# Patient Record
Sex: FEMALE | Race: WHITE | NOT HISPANIC OR LATINO | Employment: UNEMPLOYED | URBAN - METROPOLITAN AREA
[De-identification: names, ages, dates, MRNs, and addresses within clinical notes are randomized per-mention and may not be internally consistent; named-entity substitution may affect disease eponyms.]

---

## 2017-01-01 PROBLEM — L03.115 CELLULITIS OF LEG, RIGHT: Status: ACTIVE | Noted: 2017-01-01

## 2017-01-02 PROBLEM — I50.31 ACUTE DIASTOLIC CONGESTIVE HEART FAILURE: Status: ACTIVE | Noted: 2017-01-02

## 2017-01-02 PROBLEM — E87.6 HYPOKALEMIA: Status: ACTIVE | Noted: 2017-01-02

## 2017-01-06 ENCOUNTER — PATIENT OUTREACH (OUTPATIENT)
Dept: ADMINISTRATIVE | Facility: CLINIC | Age: 82
End: 2017-01-06
Payer: MEDICARE

## 2017-01-06 RX ORDER — ACETAMINOPHEN 325 MG/1
325 TABLET ORAL DAILY
COMMUNITY

## 2017-01-06 NOTE — PROGRESS NOTES
C3 nurse contacted [facility] regarding HH aid and pt is not approved by [facility] for an aid; Authorization may take a week or more to be approved. I requested she call pt to give the son information; HH agreed to call pt.

## 2017-01-06 NOTE — PATIENT INSTRUCTIONS
"Discharge Instructions for Heart Failure  You have been diagnosed with heart failure. The term "heart failure" sounds scary as it suggests the heart has stopped working. But it actually means the heart is not doing its job as well as it should. Heart failure happens when your heart muscle cannot keep up with your body's need for blood flow. Symptoms of heart failure can be controlled by changes in your lifestyle and by following your doctor's advice.   Home Care  Activity   Ask your doctor about an exercise program. You can benefit from simple activities such as walking or gardening. Exercising most days of the week can make you feel better. Don't be discouraged if your progress is slow at first. Rest as needed and stop activity if you develop symptoms such as chest pain, lightheadedness, or significant shortness of breath. Your doctor may prescribe a cardiac rehabilitation program. This is a program to help recover from heart disease through professional lifestyle counseling and education and medically supervised physical activity.   Diet   Follow a heart healthy diet and work hard to remove salt from your diet. Try to limit total salt intake to 2000 mg a day or less. Salt causes your body to retain water, which can make it harder for your heart to pump. You can limit salt by doing the following:  Limit canned, dried, packaged, and fast foods.  Don't add salt to your food at the table.  Season foods with herbs instead of salt when you cook.  Monitor your fluid intake. Drinking too much fluid can make heart failure worse. It is commonly advised to limit total fluid intake to less than 66 ounces (2 liters) a day.  Limit alcohol. Too much alcohol can be harmful to the heart. Alcohol should be limited to no more than one serving a day for women and two servings a day for men.  Tobacco   Break the smoking habit. Smoking increases your chance of having a heart attack, which will worsen heart failure. Quitting smoking is " the number one thing you can do to improve your health. Enroll in a stop smoking program and ask your doctor about medications or nicotine replacement therapy. These methods improve your chances of success.  Medications   Take your medications exactly as prescribed. Learn the names and purpose of each of your medications. Keep an accurate medication list with you at all times including current dosages. Don't skip doses. If you miss a dose of your medication, take it as soon as you remember, unless it's almost time for your next dose. In that case, just wait and take your next dose at the normal time. Don't take a double dose. If you are unsure, contact your doctor or pharmacist.  Weight monitoring   Weigh yourself every day. Do this at the same time of day and in the same kind of clothes. Ideally, weigh yourself first thing every morning after you empty your bladder, but before you eat breakfast. Record your weight and take a record of it to each of your doctor's visit. If your weight increases by 3 pounds in one day or 5 pounds in one week, you should contact your doctor. This is a sign that you are retaining more fluid than you should be, which can worsen heart failure.  Symptoms   Heart failure can cause a variety of symptoms including the following:  Shortness of breath  Difficulty breathing at night  Swelling in the legs and feet or in the abdomen  Becoming easily fatigued  Irregular or rapid heartbeat  Weakness or lightheadedness  It is important to know what to do if you develop signs of worsening heart failure.  Follow-Up  Make a follow-up appointment as directed by our staff. They will provide specific instruction for timing of appointments. Depending on the type and severity of heart failure you have, you may require follow up as early as 7 days from hospital discharge. Keep appointments for checkups and lab tests that are needed to check your medications and condition.  .    When to Call Your Doctor  Call  your doctor right away if you have any of the following signs of worsening heart failure:  Sudden weight gain (3 or more pounds in one day or 5 or more pounds in one week)  Trouble breathing not related to being active  New or increased swelling of your legs or ankles  Swelling or pain in your abdomen  Breathing trouble at night (waking up short of breath, needing more pillows to breathe)  Frequent coughing that doesnt go away  Feeling much more tired than usual  When to Seek Emergency Medical Attention   Call 911 right away if you develop:  Severe shortness of breath, such that you cannot catch your breath, even resting  Severe chest pain that does not resolve with rest or nitroglycerin  Pink, foamy mucus with cough and shortness of breath  A continuous rapid or irregular heartbeat  Passing out or fainting  Acute stroke symptoms such as sudden numbness or weakness on one side of your face, arm, or leg, or sudden confusion, trouble speaking or vision changes.   © 7482-8278 Leda Callaway, [address] [place] [address], [place] [address]. All rights reserved. This information is not intended as a substitute for professional medical care. Always follow your healthcare professional's instructions.

## 2017-07-13 ENCOUNTER — HOSPITAL ENCOUNTER (OUTPATIENT)
Facility: HOSPITAL | Age: 82
LOS: 1 days | Discharge: HOME HEALTH | End: 2017-07-14
Attending: EMERGENCY MEDICINE | Admitting: EMERGENCY MEDICINE
Payer: MEDICARE

## 2017-07-13 DIAGNOSIS — E87.1 HYPONATREMIA: ICD-10-CM

## 2017-07-13 DIAGNOSIS — R53.83 FATIGUE: ICD-10-CM

## 2017-07-13 DIAGNOSIS — I21.4 NSTEMI (NON-ST ELEVATED MYOCARDIAL INFARCTION): Primary | ICD-10-CM

## 2017-07-13 DIAGNOSIS — R53.1 WEAKNESS: ICD-10-CM

## 2017-07-13 DIAGNOSIS — R79.89 ELEVATED TROPONIN: ICD-10-CM

## 2017-07-13 DIAGNOSIS — I50.30 (HFPEF) HEART FAILURE WITH PRESERVED EJECTION FRACTION: ICD-10-CM

## 2017-07-13 PROBLEM — R33.9 URINARY RETENTION: Status: ACTIVE | Noted: 2017-07-13

## 2017-07-13 PROBLEM — I50.32 CHRONIC DIASTOLIC CHF (CONGESTIVE HEART FAILURE): Status: ACTIVE | Noted: 2017-07-13

## 2017-07-13 LAB
ALBUMIN SERPL BCP-MCNC: 3.2 G/DL
ALP SERPL-CCNC: 53 U/L
ALT SERPL W/O P-5'-P-CCNC: 34 U/L
AMORPH CRY URNS QL MICRO: NORMAL
ANION GAP SERPL CALC-SCNC: 10 MMOL/L
AST SERPL-CCNC: 51 U/L
BACTERIA #/AREA URNS HPF: NORMAL /HPF
BASOPHILS # BLD AUTO: 0.01 K/UL
BASOPHILS NFR BLD: 0.1 %
BILIRUB SERPL-MCNC: 2.4 MG/DL
BILIRUB UR QL STRIP: NEGATIVE
BUN SERPL-MCNC: 21 MG/DL
CALCIUM SERPL-MCNC: 9 MG/DL
CHLORIDE SERPL-SCNC: 96 MMOL/L
CLARITY UR: CLEAR
CO2 SERPL-SCNC: 22 MMOL/L
COLOR UR: ABNORMAL
CREAT SERPL-MCNC: 0.9 MG/DL
DIFFERENTIAL METHOD: ABNORMAL
EOSINOPHIL # BLD AUTO: 0 K/UL
EOSINOPHIL NFR BLD: 0 %
ERYTHROCYTE [DISTWIDTH] IN BLOOD BY AUTOMATED COUNT: 13.1 %
EST. GFR  (AFRICAN AMERICAN): >60 ML/MIN/1.73 M^2
EST. GFR  (NON AFRICAN AMERICAN): 56 ML/MIN/1.73 M^2
GLUCOSE SERPL-MCNC: 105 MG/DL
GLUCOSE UR QL STRIP: NEGATIVE
HCT VFR BLD AUTO: 43.5 %
HGB BLD-MCNC: 15.3 G/DL
HGB UR QL STRIP: NEGATIVE
HYALINE CASTS #/AREA URNS LPF: 1 /LPF
KETONES UR QL STRIP: NEGATIVE
LEUKOCYTE ESTERASE UR QL STRIP: NEGATIVE
LIPASE SERPL-CCNC: 44 U/L
LYMPHOCYTES # BLD AUTO: 1.6 K/UL
LYMPHOCYTES NFR BLD: 16.1 %
MCH RBC QN AUTO: 33.9 PG
MCHC RBC AUTO-ENTMCNC: 35.2 %
MCV RBC AUTO: 97 FL
MICROSCOPIC COMMENT: NORMAL
MONOCYTES # BLD AUTO: 1.6 K/UL
MONOCYTES NFR BLD: 15.6 %
NEUTROPHILS # BLD AUTO: 6.9 K/UL
NEUTROPHILS NFR BLD: 68.2 %
NITRITE UR QL STRIP: NEGATIVE
PH UR STRIP: 5 [PH] (ref 5–8)
PLATELET # BLD AUTO: 156 K/UL
PMV BLD AUTO: 9.6 FL
POTASSIUM SERPL-SCNC: 5 MMOL/L
PROT SERPL-MCNC: 7.7 G/DL
PROT UR QL STRIP: ABNORMAL
RBC # BLD AUTO: 4.51 M/UL
RBC #/AREA URNS HPF: 2 /HPF (ref 0–4)
SODIUM SERPL-SCNC: 128 MMOL/L
SP GR UR STRIP: 1.01 (ref 1–1.03)
TROPONIN I SERPL DL<=0.01 NG/ML-MCNC: 0.23 NG/ML
TROPONIN I SERPL DL<=0.01 NG/ML-MCNC: 0.23 NG/ML
URN SPEC COLLECT METH UR: ABNORMAL
UROBILINOGEN UR STRIP-ACNC: NEGATIVE EU/DL
WBC # BLD AUTO: 10.06 K/UL
WBC #/AREA URNS HPF: 3 /HPF (ref 0–5)

## 2017-07-13 PROCEDURE — 51701 INSERT BLADDER CATHETER: CPT

## 2017-07-13 PROCEDURE — 25000003 PHARM REV CODE 250: Performed by: EMERGENCY MEDICINE

## 2017-07-13 PROCEDURE — 93306 TTE W/DOPPLER COMPLETE: CPT

## 2017-07-13 PROCEDURE — G0378 HOSPITAL OBSERVATION PER HR: HCPCS

## 2017-07-13 PROCEDURE — 93005 ELECTROCARDIOGRAM TRACING: CPT | Mod: 59

## 2017-07-13 PROCEDURE — 96374 THER/PROPH/DIAG INJ IV PUSH: CPT

## 2017-07-13 PROCEDURE — 80053 COMPREHEN METABOLIC PANEL: CPT

## 2017-07-13 PROCEDURE — 83690 ASSAY OF LIPASE: CPT

## 2017-07-13 PROCEDURE — 84484 ASSAY OF TROPONIN QUANT: CPT

## 2017-07-13 PROCEDURE — 63600175 PHARM REV CODE 636 W HCPCS: Performed by: EMERGENCY MEDICINE

## 2017-07-13 PROCEDURE — 87086 URINE CULTURE/COLONY COUNT: CPT

## 2017-07-13 PROCEDURE — 85025 COMPLETE CBC W/AUTO DIFF WBC: CPT

## 2017-07-13 PROCEDURE — 81000 URINALYSIS NONAUTO W/SCOPE: CPT

## 2017-07-13 PROCEDURE — 96361 HYDRATE IV INFUSION ADD-ON: CPT

## 2017-07-13 PROCEDURE — 99291 CRITICAL CARE FIRST HOUR: CPT | Mod: 25

## 2017-07-13 PROCEDURE — 36415 COLL VENOUS BLD VENIPUNCTURE: CPT

## 2017-07-13 PROCEDURE — 51798 US URINE CAPACITY MEASURE: CPT

## 2017-07-13 PROCEDURE — 96372 THER/PROPH/DIAG INJ SC/IM: CPT

## 2017-07-13 PROCEDURE — 21400001 HC TELEMETRY ROOM

## 2017-07-13 RX ORDER — ROSUVASTATIN CALCIUM 10 MG/1
40 TABLET, COATED ORAL DAILY
Status: DISCONTINUED | OUTPATIENT
Start: 2017-07-13 | End: 2017-07-14 | Stop reason: HOSPADM

## 2017-07-13 RX ORDER — ONDANSETRON 2 MG/ML
4 INJECTION INTRAMUSCULAR; INTRAVENOUS EVERY 8 HOURS PRN
Status: DISCONTINUED | OUTPATIENT
Start: 2017-07-13 | End: 2017-07-14 | Stop reason: HOSPADM

## 2017-07-13 RX ORDER — ENOXAPARIN SODIUM 100 MG/ML
1 INJECTION SUBCUTANEOUS
Status: COMPLETED | OUTPATIENT
Start: 2017-07-13 | End: 2017-07-13

## 2017-07-13 RX ORDER — SODIUM CHLORIDE 0.9 % (FLUSH) 0.9 %
3 SYRINGE (ML) INJECTION EVERY 8 HOURS PRN
Status: DISCONTINUED | OUTPATIENT
Start: 2017-07-13 | End: 2017-07-14 | Stop reason: HOSPADM

## 2017-07-13 RX ORDER — METOPROLOL TARTRATE 1 MG/ML
5 INJECTION, SOLUTION INTRAVENOUS
Status: COMPLETED | OUTPATIENT
Start: 2017-07-13 | End: 2017-07-13

## 2017-07-13 RX ORDER — DABIGATRAN ETEXILATE 75 MG/1
75 CAPSULE ORAL 2 TIMES DAILY
Status: DISCONTINUED | OUTPATIENT
Start: 2017-07-13 | End: 2017-07-14 | Stop reason: HOSPADM

## 2017-07-13 RX ORDER — LISINOPRIL 5 MG/1
10 TABLET ORAL DAILY
Status: DISCONTINUED | OUTPATIENT
Start: 2017-07-13 | End: 2017-07-14 | Stop reason: HOSPADM

## 2017-07-13 RX ORDER — METOPROLOL TARTRATE 50 MG/1
50 TABLET ORAL 2 TIMES DAILY
Status: DISCONTINUED | OUTPATIENT
Start: 2017-07-13 | End: 2017-07-14 | Stop reason: HOSPADM

## 2017-07-13 RX ORDER — ENOXAPARIN SODIUM 100 MG/ML
1 INJECTION SUBCUTANEOUS
Status: DISCONTINUED | OUTPATIENT
Start: 2017-07-14 | End: 2017-07-13

## 2017-07-13 RX ADMIN — SODIUM CHLORIDE 500 ML: 0.9 INJECTION, SOLUTION INTRAVENOUS at 10:07

## 2017-07-13 RX ADMIN — LISINOPRIL 10 MG: 5 TABLET ORAL at 05:07

## 2017-07-13 RX ADMIN — DABIGATRAN ETEXILATE MESYLATE 75 MG: 75 CAPSULE ORAL at 09:07

## 2017-07-13 RX ADMIN — ROSUVASTATIN CALCIUM 40 MG: 10 TABLET ORAL at 05:07

## 2017-07-13 RX ADMIN — METOPROLOL TARTRATE 5 MG: 5 INJECTION INTRAVENOUS at 10:07

## 2017-07-13 RX ADMIN — METOPROLOL TARTRATE 50 MG: 50 TABLET ORAL at 09:07

## 2017-07-13 RX ADMIN — ENOXAPARIN SODIUM 60 MG: 100 INJECTION SUBCUTANEOUS at 10:07

## 2017-07-13 NOTE — ASSESSMENT & PLAN NOTE
She denies chest pain,EKG show Afib with non specific T wave changes will monitor with serial cardiac marks,check Echo.

## 2017-07-13 NOTE — PROGRESS NOTES
Awake and alert no sob no pains. Skin intact no edema no . Oriented by 3 call light in reach assessment completed.

## 2017-07-13 NOTE — SUBJECTIVE & OBJECTIVE
"Past Medical History:   Diagnosis Date    Coronary artery disease     Depression     ON WELLBUTRIN    Hypertension        History reviewed. No pertinent surgical history.    Review of patient's allergies indicates:   Allergen Reactions    Aspirin     Xanax [alprazolam]        No current facility-administered medications on file prior to encounter.      Current Outpatient Prescriptions on File Prior to Encounter   Medication Sig    alendronate (FOSAMAX) 10 MG Tab Take 5 mg by mouth once daily.    busPIRone (BUSPAR) 10 MG tablet Take 10 mg by mouth 2 (two) times daily.     dabigatran etexilate (PRADAXA) 75 mg Cap Take 75 mg by mouth 2 (two) times daily. "Do NOT break, chew, or open capsules."    furosemide (LASIX) 20 MG tablet Take 1 tablet (20 mg total) by mouth 2 (two) times daily.    lisinopril 10 MG tablet Take 1 tablet (10 mg total) by mouth once daily.    metoprolol tartrate (LOPRESSOR) 50 MG tablet Take 50 mg by mouth 2 (two) times daily.    mirtazapine (REMERON) 15 MG tablet Take 15 mg by mouth every evening.    MV, MIN #36/IRON,CARBONYL/FA (GERITOL COMPLETE ORAL) Take by mouth once daily.     spironolactone (ALDACTONE) 25 MG tablet Take 0.5 tablets (12.5 mg total) by mouth once daily.    acetaminophen (TYLENOL) 325 MG tablet Take 325 mg by mouth Daily.    [DISCONTINUED] pantoprazole (PROTONIX) 40 MG tablet Take 40 mg by mouth once daily.    [DISCONTINUED] ranitidine (ZANTAC) 300 MG tablet Take 300 mg by mouth every evening.     Family History     None        Social History Main Topics    Smoking status: Former Smoker    Smokeless tobacco: Not on file    Alcohol use No    Drug use: No    Sexual activity: Not Currently     Review of Systems   Constitutional: Negative for activity change and appetite change.   HENT: Negative for congestion and dental problem.    Eyes: Negative for discharge and itching.   Respiratory: Negative for apnea and chest tightness.    Cardiovascular: Negative for " chest pain and leg swelling.   Gastrointestinal: Negative for abdominal distention and abdominal pain.   Endocrine: Negative for cold intolerance and heat intolerance.   Genitourinary: Positive for decreased urine volume and difficulty urinating. Negative for dyspareunia.   Musculoskeletal: Negative for arthralgias.   Skin: Negative for color change and pallor.   Allergic/Immunologic: Negative for environmental allergies.   Neurological: Negative for dizziness and facial asymmetry.   Hematological: Negative for adenopathy.   Psychiatric/Behavioral: Negative for agitation and behavioral problems.     Objective:     Vital Signs (Most Recent):  Temp: 97.6 °F (36.4 °C) (07/13/17 1309)  Pulse: 106 (07/13/17 1309)  Resp: 18 (07/13/17 1309)  BP: 123/61 (07/13/17 1309)  SpO2: 96 % (07/13/17 1309) Vital Signs (24h Range):  Temp:  [97.6 °F (36.4 °C)-98.2 °F (36.8 °C)] 97.6 °F (36.4 °C)  Pulse:  [100-110] 106  Resp:  [16-20] 18  SpO2:  [89 %-96 %] 96 %  BP: (107-136)/(60-67) 123/61     Weight: 59.9 kg (132 lb)  Body mass index is 25.78 kg/m².    Physical Exam   Constitutional: She is oriented to person, place, and time. No distress.   HENT:   Head: Atraumatic.   Eyes: EOM are normal.   Neck: Normal range of motion. Neck supple.   Cardiovascular: Normal rate.    irreguar   Pulmonary/Chest: Effort normal and breath sounds normal.   Abdominal: Soft. Bowel sounds are normal.   Musculoskeletal: Normal range of motion.   Neurological: She is oriented to person, place, and time. No cranial nerve deficit. Coordination normal.   Skin: Skin is warm and dry. She is not diaphoretic.   Psychiatric: She has a normal mood and affect. Her behavior is normal.        Significant Labs:   BMP:   Recent Labs  Lab 07/13/17 0905      *   K 5.0   CL 96   CO2 22*   BUN 21   CREATININE 0.9   CALCIUM 9.0     CBC:   Recent Labs  Lab 07/13/17 0905   WBC 10.06   HGB 15.3   HCT 43.5        Troponin:   Recent Labs  Lab 07/13/17 0905    TROPONINI 0.225*       Significant Imaging: reviewed

## 2017-07-13 NOTE — H&P
"[facility] Hospital Medicine  History & Physical    Patient Name: Rachael Almaguer  MRN: [id number]  Admission Date: 7/13/2017  Attending Physician: Lisa Ramos MD   Primary Care Provider: Omi Thomas MD         Patient information was obtained from patient and ER records.     Subjective:     Principal Problem:Elevated troponin    Chief Complaint:   Chief Complaint   Patient presents with    Fatigue     since last pm been weak with complaints of unable to urinate since last pm.. denies pains.        HPI: This 89 y.o. Female with HTN, CAD and depression presents to the ED secondary to fatigue. Symptoms began this morning. There's associated generalized weakness. She also reports lower bilateral abdominal pain with onset last night along with urinary retention. She reports drinking 3 bottles of water without producing a urine stream. Patient has a hx of urinary retention. There's no attempted treatment for abdominal pain. No UTI hx. She denies dysuria at this time.patient has slight elevated Troponin with no chest pain.she denies abdominal pain at this time.    Past Medical History:   Diagnosis Date    Coronary artery disease     Depression     ON WELLBUTRIN    Hypertension        History reviewed. No pertinent surgical history.    Review of patient's allergies indicates:   Allergen Reactions    Aspirin     Xanax [alprazolam]        No current facility-administered medications on file prior to encounter.      Current Outpatient Prescriptions on File Prior to Encounter   Medication Sig    alendronate (FOSAMAX) 10 MG Tab Take 5 mg by mouth once daily.    busPIRone (BUSPAR) 10 MG tablet Take 10 mg by mouth 2 (two) times daily.     dabigatran etexilate (PRADAXA) 75 mg Cap Take 75 mg by mouth 2 (two) times daily. "Do NOT break, chew, or open capsules."    furosemide (LASIX) 20 MG tablet Take 1 tablet (20 mg total) by mouth 2 (two) times daily.    lisinopril 10 MG tablet Take 1 " tablet (10 mg total) by mouth once daily.    metoprolol tartrate (LOPRESSOR) 50 MG tablet Take 50 mg by mouth 2 (two) times daily.    mirtazapine (REMERON) 15 MG tablet Take 15 mg by mouth every evening.    MV, MIN #36/IRON,CARBONYL/FA (GERITOL COMPLETE ORAL) Take by mouth once daily.     spironolactone (ALDACTONE) 25 MG tablet Take 0.5 tablets (12.5 mg total) by mouth once daily.    acetaminophen (TYLENOL) 325 MG tablet Take 325 mg by mouth Daily.    [DISCONTINUED] pantoprazole (PROTONIX) 40 MG tablet Take 40 mg by mouth once daily.    [DISCONTINUED] ranitidine (ZANTAC) 300 MG tablet Take 300 mg by mouth every evening.     Family History     None        Social History Main Topics    Smoking status: Former Smoker    Smokeless tobacco: Not on file    Alcohol use No    Drug use: No    Sexual activity: Not Currently     Review of Systems   Constitutional: Negative for activity change and appetite change.   HENT: Negative for congestion and dental problem.    Eyes: Negative for discharge and itching.   Respiratory: Negative for apnea and chest tightness.    Cardiovascular: Negative for chest pain and leg swelling.   Gastrointestinal: Negative for abdominal distention and abdominal pain.   Endocrine: Negative for cold intolerance and heat intolerance.   Genitourinary: Positive for decreased urine volume and difficulty urinating. Negative for dyspareunia.   Musculoskeletal: Negative for arthralgias.   Skin: Negative for color change and pallor.   Allergic/Immunologic: Negative for environmental allergies.   Neurological: Negative for dizziness and facial asymmetry.   Hematological: Negative for adenopathy.   Psychiatric/Behavioral: Negative for agitation and behavioral problems.     Objective:     Vital Signs (Most Recent):  Temp: 97.6 °F (36.4 °C) (07/13/17 1309)  Pulse: 106 (07/13/17 1309)  Resp: 18 (07/13/17 1309)  BP: 123/61 (07/13/17 1309)  SpO2: 96 % (07/13/17 1309) Vital Signs (24h Range):  Temp:  [97.6  °F (36.4 °C)-98.2 °F (36.8 °C)] 97.6 °F (36.4 °C)  Pulse:  [100-110] 106  Resp:  [16-20] 18  SpO2:  [89 %-96 %] 96 %  BP: (107-136)/(60-67) 123/61     Weight: 59.9 kg (132 lb)  Body mass index is 25.78 kg/m².    Physical Exam   Constitutional: She is oriented to person, place, and time. No distress.   HENT:   Head: Atraumatic.   Eyes: EOM are normal.   Neck: Normal range of motion. Neck supple.   Cardiovascular: Normal rate.    irreguar   Pulmonary/Chest: Effort normal and breath sounds normal.   Abdominal: Soft. Bowel sounds are normal.   Musculoskeletal: Normal range of motion.   Neurological: She is oriented to person, place, and time. No cranial nerve deficit. Coordination normal.   Skin: Skin is warm and dry. She is not diaphoretic.   Psychiatric: She has a normal mood and affect. Her behavior is normal.        Significant Labs:   BMP:   Recent Labs  Lab 07/13/17  0905      *   K 5.0   CL 96   CO2 22*   BUN 21   CREATININE 0.9   CALCIUM 9.0     CBC:   Recent Labs  Lab 07/13/17  0905   WBC 10.06   HGB 15.3   HCT 43.5        Troponin:   Recent Labs  Lab 07/13/17 0905   TROPONINI 0.225*       Significant Imaging: reviewed    Assessment/Plan:     * Elevated troponin    She denies chest pain,EKG show Afib with non specific T wave changes will monitor with serial cardiac marks,check Echo.          Urinary retention    Monitor with bladder scan.          Chronic diastolic CHF (congestive heart failure)    No sign of fluid overload,on ACE and BB.          Chronic atrial fibrillation    Rate controlled,on BB an d OAC          Hyponatremia    likely duo to diuresis,will monitor.            VTE Risk Mitigation         Ordered     dabigatran etexilate capsule 75 mg  2 times daily     Route:  Oral        07/13/17 1305     Medium Risk of VTE  Once      07/13/17 1305     Place sequential compression device  Until discontinued      07/13/17 1305     Place MARYANNE hose  Until discontinued      07/13/17 1305         Lisa Ramos MD  Department of Hospital Medicine   [facility]

## 2017-07-13 NOTE — HPI
This 89 y.o. Female with HTN, CAD and depression presents to the ED secondary to fatigue. Symptoms began this morning. There's associated generalized weakness. She also reports lower bilateral abdominal pain with onset last night along with urinary retention. She reports drinking 3 bottles of water without producing a urine stream. Patient has a hx of urinary retention. There's no attempted treatment for abdominal pain. No UTI hx. She denies dysuria at this time.patient has slight elevated Troponin with no chest pain.she denies abdominal pain at this time.

## 2017-07-13 NOTE — ED PROVIDER NOTES
Encounter Date: 7/13/2017    SCRIBE #1 NOTE: I, Darci Charles, am scribing for, and in the presence of,  Earl Barbosa MD. I have scribed the following portions of the note - Other sections scribed: HPI and ROS.       History     Chief Complaint   Patient presents with    Fatigue     since last pm been weak with complaints of unable to urinate since last pm.. denies pains.     Chief Complaint: Fatigue    HPI: This 89 y.o. Female with HTN, CAD and depression presents to the ED secondary to fatigue. Symptoms began this morning. There's associated generalized weakness. She also reports lower bilateral abdominal pain with onset last night along with urinary retention. She reports drinking 3 bottles of water without producing a urine stream. Patient has a hx of urinary retention. There's no attempted treatment for abdominal pain. No UTI hx. She denies dysuria at this time.       The history is provided by the patient and a relative. No  was used.     Review of patient's allergies indicates:   Allergen Reactions    Aspirin      Past Medical History:   Diagnosis Date    Coronary artery disease     Depression     ON WELLBUTRIN    Hypertension      History reviewed. No pertinent surgical history.  History reviewed. No pertinent family history.  Social History   Substance Use Topics    Smoking status: Former Smoker    Smokeless tobacco: Not on file    Alcohol use No     Review of Systems   Constitutional: Positive for fatigue. Negative for chills and fever.   HENT: Negative for congestion, ear pain, rhinorrhea and sore throat.    Eyes: Negative for pain and visual disturbance.   Respiratory: Negative for cough and shortness of breath.    Cardiovascular: Negative for chest pain.   Gastrointestinal: Positive for abdominal pain. Negative for diarrhea, nausea and vomiting.   Genitourinary: Positive for decreased urine volume and difficulty urinating. Negative for dysuria.   Musculoskeletal:  Negative for back pain.        (-) arm or leg problems   Skin: Negative for rash.   Neurological: Positive for weakness. Negative for headaches.   All other systems reviewed and are negative.      Physical Exam     Initial Vitals [07/13/17 0727]   BP Pulse Resp Temp SpO2   136/64 110 18 97.9 °F (36.6 °C) 95 %      MAP       88         Physical Exam  Nursing note and vitals reviewed.  Constitutional:  Uncomfortable nontoxic appearing elderly female in no obvious distress.  HENT:    Head: NC/AT    Eyes: Conjunctivae normal.  (-) scleral icterus.              Mouth/Throat: MMM   Neck: Neck supple, normal rom.  Cardiovascular: Tachycardic, irregular rhythm  Pulmonary/Chest: CTAB   Abdomen:  Soft, ND/suprapubic ttp.  (-) CVA tenderness.  Musculoskeletal:  FROM of all major joints. No apparent edema or tenderness.  Neurological: A&O x3.  No acute focal motor deficits.    Skin: Skin is intact, warm and dry.   Psychiatric: normal mood and affect.      ED Course   Critical Care  Date/Time: 7/13/2017 11:47 AM  Performed by: CHADWICK RICE.  Authorized by: CHADWICK RICE.   Direct patient critical care time: 10 minutes  Additional history critical care time: 10 minutes  Ordering / reviewing critical care time: 10 minutes  Documentation critical care time: 5 minutes  Consulting other physicians critical care time: 5 minutes  Total critical care time (exclusive of procedural time) : 40 minutes  Critical care time was exclusive of separately billable procedures and treating other patients and teaching time.  Critical care was necessary to treat or prevent imminent or life-threatening deterioration of the following conditions: cardiac failure.  Critical care was time spent personally by me on the following activities: development of treatment plan with patient or surrogate, evaluation of patient's response to treatment, examination of patient, obtaining history from patient or surrogate, ordering and performing treatments  and interventions, ordering and review of laboratory studies, ordering and review of radiographic studies, pulse oximetry, re-evaluation of patient's condition and review of old charts.        Labs Reviewed   CBC W/ AUTO DIFFERENTIAL - Abnormal; Notable for the following:        Result Value    MCH 33.9 (*)     Mono # 1.6 (*)     Lymph% 16.1 (*)     Mono% 15.6 (*)     All other components within normal limits   COMPREHENSIVE METABOLIC PANEL - Abnormal; Notable for the following:     Sodium 128 (*)     CO2 22 (*)     Albumin 3.2 (*)     Total Bilirubin 2.4 (*)     Alkaline Phosphatase 53 (*)     AST 51 (*)     eGFR if non  56 (*)     All other components within normal limits   TROPONIN I - Abnormal; Notable for the following:     Troponin I 0.225 (*)     All other components within normal limits   URINALYSIS - Abnormal; Notable for the following:     Protein, UA 1+ (*)     All other components within normal limits   CULTURE, URINE   LIPASE   URINALYSIS MICROSCOPIC     EKG Readings: (Independently Interpreted)   Initial Reading: No STEMI.   A. fib with RVR, rate 113, nonspecific ST/T-wave abnormality.          Medical Decision Making:   History:   I obtained history from: someone other than patient.  Old Medical Records: I decided to obtain old medical records.  Old Records Summarized: records from clinic visits and other records.  Independently Interpreted Test(s):   I have ordered and independently interpreted X-rays - see prior notes.  I have ordered and independently interpreted EKG Reading(s) - see prior notes  Clinical Tests:   Lab Tests: Ordered and Reviewed  Radiological Study: Ordered and Reviewed  Medical Tests: Ordered and Reviewed    Differential Diagnosis:   Initial differential diagnosis includes but is not limited to...appendicitis, nephrolithiasis, pyelonephritis, cystitis, food borne vs viral gastroenteritis, colitis, ileus, sbo, mesenteric ischemia.     Additional Medical Decision  Making:   Emergent evaluation of a 89-year-old female with history of hypertension, CAD, and CHF who presents the emergency department complaining of lower pain with urinary hesitancy since late last night.  On exam, she has suprapubic tenderness to deep palpation without guarding or rebound.  She has no CVAT to suggest pyonephritis.  VS reassuring.  Afebrile.  Basic labs along with UA and postvoid residual pending. - Basic labs within normal limits.  Repeat EKG again without evidence of acute ischemia.  Her troponin however was found to be elevated.   Lovenox SC given for suspected NSTEMI and she has been admitted to medicine for further evaluation and management.  Cardiology to be consulted inpatient.          Scribe Attestation:   Scribe #1: I performed the above scribed service and the documentation accurately describes the services I performed. I attest to the accuracy of the note.    Attending Attestation:           Physician Attestation for Scribe:  Physician Attestation Statement for Scribe #1: I, Earl Barbosa MD, reviewed documentation, as scribed by Darci Charles in my presence, and it is both accurate and complete.                 ED Course     Clinical Impression:   The primary encounter diagnosis was NSTEMI (non-ST elevated myocardial infarction). Diagnoses of Fatigue, Weakness, Hyponatremia, and (HFpEF) heart failure with preserved ejection fraction were also pertinent to this visit.                           Earl Barbosa MD  07/13/17 [id number]

## 2017-07-14 VITALS
WEIGHT: 136 LBS | OXYGEN SATURATION: 94 % | SYSTOLIC BLOOD PRESSURE: 127 MMHG | HEART RATE: 101 BPM | DIASTOLIC BLOOD PRESSURE: 59 MMHG | BODY MASS INDEX: 26.7 KG/M2 | TEMPERATURE: 97 F | HEIGHT: 60 IN | RESPIRATION RATE: 18 BRPM

## 2017-07-14 LAB
ALBUMIN SERPL BCP-MCNC: 2.9 G/DL
ALP SERPL-CCNC: 52 U/L
ALT SERPL W/O P-5'-P-CCNC: 33 U/L
ANION GAP SERPL CALC-SCNC: 9 MMOL/L
AORTIC VALVE REGURGITATION: ABNORMAL
AST SERPL-CCNC: 52 U/L
BASOPHILS # BLD AUTO: 0.02 K/UL
BASOPHILS NFR BLD: 0.2 %
BILIRUB SERPL-MCNC: 2.1 MG/DL
BUN SERPL-MCNC: 24 MG/DL
CALCIUM SERPL-MCNC: 8.9 MG/DL
CHLORIDE SERPL-SCNC: 98 MMOL/L
CO2 SERPL-SCNC: 23 MMOL/L
CREAT SERPL-MCNC: 1 MG/DL
DIASTOLIC DYSFUNCTION: YES
DIFFERENTIAL METHOD: ABNORMAL
EOSINOPHIL # BLD AUTO: 0 K/UL
EOSINOPHIL NFR BLD: 0.2 %
ERYTHROCYTE [DISTWIDTH] IN BLOOD BY AUTOMATED COUNT: 13.5 %
EST. GFR  (AFRICAN AMERICAN): 57 ML/MIN/1.73 M^2
EST. GFR  (NON AFRICAN AMERICAN): 49 ML/MIN/1.73 M^2
ESTIMATED PA SYSTOLIC PRESSURE: 55.06
GLUCOSE SERPL-MCNC: 74 MG/DL
HCT VFR BLD AUTO: 41 %
HGB BLD-MCNC: 14.1 G/DL
INR PPP: 1.3
LYMPHOCYTES # BLD AUTO: 1.7 K/UL
LYMPHOCYTES NFR BLD: 19.5 %
MAGNESIUM SERPL-MCNC: 1.5 MG/DL
MCH RBC QN AUTO: 33.9 PG
MCHC RBC AUTO-ENTMCNC: 34.4 %
MCV RBC AUTO: 99 FL
MITRAL VALVE MOBILITY: ABNORMAL
MITRAL VALVE REGURGITATION: ABNORMAL
MONOCYTES # BLD AUTO: 1.2 K/UL
MONOCYTES NFR BLD: 14.2 %
NEUTROPHILS # BLD AUTO: 5.6 K/UL
NEUTROPHILS NFR BLD: 65.9 %
PHOSPHATE SERPL-MCNC: 2.9 MG/DL
PLATELET # BLD AUTO: 183 K/UL
PMV BLD AUTO: 9.7 FL
POTASSIUM SERPL-SCNC: 5 MMOL/L
PROT SERPL-MCNC: 6.9 G/DL
PROTHROMBIN TIME: 13.4 SEC
RBC # BLD AUTO: 4.16 M/UL
RETIRED EF AND QEF - SEE NOTES: 65 (ref 55–65)
SODIUM SERPL-SCNC: 130 MMOL/L
TRICUSPID VALVE REGURGITATION: ABNORMAL
TROPONIN I SERPL DL<=0.01 NG/ML-MCNC: 0.2 NG/ML
WBC # BLD AUTO: 8.53 K/UL

## 2017-07-14 PROCEDURE — G8978 MOBILITY CURRENT STATUS: HCPCS | Mod: CI

## 2017-07-14 PROCEDURE — 85610 PROTHROMBIN TIME: CPT

## 2017-07-14 PROCEDURE — 94761 N-INVAS EAR/PLS OXIMETRY MLT: CPT

## 2017-07-14 PROCEDURE — 80053 COMPREHEN METABOLIC PANEL: CPT

## 2017-07-14 PROCEDURE — 97161 PT EVAL LOW COMPLEX 20 MIN: CPT

## 2017-07-14 PROCEDURE — G0378 HOSPITAL OBSERVATION PER HR: HCPCS

## 2017-07-14 PROCEDURE — G8979 MOBILITY GOAL STATUS: HCPCS | Mod: CI

## 2017-07-14 PROCEDURE — 51798 US URINE CAPACITY MEASURE: CPT

## 2017-07-14 PROCEDURE — 83735 ASSAY OF MAGNESIUM: CPT

## 2017-07-14 PROCEDURE — G8980 MOBILITY D/C STATUS: HCPCS | Mod: CI

## 2017-07-14 PROCEDURE — 25000003 PHARM REV CODE 250: Performed by: EMERGENCY MEDICINE

## 2017-07-14 PROCEDURE — 84100 ASSAY OF PHOSPHORUS: CPT

## 2017-07-14 PROCEDURE — 36415 COLL VENOUS BLD VENIPUNCTURE: CPT

## 2017-07-14 PROCEDURE — 85025 COMPLETE CBC W/AUTO DIFF WBC: CPT

## 2017-07-14 RX ADMIN — METOPROLOL TARTRATE 50 MG: 50 TABLET ORAL at 09:07

## 2017-07-14 RX ADMIN — DABIGATRAN ETEXILATE MESYLATE 75 MG: 75 CAPSULE ORAL at 09:07

## 2017-07-14 RX ADMIN — ROSUVASTATIN CALCIUM 40 MG: 10 TABLET ORAL at 09:07

## 2017-07-14 RX ADMIN — LISINOPRIL 10 MG: 5 TABLET ORAL at 09:07

## 2017-07-14 NOTE — PLAN OF CARE
Problem: Physical Therapy Goal  Goal: Physical Therapy Goal  Outcome: Outcome(s) achieved Date Met: 07/14/17    Patient ambulated ~200ft with RW and CGA. She would benefit from HH PT at discharge.

## 2017-07-14 NOTE — PT/OT/SLP PROGRESS
Occupational Therapy      Rachael Almaguer  MRN: [id number]    Orders received, chart reviewed.  Per PT notes patient ambulating at her PLOF, spoke with family in room who stated that patient with no OT needs at this time.  Patient to be discharged home.     SINAN Snow, MS  7/14/2017

## 2017-07-14 NOTE — PROGRESS NOTES
Patient discharged to home today with family. Patient is [place] but she stated to give instructions to daughter and daughter in-law. Both educated on the importance of keeping all appointments and  medication compliance .Both verbalized understanding of all instructions.

## 2017-07-14 NOTE — PT/OT/SLP EVAL
Physical Therapy  Evaluation / Discharge    Rachael Almaguer   MRN: [id number]   Admitting Diagnosis: Elevated troponin    PT Received On: 07/14/17  PT Start Time: 1100     PT Stop Time: 1110    PT Total Time (min): 10 min       Billable Minutes:  Evaluation  10     Diagnosis: Elevated troponin    Past Medical History:   Diagnosis Date    Coronary artery disease     Depression     ON [place]    Hypertension       History reviewed. No pertinent surgical history.    Referring physician: Richard  Date referred to PT: 7/13/17    General Precautions: Standard, fall  Orthopedic Precautions: N/A   Braces: N/A       Patient History:  Lives With: alone  Living Arrangements: house  Home Accessibility: stairs to enter home  Number of Stairs to Enter Home: 1  Living Environment Comment: Patient's family wants her to go move in with them but she is declining.   Equipment Currently Used at Home: rollator    Previous Level of Function:  Ambulation Skills: needs device  Transfer Skills: needs device    Subjective:  Communicated with nurse Pisano prior to session.  Patient agreeable to participate in PT evaluation.   Chief Complaint: Her family wants her to move in with them but she wants to stay in her own home.   Patient goals: To go home.     Pain/Comfort  Pain Rating 1: 0/10    Objective:   Patient found with: telemetry, peripheral IV     Cognitive Exam:  Oriented to: Person, Place and Situation    Follows Commands/attention: Follows one-step commands  Communication: clear/fluent  Safety awareness/insight to disability: impaired    Physical Exam:  Postural examination/scapula alignment: Rounded shoulder, Head forward and Kyphosis    Skin integrity: Visible skin intact  Edema: None noted     Sensation:   Intact    Upper Extremity Range of Motion:  Right Upper Extremity: WFL  Left Upper Extremity: WFL    Upper Extremity Strength:  Right Upper Extremity: WFL  Left Upper Extremity: WFL    Lower Extremity Range of  Motion:  Right Lower Extremity: WFL  Left Lower Extremity: WFL    Lower Extremity Strength:  Right Lower Extremity: WFL  Left Lower Extremity: WFL     Fine motor coordination:Intact    Gross motor coordination: WFL    Functional Mobility:  Bed Mobility:  Supine to Sit: Supervision  Sit to Supine: Supervision    Transfers:  Sit <> Stand Assistance: Supervision  Sit <> Stand Assistive Device: Rolling Walker    Gait:   Gait Distance: ~200ft   Assistance 1: Contact Guard Assistance  Gait Assistive Device: Rolling walker  Gait Pattern: reciprocal    Balance:   Static Sit: GOOD: Takes MODERATE challenges from all directions  Dynamic Sit: GOOD-: Maintains balance through MODERATE excursions of active trunk movement,     Static Stand: FAIR+: Takes MINIMAL challenges from all directions  Dynamic stand: FAIR: Needs CONTACT GUARD during gait    AM-PAC 6 CLICK MOBILITY  How much help from another person does this patient currently need?   1 = Unable, Total/Dependent Assistance  2 = A lot, Maximum/Moderate Assistance  3 = A little, Minimum/Contact Guard/Supervision  4 = None, Modified Camp/Independent    Turning over in bed (including adjusting bedclothes, sheets and blankets)?: 4  Sitting down on and standing up from a chair with arms (e.g., wheelchair, bedside commode, etc.): 4  Moving from lying on back to sitting on the side of the bed?: 4  Moving to and from a bed to a chair (including a wheelchair)?: 4  Need to walk in hospital room?: 4  Climbing 3-5 steps with a railing?: 3  Total Score: 23     AM-PAC Raw Score CMS G-Code Modifier Level of Impairment Assistance   6 % Total / Unable   7 - 9 CM 80 - 100% Maximal Assist   10 - 14 CL 60 - 80% Moderate Assist   15 - 19 CK 40 - 60% Moderate Assist   20 - 22 CJ 20 - 40% Minimal Assist   23 CI 1-20% SBA / CGA   24 CH 0% Independent/ Mod I     Patient left supine with all lines intact, call button in reach, nurse notified and multiple family members present  present. Dr Marcelino notified of PT recommendations.     Assessment:   Rachael Almaguer is a 89 y.o. female with a medical diagnosis of Elevated troponin. She appears to be at PLOF with ambulation. She has no needs for PT while in the hospital. She is okay to ambulate with nursing staff supervision and rolling walker. PT to sign off.     Rehab identified problem list/impairments: Rehab identified problem list/impairments: decreased safety awareness    Rehab potential is good.    Activity tolerance: Good    Discharge recommendations: Discharge Facility/Level Of Care Needs: home health PT     Barriers to discharge: Barriers to Discharge: Decreased caregiver support (patient lives alone and is declining to move in with family members)    Equipment recommendations: Equipment Needed After Discharge: none     GOALS:    Physical Therapy Goals     Not on file          Multidisciplinary Problems (Resolved)        Problem: Physical Therapy Goal    Goal Priority Disciplines Outcome Goal Variances Interventions   Physical Therapy Goal   (Resolved)     PT/OT, PT Outcome(s) achieved                   PLAN:    Plan of Care reviewed with: patient (multiple family members)    Functional Assessment Tool Used: AM PAC   Score: 23  Functional Limitation: Mobility: Walking and moving around  Mobility: Walking and Moving Around Current Status (): CI  Mobility: Walking and Moving Around Goal Status (): CI  Mobility: Walking and Moving Around Discharge Status (): CI     Lily Walton, PT, MOT  07/14/2017

## 2017-07-14 NOTE — PLAN OF CARE
"   07/14/17 1636   Final Note   Assessment Type Final Discharge Note   Discharge Disposition Home-Health   Discharge planning education complete? Yes   What phone number can be called within the next 1-3 days to see how you are doing after discharge? ([phone])   Hospital Follow Up  Appt(s) scheduled? Yes   Discharge plans and expectations educations in teach back method with documentation complete? Yes   [facility]fered [facility] -- Bedside Delivery? n/a   Discharge/Hospital Encounter Summary to (non-[facility]) PCP n/a   Referral to Outpatient Case Management complete? n/a   Referral to / orders for Home Health Complete? n/a   30 day supply of medicines given at discharge, if documented non-compliance / non-adherence? n/a   Any social issues identified prior to discharge? n/a   Did you assess the readiness or willingness of the family or caregiver to support self management of care? Yes   Right Care Referral Info   Referral Type Home [facility]   Facility Name [facility]   Patient / Family provided with educational information on reason for admit and findings.  Information reviewed and placed in :My Healthcare Packet" to be brought home for patient / family  to use as resource after discharge.  Information included:  signs and symptoms to look for and call the doctor if experiencing, and symptoms that may indicate a medical emergency: CALL 911:  SOB unrelieved by sitting and chest pain.    Reminded patient things they will be responsible for to manage healthcare at home: getting Rx filled, attending follow up appointments, and taking medication as prescribed were discussed.   Teach back method used.  All questions answered.  Daughters verbalized understanding of all information.        "

## 2017-07-14 NOTE — PROGRESS NOTES
[facility]    WRITTEN HEALTHCARE AND DISCHARGE INFORMATION     Follow-up Information     Boris Carmichael MD On 7/19/2017.    Specialty:  Internal Medicine  Why:  Outpatient Services, PCP follow-up appointment. Patient should arrive by 9:00AM. Pt will see NP Niranjan Liu.   Contact information:  [address]  [address]  [place] [address]  [phone]             [facility].    Specialty:  Home Health Services  Why:  Home Health  Contact information:  [address]  [address]  [place] [address]  [phone]                                      [facility]           [phone]  After discharge for assistance [facility] On Call Nurse Care Line 24/7 Assistance    Things You are responsible For To Manage Your Care At Home:  1.    Getting your prescriptions filled   2.    Taking your medications as directed, DO NOT MISS ANY DOSES!  3.    Going to your follow-up doctor appointment. This is important because it  allow the doctor to monitor your progress and determine if  any changes need to made to your treatment plan.     Thank you for choosing [facility] for your care.  Please answer any calls you may receive from [facility] we want to continue to support you as you manage your healthcare needs. [facility] is happy to have the opportunity to serve you.     Sincerely,  Your [facility] Team,  Monae Rice, RN, ACM-RN; Transition Navigator [phone]      Above information printed and presented to pt's daughters who verbalized understanding

## 2017-07-14 NOTE — DISCHARGE SUMMARY
[facility] Hospital Medicine  Discharge Summary      Patient Name: Rachael Almaguer  MRN: [id number]  Admission Date: 7/13/2017  Hospital Length of Stay: 1 days  Discharge Date and Time:  07/14/2017 11:19 AM  Attending Physician: Lisa Ramos MD   Discharging Provider: Lisa Ramos MD  Primary Care Provider: Omi Thomas MD      HPI:   This 89 y.o. Female with HTN, CAD and depression presents to the ED secondary to fatigue. Symptoms began this morning. There's associated generalized weakness. She also reports lower bilateral abdominal pain with onset last night along with urinary retention. She reports drinking 3 bottles of water without producing a urine stream. Patient has a hx of urinary retention. There's no attempted treatment for abdominal pain. No UTI hx. She denies dysuria at this time.patient has slight elevated Troponin with no chest pain.she denies abdominal pain at this time.    * No surgery found *      Indwelling Lines/Drains at time of discharge:   Lines/Drains/Airways          No matching active lines, drains, or airways        Hospital Course:   This 89 y.o. Female with HTN, CAD and depression presents to the ED secondary to fatigue.  There's associated generalized weakness. She also reports lower bilateral abdominal pain with onset last night along with urinary retention. She reports drinking 3 bottles of water without producing a urine stream. Patient has a hx of urinary retention. There's no attempted treatment for abdominal pain. No UTI hx. She denies dysuria at this time.patient had slight elevated Troponin with no chest pain.she has been monitored  Telemetry with serial cardiac marks,remains flat with no chest pain,NO MI per cardiology,echo showed preserved EF,no sign of urinary retention on bladder scan,patient is leaving alone,refused going to [facility] house,PT,OT saw patient and  HH with SW,AID,nursing at D/C time arranged.  Patient will follow  with PCP in next few days.     Consults:   Consults         Status Ordering Provider     Inpatient consult to Cardiology  Once     Provider:  Dede Cervantes MD    Completed CHADWICK RICE          Significant Diagnostic Studies: Labs:   BMP:   Recent Labs  Lab 07/13/17  0905 07/14/17  0528    74   * 130*   K 5.0 5.0   CL 96 98   CO2 22* 23   BUN 21 24   CREATININE 0.9 1.0   CALCIUM 9.0 8.9   MG  --  1.5*   , CBC   Recent Labs  Lab 07/13/17  0905 07/14/17  0528   WBC 10.06 8.53   HGB 15.3 14.1   HCT 43.5 41.0    183    and Troponin   Recent Labs  Lab 07/13/17  2338   TROPONINI 0.199*     Radiology: X-Ray: CXR: X-Ray Chest 1 View (CXR): No results found for this visit on 07/13/17.  Cardiac Graphics: Echocardiogram:   2D echo with color flow doppler:   Results for orders placed or performed during the hospital encounter of 07/13/17   2D echo with color flow doppler   Result Value Ref Range    EF 65 55 - 65    Mitral Valve Regurgitation MODERATE (A)     Diastolic Dysfunction Yes (A)     Aortic Valve Regurgitation MODERATE (A)     Est. PA Systolic Pressure 55.06 (A)     Mitral Valve Mobility PROLAPSE     Tricuspid Valve Regurgitation MODERATE (A)        Pending Diagnostic Studies:     None        Final Active Diagnoses:    Diagnosis Date Noted POA    PRINCIPAL PROBLEM:  Elevated troponin [R74.8] 07/13/2017 Yes    Chronic diastolic CHF (congestive heart failure) [I50.32] 07/13/2017 Yes    Urinary retention [R33.9] 07/13/2017 Yes    Hyponatremia [E87.1] 12/31/2016 Yes    Chronic atrial fibrillation [I48.2] 12/31/2016 Yes      Problems Resolved During this Admission:    Diagnosis Date Noted Date Resolved POA      No new Assessment & Plan notes have been filed under this hospital service since the last note was generated.  Service: Hospital Medicine      Discharged Condition: stable    Disposition: Home-Health Care [facility]    Follow Up:  Follow-up Information     Omi Thomas MD In 1  "week.    Specialty:  Family Medicine  Contact information:  [address] [facility]  [address]  [place] [address]  [phone]                 Patient Instructions:     Diet general     Activity as tolerated       Medications:  Reconciled Home Medications:   Current Discharge Medication List      CONTINUE these medications which have NOT CHANGED    Details   alendronate (FOSAMAX) 10 MG Tab Take 5 mg by mouth once daily.      busPIRone (BUSPAR) 10 MG tablet Take 10 mg by mouth 2 (two) times daily.       dabigatran etexilate (PRADAXA) 75 mg Cap Take 75 mg by mouth 2 (two) times daily. "Do NOT break, chew, or open capsules."      furosemide (LASIX) 20 MG tablet Take 1 tablet (20 mg total) by mouth 2 (two) times daily.  Qty: 60 tablet, Refills: 0      lisinopril 10 MG tablet Take 1 tablet (10 mg total) by mouth once daily.  Qty: 30 tablet, Refills: 0      metoprolol tartrate (LOPRESSOR) 50 MG tablet Take 50 mg by mouth 2 (two) times daily.      mirtazapine (REMERON) 15 MG tablet Take 15 mg by mouth every evening.      MV, MIN #36/IRON,CARBONYL/FA (GERITOL COMPLETE ORAL) Take by mouth once daily.       acetaminophen (TYLENOL) 325 MG tablet Take 325 mg by mouth Daily.         STOP taking these medications       spironolactone (ALDACTONE) 25 MG tablet Comments:   Reason for Stopping:             Time spent on the discharge of patient: 30  minutes    Lisa Ramos MD  Department of Hospital Medicine  [facility]  "

## 2017-07-14 NOTE — PLAN OF CARE
"   07/14/17 1129   Final Note   Assessment Type Discharge Planning Assessment   Discharge Disposition Home   Discharge planning education complete? Yes   What phone number can be called within the next 1-3 days to see how you are doing after discharge? [phone]   Hospital Follow Up  Appt(s) scheduled? Yes   Discharge plans and expectations educations in teach back method with documentation complete? Yes   Offered [facility] -- Bedside Delivery? n/a   Discharge/Hospital Encounter Summary to ([facility]) PCP No   Referral to Outpatient Case Management complete? No   Referral to / orders for Home Health Complete? Yes   30 day supply of medicines given at discharge, if documented non-compliance / non-adherence? No   Any social issues identified prior to discharge? No   Did you assess the readiness or willingness of the family or caregiver to support self management of care? Yes     Patient / Family provided with educational information on Hypertension.  Information reviewed and placed in :My Healthcare Packet" to be brought home for patient / family  to use as resource after discharge.  Information included:  signs and symptoms to look for reviewed. Signs and symptoms included but not limited to: Chest pain or shortness of breath (call 911), Moderate or severe headache, Weakness in muscles of face, arms, or legs, Trouble speaking, Extreme drowsiness, Confusion, Change in vision, Fainting or dizziness, Pulsating or rushing sound in your ears, Unexplained nosebleed, Weakness, tingling or numbness of your face, arms, or legs, Difficulty speaking or weakness in any muscle, and Blood Pressure measured at home greater than 180/110. SW asked pt to verbalize two signs and symptoms that would warrant a call to PCP or emergency room.    Reminded patient things they will be responsible for to manage healthcare at home: getting Rx filled, attending follow up appointments, and taking medication as prescribed were " discussed. Pt informed SW she lives at home alone but her son Kevin and daughters bring her to her medical appointments.     Family expressed they are very concerned about pt living home alone but the pt does not want to move with any of her family members. According to Heather, she and Teodora live out of town, and Kevin lives in [place] but they do not believe the pt has enough care at home. PT/OT recommended HH. Order received for HH.  Facesheet, Orders, H&P with med list sent to [facility] via [facility].  Awaiting return call to confirm provider.

## 2017-07-14 NOTE — HOSPITAL COURSE
This 89 y.o. Female with HTN, CAD and depression presents to the ED secondary to fatigue.  There's associated generalized weakness. She also reports lower bilateral abdominal pain with onset last night along with urinary retention. She reports drinking 3 bottles of water without producing a urine stream. Patient has a hx of urinary retention. There's no attempted treatment for abdominal pain. No UTI hx. She denies dysuria at this time.patient had slight elevated Troponin with no chest pain.she has been monitored  Telemetry with serial cardiac marks,remains flat with no chest pain,NO MI per cardiology,echo showed preserved EF,no sign of urinary retention on bladder scan,patient is leaving alone,refused going to daughter house,PT,OT saw patient and  HH with SW,AID,nursing at D/C time arranged.  Patient will follow with PCP in next few days.

## 2017-07-14 NOTE — PLAN OF CARE
[facility] HEALTH ORDERS  FACE TO FACE ENCOUNTER    Patient Name: Rachael Almaguer  YOB: 1925    PCP: Omi Thomas MD   PCP Address: [address] [place] [address] / [place] [address]*  PCP Phone Number: [phone]  PCP Fax: [phone]    Encounter Date: 07/14/2017    Admit to Home Health    Diagnoses:  Active Hospital Problems    Diagnosis  POA    *Elevated troponin [R74.8]  Yes     Priority: 1 - High    Chronic diastolic CHF (congestive heart failure) [I50.32]  Yes    Urinary retention [R33.9]  Yes    Hyponatremia [E87.1]  Yes    Chronic atrial fibrillation [I48.2]  Yes      Resolved Hospital Problems    Diagnosis Date Resolved POA   No resolved problems to display.       No future appointments.  Follow-up Information     Omi hTomas MD In 1 week.    Specialty:  Family Medicine  Contact information:  [address] [place], [place] [address]  [phone]                     I have seen and examined this patient face to face today. My clinical findings that support the need for the home health skilled services and home bound status are the following:  Medical restrictions requiring assistance of another human to leave home due to  Fluid/volume overload.    Allergies:  Review of patient's allergies indicates:   Allergen Reactions    Aspirin     Xanax [alprazolam]        Diet: cardiac diet    Activities: activity as tolerated    Nursing:   SN to complete comprehensive assessment including routine vital signs. Instruct on disease process and s/s of complications to report to MD. Review/verify medication list sent home with the patient at time of discharge  and instruct patient/caregiver as needed. Frequency may be adjusted depending on start of care date.    Notify MD if SBP > 160 or < 90; DBP > 90 or < 50; HR > 120 or < 50; Temp > 101; Other:         CONSULTS:     to evaluate for community  "resources/long-range planning.  Aide to provide assistance with personal care, ADLs, and vital signs.    MISCELLANEOUS CARE:  Routine Skin for Bedridden Patients: Instruct patient/caregiver to apply moisture barrier cream to all skin folds and wet areas in perineal area daily and after baths and all bowel movements.    WOUND CARE ORDERS  n/a      Medications: Review discharge medications with patient and family and provide education.      Current Discharge Medication List      CONTINUE these medications which have NOT CHANGED    Details   alendronate (FOSAMAX) 10 MG Tab Take 5 mg by mouth once daily.      busPIRone (BUSPAR) 10 MG tablet Take 10 mg by mouth 2 (two) times daily.       dabigatran etexilate (PRADAXA) 75 mg Cap Take 75 mg by mouth 2 (two) times daily. "Do NOT break, chew, or open capsules."      furosemide (LASIX) 20 MG tablet Take 1 tablet (20 mg total) by mouth 2 (two) times daily.  Qty: 60 tablet, Refills: 0      lisinopril 10 MG tablet Take 1 tablet (10 mg total) by mouth once daily.  Qty: 30 tablet, Refills: 0      metoprolol tartrate (LOPRESSOR) 50 MG tablet Take 50 mg by mouth 2 (two) times daily.      mirtazapine (REMERON) 15 MG tablet Take 15 mg by mouth every evening.      MV, MIN #36/IRON,CARBONYL/FA (GERITOL COMPLETE ORAL) Take by mouth once daily.       acetaminophen (TYLENOL) 325 MG tablet Take 325 mg by mouth Daily.         STOP taking these medications       spironolactone (ALDACTONE) 25 MG tablet Comments:   Reason for Stopping:               I certify that this patient is confined to her home and needs intermittent skilled nursing care.      "

## 2017-07-14 NOTE — PLAN OF CARE
Problem: Patient Care Overview  Goal: Plan of Care Review  Outcome: Ongoing (interventions implemented as appropriate)   07/14/17 0029   Coping/Psychosocial   Plan Of Care Reviewed With patient

## 2017-07-15 LAB — BACTERIA UR CULT: NO GROWTH

## 2017-07-20 ENCOUNTER — HOSPITAL ENCOUNTER (INPATIENT)
Facility: HOSPITAL | Age: 82
LOS: 13 days | Discharge: HOSPICE | DRG: 070 | End: 2017-08-02
Attending: EMERGENCY MEDICINE | Admitting: HOSPITALIST
Payer: MEDICARE

## 2017-07-20 DIAGNOSIS — G93.41 METABOLIC ENCEPHALOPATHY: Primary | ICD-10-CM

## 2017-07-20 DIAGNOSIS — R44.3 HALLUCINATIONS: ICD-10-CM

## 2017-07-20 DIAGNOSIS — E87.20 LACTIC ACIDOSIS: ICD-10-CM

## 2017-07-20 DIAGNOSIS — N17.9 ACUTE RENAL FAILURE, UNSPECIFIED ACUTE RENAL FAILURE TYPE: ICD-10-CM

## 2017-07-20 DIAGNOSIS — R44.1 VISUAL HALLUCINATIONS: ICD-10-CM

## 2017-07-20 DIAGNOSIS — R41.82 ALTERED MENTAL STATUS, UNSPECIFIED ALTERED MENTAL STATUS TYPE: ICD-10-CM

## 2017-07-20 DIAGNOSIS — I49.9 ARRHYTHMIA: ICD-10-CM

## 2017-07-20 DIAGNOSIS — E87.5 HYPERKALEMIA: ICD-10-CM

## 2017-07-20 PROBLEM — D64.9 ANEMIA: Status: ACTIVE | Noted: 2017-07-20

## 2017-07-20 PROBLEM — I50.32 CHRONIC DIASTOLIC CONGESTIVE HEART FAILURE: Chronic | Status: ACTIVE | Noted: 2017-01-02

## 2017-07-20 PROBLEM — N18.30 ACUTE RENAL FAILURE SUPERIMPOSED ON STAGE 3 CHRONIC KIDNEY DISEASE: Status: ACTIVE | Noted: 2017-07-20

## 2017-07-20 LAB
ALBUMIN SERPL BCP-MCNC: 3.3 G/DL
ALP SERPL-CCNC: 61 U/L
ALT SERPL W/O P-5'-P-CCNC: 66 U/L
AMORPH CRY URNS QL MICRO: NORMAL
AMPHET+METHAMPHET UR QL: NEGATIVE
ANION GAP SERPL CALC-SCNC: 30 MMOL/L
APTT BLDCRRT: 44.8 SEC
AST SERPL-CCNC: 102 U/L
BACTERIA #/AREA URNS HPF: NORMAL /HPF
BARBITURATES UR QL SCN>200 NG/ML: NEGATIVE
BASOPHILS # BLD AUTO: 0.01 K/UL
BASOPHILS NFR BLD: 0.1 %
BENZODIAZ UR QL SCN>200 NG/ML: NEGATIVE
BILIRUB SERPL-MCNC: 2.1 MG/DL
BILIRUB UR QL STRIP: ABNORMAL
BUN SERPL-MCNC: 57 MG/DL
BZE UR QL SCN: NEGATIVE
CALCIUM SERPL-MCNC: 9.1 MG/DL
CANNABINOIDS UR QL SCN: NEGATIVE
CHLORIDE SERPL-SCNC: 91 MMOL/L
CLARITY UR: ABNORMAL
CO2 SERPL-SCNC: 15 MMOL/L
COLOR UR: YELLOW
CREAT SERPL-MCNC: 1.8 MG/DL
CREAT UR-MCNC: 137.3 MG/DL
DIFFERENTIAL METHOD: ABNORMAL
EOSINOPHIL # BLD AUTO: 0 K/UL
EOSINOPHIL NFR BLD: 0 %
ERYTHROCYTE [DISTWIDTH] IN BLOOD BY AUTOMATED COUNT: 13.3 %
EST. GFR  (AFRICAN AMERICAN): 28 ML/MIN/1.73 M^2
EST. GFR  (NON AFRICAN AMERICAN): 24 ML/MIN/1.73 M^2
GLUCOSE SERPL-MCNC: 75 MG/DL
GLUCOSE UR QL STRIP: NEGATIVE
HCT VFR BLD AUTO: 30.9 %
HCT VFR BLD AUTO: 37.6 %
HGB BLD-MCNC: 10.5 G/DL
HGB BLD-MCNC: 13.2 G/DL
HGB UR QL STRIP: NEGATIVE
HYALINE CASTS #/AREA URNS LPF: 0 /LPF
INR PPP: 1.8
KETONES UR QL STRIP: NEGATIVE
LACTATE SERPL-SCNC: 2.7 MMOL/L
LEUKOCYTE ESTERASE UR QL STRIP: NEGATIVE
LYMPHOCYTES # BLD AUTO: 0.9 K/UL
LYMPHOCYTES NFR BLD: 10.5 %
MAGNESIUM SERPL-MCNC: 2.2 MG/DL
MCH RBC QN AUTO: 32.6 PG
MCHC RBC AUTO-ENTMCNC: 34 G/DL
MCV RBC AUTO: 96 FL
METHADONE UR QL SCN>300 NG/ML: NEGATIVE
MICROSCOPIC COMMENT: NORMAL
MONOCYTES # BLD AUTO: 1 K/UL
MONOCYTES NFR BLD: 11.5 %
NEUTROPHILS # BLD AUTO: 6.7 K/UL
NEUTROPHILS NFR BLD: 77.9 %
NITRITE UR QL STRIP: NEGATIVE
OPIATES UR QL SCN: NEGATIVE
PCP UR QL SCN>25 NG/ML: NEGATIVE
PH UR STRIP: 6 [PH] (ref 5–8)
PLATELET # BLD AUTO: 158 K/UL
PMV BLD AUTO: 8.8 FL
POTASSIUM SERPL-SCNC: 5.8 MMOL/L
PROT SERPL-MCNC: 7.4 G/DL
PROT UR QL STRIP: ABNORMAL
PROTHROMBIN TIME: 18.4 SEC
RBC # BLD AUTO: 3.22 M/UL
RBC #/AREA URNS HPF: 0 /HPF (ref 0–4)
SODIUM SERPL-SCNC: 136 MMOL/L
SP GR UR STRIP: 1.03 (ref 1–1.03)
SQUAMOUS #/AREA URNS HPF: NORMAL /HPF
T4 FREE SERPL-MCNC: 1.7 NG/DL
TOXICOLOGY INFORMATION: NORMAL
TROPONIN I SERPL DL<=0.01 NG/ML-MCNC: 0.04 NG/ML
TSH SERPL DL<=0.005 MIU/L-ACNC: 0.28 UIU/ML
URN SPEC COLLECT METH UR: ABNORMAL
UROBILINOGEN UR STRIP-ACNC: NEGATIVE EU/DL
WBC # BLD AUTO: 8.6 K/UL
WBC #/AREA URNS HPF: 2 /HPF (ref 0–5)

## 2017-07-20 PROCEDURE — 96365 THER/PROPH/DIAG IV INF INIT: CPT

## 2017-07-20 PROCEDURE — 80307 DRUG TEST PRSMV CHEM ANLYZR: CPT

## 2017-07-20 PROCEDURE — 85014 HEMATOCRIT: CPT

## 2017-07-20 PROCEDURE — P9612 CATHETERIZE FOR URINE SPEC: HCPCS

## 2017-07-20 PROCEDURE — 96360 HYDRATION IV INFUSION INIT: CPT | Mod: 59

## 2017-07-20 PROCEDURE — 96361 HYDRATE IV INFUSION ADD-ON: CPT

## 2017-07-20 PROCEDURE — 85018 HEMOGLOBIN: CPT

## 2017-07-20 PROCEDURE — 96368 THER/DIAG CONCURRENT INF: CPT

## 2017-07-20 PROCEDURE — 25000003 PHARM REV CODE 250: Performed by: EMERGENCY MEDICINE

## 2017-07-20 PROCEDURE — 99285 EMERGENCY DEPT VISIT HI MDM: CPT | Mod: 25

## 2017-07-20 PROCEDURE — 25000003 PHARM REV CODE 250: Performed by: NURSE PRACTITIONER

## 2017-07-20 PROCEDURE — 83735 ASSAY OF MAGNESIUM: CPT

## 2017-07-20 PROCEDURE — 93005 ELECTROCARDIOGRAM TRACING: CPT

## 2017-07-20 PROCEDURE — 87086 URINE CULTURE/COLONY COUNT: CPT

## 2017-07-20 PROCEDURE — 85025 COMPLETE CBC W/AUTO DIFF WBC: CPT

## 2017-07-20 PROCEDURE — 85730 THROMBOPLASTIN TIME PARTIAL: CPT

## 2017-07-20 PROCEDURE — 11000001 HC ACUTE MED/SURG PRIVATE ROOM

## 2017-07-20 PROCEDURE — 36415 COLL VENOUS BLD VENIPUNCTURE: CPT

## 2017-07-20 PROCEDURE — 83605 ASSAY OF LACTIC ACID: CPT

## 2017-07-20 PROCEDURE — 84484 ASSAY OF TROPONIN QUANT: CPT

## 2017-07-20 PROCEDURE — 87040 BLOOD CULTURE FOR BACTERIA: CPT

## 2017-07-20 PROCEDURE — 81000 URINALYSIS NONAUTO W/SCOPE: CPT

## 2017-07-20 PROCEDURE — 96366 THER/PROPH/DIAG IV INF ADDON: CPT

## 2017-07-20 PROCEDURE — 84443 ASSAY THYROID STIM HORMONE: CPT

## 2017-07-20 PROCEDURE — 85610 PROTHROMBIN TIME: CPT

## 2017-07-20 PROCEDURE — 84439 ASSAY OF FREE THYROXINE: CPT

## 2017-07-20 PROCEDURE — 63600175 PHARM REV CODE 636 W HCPCS: Performed by: EMERGENCY MEDICINE

## 2017-07-20 PROCEDURE — 80053 COMPREHEN METABOLIC PANEL: CPT

## 2017-07-20 RX ORDER — CIPROFLOXACIN 2 MG/ML
400 INJECTION, SOLUTION INTRAVENOUS
Status: DISCONTINUED | OUTPATIENT
Start: 2017-07-20 | End: 2017-07-20

## 2017-07-20 RX ORDER — CEFEPIME HYDROCHLORIDE 1 G/50ML
1 INJECTION, SOLUTION INTRAVENOUS
Status: DISCONTINUED | OUTPATIENT
Start: 2017-07-20 | End: 2017-07-24

## 2017-07-20 RX ORDER — SODIUM CHLORIDE 9 MG/ML
INJECTION, SOLUTION INTRAVENOUS CONTINUOUS
Status: CANCELLED | OUTPATIENT
Start: 2017-07-20

## 2017-07-20 RX ORDER — SODIUM CHLORIDE 9 MG/ML
INJECTION, SOLUTION INTRAVENOUS CONTINUOUS
Status: ACTIVE | OUTPATIENT
Start: 2017-07-20 | End: 2017-07-21

## 2017-07-20 RX ORDER — HYDRALAZINE HYDROCHLORIDE 20 MG/ML
10 INJECTION INTRAMUSCULAR; INTRAVENOUS EVERY 8 HOURS PRN
Status: DISCONTINUED | OUTPATIENT
Start: 2017-07-20 | End: 2017-07-25

## 2017-07-20 RX ORDER — SODIUM CHLORIDE 9 MG/ML
INJECTION, SOLUTION INTRAVENOUS CONTINUOUS
Status: DISCONTINUED | OUTPATIENT
Start: 2017-07-20 | End: 2017-07-20

## 2017-07-20 RX ORDER — CIPROFLOXACIN 2 MG/ML
400 INJECTION, SOLUTION INTRAVENOUS
Status: DISCONTINUED | OUTPATIENT
Start: 2017-07-20 | End: 2017-07-21

## 2017-07-20 RX ADMIN — VANCOMYCIN HYDROCHLORIDE 1000 MG: 1 INJECTION, POWDER, LYOPHILIZED, FOR SOLUTION INTRAVENOUS at 04:07

## 2017-07-20 RX ADMIN — SODIUM CHLORIDE 75 ML/HR: 0.9 INJECTION, SOLUTION INTRAVENOUS at 09:07

## 2017-07-20 RX ADMIN — CEFEPIME 1 G: 1 INJECTION, POWDER, FOR SOLUTION INTRAMUSCULAR; INTRAVENOUS at 03:07

## 2017-07-20 RX ADMIN — CIPROFLOXACIN 400 MG: 2 INJECTION, SOLUTION INTRAVENOUS at 03:07

## 2017-07-20 RX ADMIN — SODIUM CHLORIDE 500 ML: 0.9 INJECTION, SOLUTION INTRAVENOUS at 11:07

## 2017-07-20 NOTE — ED TRIAGE NOTES
"Patient presents to ED with son. Son reports that patient was in ED on Saturday due to inability to void. Sine leaving the ED, patient has been hallucinating and has had "fluids in legs".    "

## 2017-07-20 NOTE — ED PROVIDER NOTES
"Encounter Date: 7/20/2017    SCRIBE #1 NOTE: I, Meng Herrera, am scribing for, and in the presence of,  Boris Valencia MD. I have scribed the following portions of the note - Other sections scribed: HPI/ROS.       History     Chief Complaint   Patient presents with    Hallucinations     pt here with son who states seeing people who are not there, angels, and holes in floor. pt states pain all over body and just don't feel good     CC: Hallucinations    89 y.o.female with HTN, CAD, and depression presents to the ED in the care of her son who reports that the patient has been experiencing hallucinations since discharge from this hospital this past Saturday. The patient is seeing "angels and holes in the ground" and the patient is also speaking to "things that aren't there." She was evaluated at this hospital this past Saturday for urinary retention and discharged. Since then, her legs have been swelling as well. She endorses chronic back pain and knee pain. She has had posterior neck pain for the last month that is atraumatic in nature. Son reports that the patient was evaluated by Dr. Carmichael and was told that she has fluid in her lungs. Also, she went to the cardiologist this morning where she was assessed and advised to come to the ED to be evaluated for these hallucinations.       The history is provided by the patient and a relative.     Review of patient's allergies indicates:   Allergen Reactions    Aspirin     Xanax [alprazolam]      Past Medical History:   Diagnosis Date    Coronary artery disease     Depression     ON WELLBUTRIN    Hypertension      History reviewed. No pertinent surgical history.  History reviewed. No pertinent family history.  Social History   Substance Use Topics    Smoking status: Former Smoker    Smokeless tobacco: Never Used    Alcohol use No     Review of Systems   Constitutional: Negative for chills and fever.   HENT: Negative for ear pain and sore throat.    Eyes: Negative " for pain.   Respiratory: Negative for cough and shortness of breath.    Cardiovascular: Positive for leg swelling. Negative for chest pain.   Gastrointestinal: Negative for abdominal pain, diarrhea, nausea and vomiting.   Genitourinary: Negative for dysuria and hematuria.   Musculoskeletal: Positive for arthralgias (bilateral knees), back pain and neck pain. Negative for myalgias (arm or leg pain).   Skin: Negative for rash.   Neurological: Negative for headaches.   Psychiatric/Behavioral: Positive for behavioral problems and hallucinations.       Physical Exam     Initial Vitals [07/20/17 1034]   BP Pulse Resp Temp SpO2   (!) 145/55 78 18 -- --      MAP       85         Physical Exam  The patient was examined specifically for the following:   General:No significant distress, Good color, Warm and dry. Head and neck:Scalp atraumatic, Neck supple. Neurological:Appropriate conversation, Gross motor deficits. Eyes:Conjugate gaze, Clear corneas. ENT: No epistaxis. Cardiac: Regular rate and rhythm, Grossly normal heart tones. Pulmonary: Wheezing, Rales. Gastrointestinal: Abdominal tenderness, Abdominal distention. Musculoskeletal: Extremity deformity, Apparent pain with range of motion of the joints. Skin: Rash.   The findings on examination were normal except for the following: The patient has an irregularly irregular heartbeat.  There is no tachycardia or bradycardia.  The heart tones are otherwise unremarkable.  The lungs are clear and free wheezing rales of the rhonchi.  The abdomen is nontender.  The neck is supple.  The patient is bright and cheerful.  The neck is supple.  Cranial nerves, motor and sensory examination are grossly unremarkable.  The patient is very hard of hearing.  She is hallucinating and talking to people who are not there during the physical examination.  ED Course   Critical Care  Date/Time: 8/3/2017 4:34 AM  Performed by: PHILOMENA LEGGETT  Authorized by: KIRBY ZALDIVAR   Direct patient  critical care time: 22 minutes  Additional history critical care time: 11 minutes  Ordering / reviewing critical care time: 9 minutes  Documentation critical care time: 15 minutes  Consulting other physicians critical care time: 4 minutes  Total critical care time (exclusive of procedural time) : 61 minutes  Critical care time was exclusive of separately billable procedures and treating other patients and teaching time.  Critical care was necessary to treat or prevent imminent or life-threatening deterioration of the following conditions: metabolic crisis.  Critical care was time spent personally by me on the following activities: development of treatment plan with patient or surrogate, discussions with primary provider, evaluation of patient's response to treatment, examination of patient, obtaining history from patient or surrogate, ordering and review of laboratory studies, ordering and review of radiographic studies, re-evaluation of patient's condition and review of old charts.        Labs Reviewed   CBC W/ AUTO DIFFERENTIAL - Abnormal; Notable for the following:        Result Value    RBC 3.22 (*)     Hemoglobin 10.5 (*)     Hematocrit 30.9 (*)     MCH 32.6 (*)     MPV 8.8 (*)     Lymph # 0.9 (*)     Gran% 77.9 (*)     Lymph% 10.5 (*)     All other components within normal limits   LACTIC ACID, PLASMA - Abnormal; Notable for the following:     Lactate (Lactic Acid) 2.7 (*)     All other components within normal limits   TSH - Abnormal; Notable for the following:     TSH 0.280 (*)     All other components within normal limits   URINALYSIS - Abnormal; Notable for the following:     Appearance, UA Hazy (*)     Protein, UA 2+ (*)     Bilirubin (UA) 1+ (*)     All other components within normal limits   TROPONIN I - Abnormal; Notable for the following:     Troponin I 0.043 (*)     All other components within normal limits   PROTIME-INR - Abnormal; Notable for the following:     Prothrombin Time 18.4 (*)     INR 1.8  (*)     All other components within normal limits   APTT - Abnormal; Notable for the following:     aPTT 44.8 (*)     All other components within normal limits   COMPREHENSIVE METABOLIC PANEL - Abnormal; Notable for the following:     Potassium 5.8 (*)     Chloride 91 (*)     CO2 15 (*)     BUN, Bld 57 (*)     Creatinine 1.8 (*)     Albumin 3.3 (*)     Total Bilirubin 2.1 (*)      (*)     ALT 66 (*)     Anion Gap 30 (*)     eGFR if  28 (*)     eGFR if non  24 (*)     All other components within normal limits    Narrative:     Recoll. [id number] by [facility] at 07/20/2017 13:22, reason: Redraw to   confirm lab values.   T4, FREE - Abnormal; Notable for the following:     Free T4 1.70 (*)     All other components within normal limits   CULTURE, URINE   MAGNESIUM    Narrative:     Recoll. [id number] by [facility] at 07/20/2017 13:22, reason: Redraw to   confirm lab values.   DRUG SCREEN PANEL, URINE EMERGENCY   URINALYSIS MICROSCOPIC     EKG Readings: (Independently Interpreted)   This patient is in atrial fibrillation with a heart rate of 83.  There is poor R-wave progression across precordium.  The patient has an incomplete right bundle branch block.  ST segment and T waves are normal.       X-Rays:   Independently Interpreted Readings:   Other Readings:  CT of the head fails to reveal significant abnormalities.  Chest x-ray fails to reveal pneumothorax pneumonia pleural effusion.     Medical decision making: Given the above, this patient presents to the emergency with an altered mental status.  The patient has an elevated lactate but looks like a metabolic acidosis and hyperkalemia.  I'm wondering whether this patient is dehydrated.  Sepsis is in the differential diagnosis to I cannot find a focus the neck is supple.  I doubt meningitis.  The patient denies headache.  She is otherwise well she is very alert.  I will admit her for rehydration and antibiotic therapy and blood cultures.  I  will defer to internal medicine about a neurology consult or psychiatry consult.  These visual hallucinations this may well be a delirium.              Scribe Attestation:   Scribe #1: I performed the above scribed service and the documentation accurately describes the services I performed. I attest to the accuracy of the note.    Attending Attestation:           Physician Attestation for Scribe:  Physician Attestation Statement for Scribe #1: I, Boris Valencia MD, reviewed documentation, as scribed by Meng Herrera in my presence, and it is both accurate and complete.                 ED Course     Clinical Impression:   The primary encounter diagnosis was Visual hallucinations. Diagnoses of Hallucinations, Hyperkalemia, Lactic acidosis, Acute renal failure, unspecified acute renal failure type, and Altered mental status, unspecified altered mental status type were also pertinent to this visit.                           Boris Valencia MD  07/21/17 0832       Boris Valencia MD  08/03/17 [id number]

## 2017-07-20 NOTE — SUBJECTIVE & OBJECTIVE
"Past Medical History:   Diagnosis Date    Coronary artery disease     Depression     ON WELLBUTRIN    Hypertension        History reviewed. No pertinent surgical history.    Review of patient's allergies indicates:   Allergen Reactions    Aspirin     Xanax [alprazolam]        No current facility-administered medications on file prior to encounter.      Current Outpatient Prescriptions on File Prior to Encounter   Medication Sig    alendronate (FOSAMAX) 10 MG Tab Take 5 mg by mouth once daily.    busPIRone (BUSPAR) 10 MG tablet Take 10 mg by mouth 2 (two) times daily.     dabigatran etexilate (PRADAXA) 75 mg Cap Take 75 mg by mouth 2 (two) times daily. "Do NOT break, chew, or open capsules."    furosemide (LASIX) 20 MG tablet Take 1 tablet (20 mg total) by mouth 2 (two) times daily.    lisinopril 10 MG tablet Take 1 tablet (10 mg total) by mouth once daily.    metoprolol tartrate (LOPRESSOR) 50 MG tablet Take 50 mg by mouth 2 (two) times daily.    mirtazapine (REMERON) 15 MG tablet Take 15 mg by mouth every evening.    MV, MIN #36/IRON,CARBONYL/FA (GERITOL COMPLETE ORAL) Take by mouth once daily.     acetaminophen (TYLENOL) 325 MG tablet Take 325 mg by mouth Daily.     Family History     None        Social History Main Topics    Smoking status: Former Smoker    Smokeless tobacco: Never Used    Alcohol use No    Drug use: No    Sexual activity: Not Currently     Review of Systems   Constitutional: Negative for appetite change, chills, diaphoresis and fever.   HENT: Negative for congestion, hearing loss, sore throat, tinnitus and trouble swallowing.    Eyes: Negative for photophobia, discharge, itching and visual disturbance.   Respiratory: Negative for apnea, cough, wheezing and stridor.    Cardiovascular: Negative for chest pain, palpitations and leg swelling.   Gastrointestinal: Negative for abdominal distention, abdominal pain, blood in stool, constipation, diarrhea and nausea.   Endocrine: " Negative for polydipsia, polyphagia and polyuria.   Genitourinary: Negative for difficulty urinating, dysuria, flank pain and frequency.   Musculoskeletal: Negative for arthralgias, joint swelling and neck stiffness.   Skin: Negative for color change, rash and wound.   Neurological: Negative for dizziness, tremors, seizures, light-headedness, numbness and headaches.   Hematological: Negative for adenopathy.   Psychiatric/Behavioral: Positive for hallucinations. Negative for self-injury.     Objective:     Vital Signs (Most Recent):  Temp: 97.5 °F (36.4 °C) (07/20/17 1429)  Pulse: 76 (07/20/17 1632)  Resp: 15 (07/20/17 1632)  BP: (!) 150/56 (07/20/17 1632)  SpO2: 100 % (07/20/17 1632) Vital Signs (24h Range):  Temp:  [97.5 °F (36.4 °C)] 97.5 °F (36.4 °C)  Pulse:  [73-78] 76  Resp:  [15-18] 15  SpO2:  [97 %-100 %] 100 %  BP: (109-150)/(55-56) 150/56     Weight: 66.2 kg (146 lb)  Body mass index is 24.3 kg/m².    Physical Exam   Constitutional: She is oriented to person, place, and time. She appears well-developed and well-nourished. She is cooperative.   Small framed Body mass index is 24.29 kg/m².   HENT:   Head: Normocephalic and atraumatic.   Eyes: Conjunctivae, EOM and lids are normal. Pupils are equal, round, and reactive to light. Right eye exhibits no discharge. Left eye exhibits no discharge. No scleral icterus.   Neck: Normal range of motion and full passive range of motion without pain. Neck supple. No JVD present. No edema present. No thyroid mass present.   Cardiovascular: Normal rate, S1 normal, S2 normal and intact distal pulses.    No murmur heard.  Pulmonary/Chest: Effort normal.   Abdominal: Soft. Bowel sounds are normal. She exhibits no distension and no abdominal bruit. There is no splenomegaly or hepatomegaly. There is no tenderness. There is no CVA tenderness.   Musculoskeletal: She exhibits edema.   Non-pitting LE edema   Lymphadenopathy:     She has no cervical adenopathy.     She has no  axillary adenopathy.   Neurological: She is alert and oriented to person, place, and time. She has normal reflexes. She displays no tremor. She displays no seizure activity.   Cooperative; Patient is strangely alert and oriented to self time place and situation but still seeing people who aren't there   Skin: Skin is warm, dry and intact.   Easy bruising   Psychiatric: She has a normal mood and affect. Her speech is normal. Thought content normal. Cognition and memory are normal.        Significant Labs:   Blood Culture: No results for input(s): LABBLOO in the last 48 hours.  BMP:   Recent Labs  Lab 07/20/17  1230 07/20/17  1316   GLU 75  --      --    K 5.8*  --    CL 91*  --    CO2 15*  --    BUN 57*  --    CREATININE 1.8*  --    CALCIUM 9.1  --    MG  --  2.2     CBC:   Recent Labs  Lab 07/20/17  1250   WBC 8.60   HGB 10.5*   HCT 30.9*        Cardiac Markers: No results for input(s): CKMB, MYOGLOBIN, BNP, TROPISTAT in the last 48 hours.  Lactic Acid:   Recent Labs  Lab 07/20/17  1316   LACTATE 2.7*     Lipase: No results for input(s): LIPASE in the last 48 hours.  Lipid Panel: No results for input(s): CHOL, HDL, LDLCALC, TRIG, CHOLHDL in the last 48 hours.  Magnesium:   Recent Labs  Lab 07/20/17  1316   MG 2.2     Troponin:   Recent Labs  Lab 07/20/17  1230   TROPONINI 0.043*     TSH:   Recent Labs  Lab 07/20/17  1230   TSH 0.280*     Urine Culture: No results for input(s): LABURIN in the last 48 hours.    Significant Imaging:   Imaging Results          CT Head Without Contrast (Final result)  Result time 07/20/17 13:11:25    Final result by Teresa Taylor MD (07/20/17 13:11:25)                 Impression:         Noncontrast CT demonstrates no evidence of acute intracranial pathology.      Electronically signed by: TERESA TAYLOR MD  Date:     07/20/17  Time:    13:11              Narrative:    CT head without contrast    07/20/17 11:18:02    Accession# [id number]    CLINICAL INDICATION: 89 year old  F with  hallucinations    TECHNIQUE: Axial CT images obtained throughout the region of the head without the use of intravenous contrast. Axial, sagittal and coronal reconstructions were performed.    COMPARISON: No priors.    FINDINGS:    Mild generalized cerebral volume loss, not unusually dense for age.No parenchymal mass, hemorrhage, edema or major vascular distribution infarct.    No extra-axial blood or fluid collections.    The cranium appears intact. Mastoid air cells and paranasal sinuses are essentially clear.    Prior cataract surgery.                             X-Ray Chest AP Portable (Final result)  Result time 07/20/17 11:40:57    Final result by Maldonado Gonzáles MD (07/20/17 11:40:57)                 Impression:         Poorly visualized left hemidiaphragm, possible left basilar effusion, atelectasis, and/or infiltrate. Short-term PA and lateral chest followup could be performed to ensure stability/resolution.       Electronically signed by: Maldonado Gonzáles MD  Date:     07/20/17  Time:    11:40              Narrative:    AP Portable Chest    Comparison: none.     Findings:  The cardiomediastinal contour is within normal limits.   Left lung base/ hemidiaphragm not well visualized.  No gross pleural effusions or pneumothorax identified.  Prior right shoulder arthroplasty.

## 2017-07-20 NOTE — HPI
"Rachael Almaguer is a 89 y.o. with medical history HTN, CAD, and depression. She presented for evaluation of 5 day history of hallucinations since discharge from this hospital on Saturday. Patient was admitted for urinary retension. History taken from patients chart and patient.    Per patient, who surprisingly gives good history except for the visual hallucinations, she had been seeing people who are not there. She tells me that her 10 year old granddaughter is in the chair in the room during history taking. However, she is able to tell me that on Monday she saw her PCP who told her "you don't look good". She also saw her cardiologist (Dr. Whiting) today who told her to come to the hospital. Additionally, patient reports BLLE swelling.    Patient is found to have significant drop in H/H since 2 weeks ago was 14.1/41.0 now 10.5/30.9; dehydration with BUN/CR/GFR 57/1.8/24 compared to Yonatan her renal functions were normal.          "

## 2017-07-21 PROBLEM — G93.41 METABOLIC ENCEPHALOPATHY: Status: ACTIVE | Noted: 2017-07-20

## 2017-07-21 PROBLEM — Z66 DNR (DO NOT RESUSCITATE): Status: ACTIVE | Noted: 2017-07-21

## 2017-07-21 LAB
ALBUMIN SERPL BCP-MCNC: 3.1 G/DL
ALP SERPL-CCNC: 59 U/L
ALT SERPL W/O P-5'-P-CCNC: 64 U/L
ANION GAP SERPL CALC-SCNC: 7 MMOL/L
AST SERPL-CCNC: 91 U/L
BASOPHILS # BLD AUTO: 0.01 K/UL
BASOPHILS NFR BLD: 0.1 %
BILIRUB SERPL-MCNC: 1.8 MG/DL
BUN SERPL-MCNC: 57 MG/DL
BUN SERPL-MCNC: 58 MG/DL
CALCIUM SERPL-MCNC: 8.2 MG/DL
CALCIUM SERPL-MCNC: 8.2 MG/DL
CALCIUM SERPL-MCNC: 8.3 MG/DL
CALCIUM SERPL-MCNC: 8.3 MG/DL
CHLORIDE SERPL-SCNC: 89 MMOL/L
CHLORIDE SERPL-SCNC: 89 MMOL/L
CHLORIDE SERPL-SCNC: 91 MMOL/L
CHLORIDE SERPL-SCNC: 94 MMOL/L
CO2 SERPL-SCNC: 18 MMOL/L
CO2 SERPL-SCNC: 20 MMOL/L
CO2 SERPL-SCNC: 21 MMOL/L
CO2 SERPL-SCNC: 21 MMOL/L
CREAT SERPL-MCNC: 1.4 MG/DL
CREAT SERPL-MCNC: 1.5 MG/DL
CREAT SERPL-MCNC: 1.7 MG/DL
CREAT SERPL-MCNC: 1.7 MG/DL
DIFFERENTIAL METHOD: ABNORMAL
EOSINOPHIL # BLD AUTO: 0 K/UL
EOSINOPHIL NFR BLD: 0.3 %
ERYTHROCYTE [DISTWIDTH] IN BLOOD BY AUTOMATED COUNT: 13.5 %
EST. GFR  (AFRICAN AMERICAN): 30 ML/MIN/1.73 M^2
EST. GFR  (AFRICAN AMERICAN): 30 ML/MIN/1.73 M^2
EST. GFR  (AFRICAN AMERICAN): 35 ML/MIN/1.73 M^2
EST. GFR  (AFRICAN AMERICAN): 38 ML/MIN/1.73 M^2
EST. GFR  (NON AFRICAN AMERICAN): 26 ML/MIN/1.73 M^2
EST. GFR  (NON AFRICAN AMERICAN): 26 ML/MIN/1.73 M^2
EST. GFR  (NON AFRICAN AMERICAN): 30 ML/MIN/1.73 M^2
EST. GFR  (NON AFRICAN AMERICAN): 33 ML/MIN/1.73 M^2
GLUCOSE SERPL-MCNC: 100 MG/DL
GLUCOSE SERPL-MCNC: 115 MG/DL
GLUCOSE SERPL-MCNC: 71 MG/DL
GLUCOSE SERPL-MCNC: 71 MG/DL
HCT VFR BLD AUTO: 33.8 %
HCT VFR BLD AUTO: 36.8 %
HGB BLD-MCNC: 12 G/DL
HGB BLD-MCNC: 13.2 G/DL
LYMPHOCYTES # BLD AUTO: 1 K/UL
LYMPHOCYTES NFR BLD: 11.7 %
MCH RBC QN AUTO: 34.5 PG
MCHC RBC AUTO-ENTMCNC: 35.9 G/DL
MCV RBC AUTO: 96 FL
MONOCYTES # BLD AUTO: 1.4 K/UL
MONOCYTES NFR BLD: 16 %
NEUTROPHILS # BLD AUTO: 6.4 K/UL
NEUTROPHILS NFR BLD: 71.9 %
OB PNL STL: POSITIVE
PLATELET # BLD AUTO: 209 K/UL
PMV BLD AUTO: 8.8 FL
POTASSIUM SERPL-SCNC: 5.4 MMOL/L
POTASSIUM SERPL-SCNC: 5.5 MMOL/L
PROT SERPL-MCNC: 6.7 G/DL
RBC # BLD AUTO: 3.83 M/UL
SODIUM SERPL-SCNC: 117 MMOL/L
SODIUM SERPL-SCNC: 117 MMOL/L
SODIUM SERPL-SCNC: 118 MMOL/L
SODIUM SERPL-SCNC: 119 MMOL/L
VANCOMYCIN SERPL-MCNC: 10 UG/ML
WBC # BLD AUTO: 8.9 K/UL

## 2017-07-21 PROCEDURE — 85014 HEMATOCRIT: CPT

## 2017-07-21 PROCEDURE — 80202 ASSAY OF VANCOMYCIN: CPT

## 2017-07-21 PROCEDURE — 82272 OCCULT BLD FECES 1-3 TESTS: CPT

## 2017-07-21 PROCEDURE — 25000003 PHARM REV CODE 250: Performed by: HOSPITALIST

## 2017-07-21 PROCEDURE — 36415 COLL VENOUS BLD VENIPUNCTURE: CPT

## 2017-07-21 PROCEDURE — 51798 US URINE CAPACITY MEASURE: CPT

## 2017-07-21 PROCEDURE — 11000001 HC ACUTE MED/SURG PRIVATE ROOM

## 2017-07-21 PROCEDURE — 85018 HEMOGLOBIN: CPT

## 2017-07-21 PROCEDURE — 85025 COMPLETE CBC W/AUTO DIFF WBC: CPT

## 2017-07-21 PROCEDURE — 80053 COMPREHEN METABOLIC PANEL: CPT

## 2017-07-21 PROCEDURE — 25000003 PHARM REV CODE 250: Performed by: EMERGENCY MEDICINE

## 2017-07-21 PROCEDURE — 94761 N-INVAS EAR/PLS OXIMETRY MLT: CPT

## 2017-07-21 PROCEDURE — 63600175 PHARM REV CODE 636 W HCPCS: Performed by: EMERGENCY MEDICINE

## 2017-07-21 PROCEDURE — 80048 BASIC METABOLIC PNL TOTAL CA: CPT

## 2017-07-21 RX ORDER — SODIUM CHLORIDE 9 MG/ML
INJECTION, SOLUTION INTRAVENOUS CONTINUOUS
Status: ACTIVE | OUTPATIENT
Start: 2017-07-21 | End: 2017-07-21

## 2017-07-21 RX ORDER — DABIGATRAN ETEXILATE 75 MG/1
75 CAPSULE ORAL 2 TIMES DAILY
Status: DISCONTINUED | OUTPATIENT
Start: 2017-07-21 | End: 2017-07-22

## 2017-07-21 RX ORDER — MIRTAZAPINE 15 MG/1
15 TABLET, FILM COATED ORAL NIGHTLY
Status: DISCONTINUED | OUTPATIENT
Start: 2017-07-21 | End: 2017-07-21

## 2017-07-21 RX ORDER — ACETAMINOPHEN 325 MG/1
650 TABLET ORAL EVERY 8 HOURS PRN
Status: DISCONTINUED | OUTPATIENT
Start: 2017-07-21 | End: 2017-08-02 | Stop reason: HOSPADM

## 2017-07-21 RX ORDER — SODIUM CHLORIDE 9 MG/ML
INJECTION, SOLUTION INTRAVENOUS CONTINUOUS
Status: ACTIVE | OUTPATIENT
Start: 2017-07-21 | End: 2017-07-22

## 2017-07-21 RX ORDER — ONDANSETRON 2 MG/ML
4 INJECTION INTRAMUSCULAR; INTRAVENOUS EVERY 12 HOURS PRN
Status: DISCONTINUED | OUTPATIENT
Start: 2017-07-21 | End: 2017-08-02 | Stop reason: HOSPADM

## 2017-07-21 RX ORDER — METOPROLOL TARTRATE 50 MG/1
50 TABLET ORAL 2 TIMES DAILY
Status: DISCONTINUED | OUTPATIENT
Start: 2017-07-21 | End: 2017-07-22

## 2017-07-21 RX ADMIN — DABIGATRAN ETEXILATE MESYLATE 75 MG: 75 CAPSULE ORAL at 08:07

## 2017-07-21 RX ADMIN — METOPROLOL TARTRATE 50 MG: 50 TABLET ORAL at 08:07

## 2017-07-21 RX ADMIN — SODIUM CHLORIDE: 0.9 INJECTION, SOLUTION INTRAVENOUS at 10:07

## 2017-07-21 RX ADMIN — DABIGATRAN ETEXILATE MESYLATE 75 MG: 75 CAPSULE ORAL at 10:07

## 2017-07-21 RX ADMIN — ONDANSETRON 4 MG: 2 INJECTION, SOLUTION INTRAMUSCULAR; INTRAVENOUS at 04:07

## 2017-07-21 RX ADMIN — METOPROLOL TARTRATE 50 MG: 50 TABLET ORAL at 10:07

## 2017-07-21 RX ADMIN — MIRTAZAPINE 15 MG: 15 TABLET, FILM COATED ORAL at 02:07

## 2017-07-21 RX ADMIN — CIPROFLOXACIN 400 MG: 2 INJECTION, SOLUTION INTRAVENOUS at 07:07

## 2017-07-21 RX ADMIN — SODIUM POLYSTYRENE SULFONATE 15 G: 15 SUSPENSION ORAL; RECTAL at 04:07

## 2017-07-21 RX ADMIN — CEFEPIME 1 G: 1 INJECTION, POWDER, FOR SOLUTION INTRAMUSCULAR; INTRAVENOUS at 04:07

## 2017-07-21 NOTE — CONSULTS
"Consult Note  Palliative Care      Consult Requested By: Tala Romeo MD  Reason for Consult:      SUBJECTIVE:     History of Present Illness:   Hallucinations       pt here with son who states seeing people who are not there, angels, and holes in floor. pt states pain all over body and just don't feel good      CC: Hallucinations     89 y.o.female with HTN, CAD, and depression presents to the ED in the care of her son who reports that the patient has been experiencing hallucinations since discharge from this hospital this past Saturday. The patient is seeing "angels and holes in the ground" and the patient is also speaking to "things that aren't there." She was evaluated at this hospital this past Saturday for urinary retention and discharged. Since then, her legs have been swelling as well. She endorses chronic back pain and knee pain. She has had posterior neck pain for the last month that is atraumatic in nature. Son reports that the patient was evaluated by Dr. Carmichael and was told that she has fluid in her lungs. Also, she went to the cardiologist this morning where she was assessed and advised to come to the ED to be evaluated for these hallucinations.      The history is provided by the patient and a relative.         Past Medical History:   Diagnosis Date    Coronary artery disease     Depression     ON WELLBUTRIN    Hypertension      History reviewed. No pertinent surgical history.  History reviewed. No pertinent family history.  Social History   Substance Use Topics    Smoking status: Former Smoker    Smokeless tobacco: Never Used    Alcohol use No       Mental Status:  Drowsy, oriented x 4, converses (intermittent confusion and intermittent auditory hallucinations during assessment), follows commands.    Palliative Performance Score:  Baseline 70; current 40+      OBJECTIVE:     Pain Assessment/symptom management:  Denies SOB.  Denies pain.  Reports she usually feels very good.  See MAR for " "medication regimen.  Recent visual and auditory hallucinations, confusion, decreased appetite      Decision-Making Capacity: Patient is able to express her wishes and preferences, family assists as needed.      Advanced Directives:  Living Will:  YES 17.  LaPOST completed today 17.  Do Not Resuscitate Status: DNR effective today per patient's expressed wishes.    Medical Power of : NO.        Living Arrangements: Patient was living alone, at home prior to admit.      Psychosocial, Spiritual, Cultural:       Protestant.     Patient's most important priorities: "being at home."      Patient's biggest concerns/fears: Denies concerns.        Previous death/end of life care history:   almost 20 years, they were  54 years, and she still counts anniversaries saying they would be  74 years if he was still alive.          Patient's goals/hopes:  Patient's goal is to "get better and go back home."        ASSESSMENT/PLAN:     Patient lying supine in bed, asleep, appears her stated age  Of 89 years.  She easily awakens to name call; oriented x 4, conversant (intermittent mild confusion and auditory hallucination - hearing songs - during assessment), follows commands.  She is calm, cooperative, and pleasant.  No acute distress.      Respiratory effort is even and unlabored, faint crackles.  Heart tones audible, irregular (a-fib on monitor), + murmur.  Abdomen soft, nondistended and nontender with hyperactive bowel sounds.  2-3+ edema lower legs ankles/feet.  Vital signs:  As charted.  IVF: NS @ 75 ml/h.      Patient is able to state some of current illness and why she is in the hospital. Family has good understanding of current illness.   She states she is able to live alone on her own, and family checks on her.  She performs all of her own ADL's, cooks, cleans, and son takes her to the grocery about every 2 weeks.  She does say "I'm a little bit slower now but I'm 89."  She states she " "has many things to enjoy in her life - she likes doing word games and coloring in a color book. She states her quality of life is good except past week or so "not doing as good."     We discussed goals of care, and patient states she wants to get better, and be able to go back home.  In later conversation with her daughter Teodora (lives in [place]) and daughter-in-law Heather (lives in [place]) , they voice concerns about patient going home.  Patient's son had been checking on her every day, but his wife has Alzheimer's and home demands are increasing, so he can only check on patient a few times a week.  Patient talks on phone with a niece and a good friend every day - that's partially how they realized she was not well, she had not called in a couple of days.  I also discussed with them, options for home health, AIM, and hospice.  They state patient has just started home health with [facility].  They say they want the patient to live with one of them and had always planned on that, but the patient is refusing.  Explained we will have additional meeting prior to discharge and SW will talk about safe discharge issues.  Perhaps patient would be willing to have a "long visit" with one of them, as she has done in the past.      We also discussed code status (as had already been discussed by Dr. Romeo).  Patient reaffirms she does NOT WANT CPR/RESUSCITATION.  She wants to have a peaceful and comfortable passing when her time comes.  Daughters are supportive of this decision.  We reviewed the LaPOST document in detail.  Patient also states she does not ever want a feeding tube.      Provided literature, "Hard Choices For [place] People" and fact sheets on cpr, nutrition and beneficial dehydration at the end of life.  Ample time was allowed for discussion of concerns and feelings.  All questions were asked and answered to their satisfaction.  Emotional support provided.      Plan:  Educate.  Literature.  Goals of care and " code status discussion.  Support.  Consult .      Recommendations:   Continue supportive care.   CHANGE CODE STATUS TO DNR (per Dr. Romeo).   LaPOST document.  (done, per Dr. Romeo)   ? Home health when discharged.   Likely will need SW assist with evaluation for safe discharge (pt lives alone)   Palliative Care will continue to follow as needed.    Thank you for this consult and the opportunity to participate in Mrs. Almaguer's care.      Time Spent:  60 minutes face to face exam and assessment, 30 minutes chart review and charting, team discussion.

## 2017-07-21 NOTE — ASSESSMENT & PLAN NOTE
Patient with worsening renal functions creatine 1.8. She is on lasix and lisinopril at home will hold both 2/2 dehydration - hydrate - she does not appear in FVO - give PRN hydrazine as needed for SBP>180 until renal functions improve  Repeat chemistry in am  IV fluids

## 2017-07-21 NOTE — PROGRESS NOTES
Pt  Care assumed, pt oriented to self  And current event such as the president ,month  And barbie michael date.pt also with auditory hallucination.pt states she hears angels singing. Pt re orientated to environment, pt wit LEJ  Iv site is clear. telemetry monitor in place with alarm.pt feet are cool and toes dusky pedal pulses are palpi table.pt bed alarm in use for safety.ptersonal items within reach.

## 2017-07-21 NOTE — ASSESSMENT & PLAN NOTE
Unclear etiology; according to nursing staff; this is a change from patient's usual mental status from last hospitalization - patient is on buspirone and Remeron that do not appear new unless she has not been taking them correctly - they were continued on discharge med list a week ago; afebrile without leukocytosis, urinalysis unrevealing dehydration??/dementia??delirum??medication reaction??  -cautious IV fluids at 75 cc/hr X 5 hours  -Continue IV ABX  -BC & Urine cultures pending  -treat dehydration/delirum?  -MRI head  -IP consult to Neurology

## 2017-07-21 NOTE — ASSESSMENT & PLAN NOTE
As stated in HPI her H/H is lower than 2 weeks ago Hgb from 14.1--->10.5; Urinalysis negative for hematuria; acute renal failure contributory???  Stool studies  Monitor Q12 H/H

## 2017-07-21 NOTE — PROGRESS NOTES
Shift-change report received from JUAN Nelson. Note that patient in bed with eyes closed, chest rise/fall. Will continue to assess.

## 2017-07-21 NOTE — PROGRESS NOTES
"Shift-change report given to JUAN Nelson. Patient in bed awake, alert and continues to experience visual hallucinations evidenced by patient stating, "don't you see those children over there?"  She denies pain at this time. Will continue to f/u.  "

## 2017-07-21 NOTE — ASSESSMENT & PLAN NOTE
Was here last week for urinary retension started on flomax - unknown if patient is voiding well or not - she is on bedrest  Strict I/O  Bladder scan now and every shift X 3  Awaiting urine cultures

## 2017-07-21 NOTE — PLAN OF CARE
Problem: Fall Risk (Adult)  Intervention: Reduce Risk/Promote Restraint Free Environment   17   Safety Interventions   Environmental Safety Modification clutter free environment maintained;lighting adjusted;room near unit station;mobility aid in reach;room organization consistent   Prevent  Drop/Fall   Safety/Security Measures bed alarm set     Intervention: Review Medications/Identify Contributors to Fall Risk   17   Safety Interventions   Medication Review/Management medications reviewed;high risk medications identified     Intervention: Patient Rounds   17 06   Safety Interventions   Patient Rounds bed in low position;bed wheels locked;call light in reach;clutter free environment maintained;ID band on;placement of personal items at bedside;toileting offered;visualized patient     Intervention: Safety Promotion/Fall Prevention   17   Safety Interventions   Safety Promotion/Fall Prevention Fall Risk reviewed with patient/family;commode/urinal/bedpan at bedside;bed alarm set;medications reviewed;nonskid shoes/socks when out of bed;side rails raised x 3         Comments: PT has been free of injury this shift. Pt has had minimal auditory and or visual hallucinations. Safety maintained with bed alarm and personal items within reach.

## 2017-07-21 NOTE — PROGRESS NOTES
Note that patient has returned from C/T scan awake, alert and continues to have auditory and visual hallucinations. Will give meds at this time and continue to f/u.

## 2017-07-21 NOTE — PLAN OF CARE
"SW met with patient family to complete discharge needs assessment. SW spoke to patient daughter Teodora and daughter-in-law Heather. Both parties expressed concern about patient going back to live on her own when discharged from the hospital, family feels patient need help however patient is refusing to go into a facility. SW advised family to try and have a discussion with patient and why they are concerned with her being home alone. SW provided patient with a "[facility] Packet", "Discharge Planning Begins on Admission" brochure and "Help At Home" handout. CLAUDIA discussed with family and patient the things patient will be responsible for to manage her health at home.            07/21/17 5502   Discharge Assessment   Assessment Type Discharge Planning Assessment   Confirmed/corrected address and phone number on facesheet? Yes   Assessment information obtained from? Other  (Teodora (dtr), Heather (dtr-in-law))   Type of Healthcare Directive Received (None)   Prior to hospitilization cognitive status: Alert/Oriented   Prior to hospitalization functional status: Needs Assistance   Current cognitive status: (Hallucinating)   Current Functional Status: Needs Assistance   Arrived From home or self-care   Lives With alone   Able to Return to Prior Arrangements unable to determine at this time (comments)   Is patient able to care for self after discharge? Unable to determine at this time (comments)   How many people do you have in your home that can help with your care after discharge? 0   Who are your caregiver(s) and their phone number(s)? Family reports patient berry Diego [phone] lives close by    Patient's perception of discharge disposition home or selfcare;home health   Readmission Within The Last 30 Days current reason for admission unrelated to previous admission   Patient currently being followed by outpatient case management? No   Patient currently receives home health services? Yes   Patient previously received " home health services and would like to resume services if necessary? Yes   If yes, name of home health provider: [facility]   Does the patient currently use HME? Yes   Name and contact number for HME provider: [facility]   Patient currently receives private duty nursing? No   Patient currently receives any other outside agency services? No   Equipment Currently Used at Home rollator;oxygen  (nebulizer)   Do you have any problems affording any of your prescribed medications? No   Is the patient taking medications as prescribed? (Patient is a poor historian)   Do you have any financial concerns preventing you from receiving the healthcare you need? No   Does the patient have transportation to healthcare appointments? Yes   Transportation Available family or friend will provide   On Dialysis? No   Does the patient receive services at the [facility]? No   Are there any open cases? No   Discharge Plan A Skilled Nursing Facility   Discharge Plan B Home;Home Health   Patient/Family In Agreement With Plan unable to assess

## 2017-07-21 NOTE — ASSESSMENT & PLAN NOTE
No evidence of FVO - moreover, likely over-diuresis - Last LVEF assessment was 65% + DD (7/2017) - on Pradaxa for afib - she was on lasix at home and spironolactone was held at discharge a week ago - will hold today and reassess tomorrow - cautious fluids for now for dehydration  Fluid restrictions  Strict I/O  Daily weight

## 2017-07-21 NOTE — H&P
"[facility] Medicine  History & Physical    Patient Name: Rachael Almaguer  MRN: [id number]  Admission Date: 7/20/2017  Attending Physician: Kaylin Manzano MD   Primary Care Provider: Omi Thomas MD         Patient information was obtained from patient and ER records.     Subjective:     Principal Problem:Visual hallucinations    Chief Complaint:   Chief Complaint   Patient presents with    Hallucinations     pt here with son who states seeing people who are not there, angels, and holes in floor. pt states pain all over body and just don't feel good        HPI: Rachael Almaguer is a 89 y.o. with medical history HTN, CAD, and depression. She presented for evaluation of 5 day history of hallucinations since discharge from this hospital on Saturday. Patient was admitted for urinary retension. History taken from patients chart and patient.    Per patient, who is gives good history except for the visual hallucinations, she had been seeing people who are not there. She tells me that her 10 year old granddaughter is in the chair in the room during history taking. However, she is able to tell me that on Monday she saw her PCP who told her "you don't look good". She also saw her cardiologist (Dr. Whiting) today who told her to come to the hospital. Additionally, patient reports BLLE swelling.    Patient is found to have significant drop in H/H since 2 weeks ago was 14.1/41.0 now 10.5/30.9; dehydration with BUN/CR/GFR 57/1.8/24 compared to Yonatan her renal functions were normal.            Past Medical History:   Diagnosis Date    Coronary artery disease     Depression     ON WELLBUTRIN    Hypertension        History reviewed. No pertinent surgical history.    Review of patient's allergies indicates:   Allergen Reactions    Aspirin     Xanax [alprazolam]        No current facility-administered medications on file prior to encounter.      Current Outpatient Prescriptions on File Prior to " "Encounter   Medication Sig    alendronate (FOSAMAX) 10 MG Tab Take 5 mg by mouth once daily.    busPIRone (BUSPAR) 10 MG tablet Take 10 mg by mouth 2 (two) times daily.     dabigatran etexilate (PRADAXA) 75 mg Cap Take 75 mg by mouth 2 (two) times daily. "Do NOT break, chew, or open capsules."    furosemide (LASIX) 20 MG tablet Take 1 tablet (20 mg total) by mouth 2 (two) times daily.    lisinopril 10 MG tablet Take 1 tablet (10 mg total) by mouth once daily.    metoprolol tartrate (LOPRESSOR) 50 MG tablet Take 50 mg by mouth 2 (two) times daily.    mirtazapine (REMERON) 15 MG tablet Take 15 mg by mouth every evening.    MV, MIN #36/IRON,CARBONYL/FA (GERITOL COMPLETE ORAL) Take by mouth once daily.     acetaminophen (TYLENOL) 325 MG tablet Take 325 mg by mouth Daily.     Family History     None        Social History Main Topics    Smoking status: Former Smoker    Smokeless tobacco: Never Used    Alcohol use No    Drug use: No    Sexual activity: Not Currently     Review of Systems   Constitutional: Negative for appetite change, chills, diaphoresis and fever.   HENT: Negative for congestion, hearing loss, sore throat, tinnitus and trouble swallowing.    Eyes: Negative for photophobia, discharge, itching and visual disturbance.   Respiratory: Negative for apnea, cough, wheezing and stridor.    Cardiovascular: Negative for chest pain, palpitations and leg swelling.   Gastrointestinal: Negative for abdominal distention, abdominal pain, blood in stool, constipation, diarrhea and nausea.   Endocrine: Negative for polydipsia, polyphagia and polyuria.   Genitourinary: Negative for difficulty urinating, dysuria, flank pain and frequency.   Musculoskeletal: Negative for arthralgias, joint swelling and neck stiffness.   Skin: Negative for color change, rash and wound.   Neurological: Negative for dizziness, tremors, seizures, light-headedness, numbness and headaches.   Hematological: Negative for adenopathy. "   Psychiatric/Behavioral: Positive for hallucinations. Negative for self-injury.     Objective:     Vital Signs (Most Recent):  Temp: 97.5 °F (36.4 °C) (07/20/17 1429)  Pulse: 76 (07/20/17 1632)  Resp: 15 (07/20/17 1632)  BP: (!) 150/56 (07/20/17 1632)  SpO2: 100 % (07/20/17 1632) Vital Signs (24h Range):  Temp:  [97.5 °F (36.4 °C)] 97.5 °F (36.4 °C)  Pulse:  [73-78] 76  Resp:  [15-18] 15  SpO2:  [97 %-100 %] 100 %  BP: (109-150)/(55-56) 150/56     Weight: 66.2 kg (146 lb)  Body mass index is 24.3 kg/m².    Physical Exam   Constitutional: She is oriented to person, place, and time. She appears well-developed and well-nourished. She is cooperative.   Small framed Body mass index is 24.29 kg/m².   HENT:   Head: Normocephalic and atraumatic.   Eyes: Conjunctivae, EOM and lids are normal. Pupils are equal, round, and reactive to light. Right eye exhibits no discharge. Left eye exhibits no discharge. No scleral icterus.   Neck: Normal range of motion and full passive range of motion without pain. Neck supple. No JVD present. No edema present. No thyroid mass present.   Cardiovascular: Normal rate, S1 normal, S2 normal and intact distal pulses.    No murmur heard.  Pulmonary/Chest: Effort normal.   Abdominal: Soft. Bowel sounds are normal. She exhibits no distension and no abdominal bruit. There is no splenomegaly or hepatomegaly. There is no tenderness. There is no CVA tenderness.   Musculoskeletal: She exhibits edema.   Non-pitting LE edema   Lymphadenopathy:     She has no cervical adenopathy.     She has no axillary adenopathy.   Neurological: She is alert and oriented to person, place, and time. She has normal reflexes. She displays no tremor. She displays no seizure activity.   Cooperative; Patient is strangely alert and oriented to self time place and situation but still seeing people who aren't there   Skin: Skin is warm, dry and intact.   Easy bruising   Psychiatric: She has a normal mood and affect. Her speech  is normal. Thought content normal. Cognition and memory are normal.        Significant Labs:   Blood Culture: No results for input(s): LABBLOO in the last 48 hours.  BMP:   Recent Labs  Lab 07/20/17  1230 07/20/17  1316   GLU 75  --      --    K 5.8*  --    CL 91*  --    CO2 15*  --    BUN 57*  --    CREATININE 1.8*  --    CALCIUM 9.1  --    MG  --  2.2     CBC:   Recent Labs  Lab 07/20/17  1250   WBC 8.60   HGB 10.5*   HCT 30.9*        Cardiac Markers: No results for input(s): CKMB, MYOGLOBIN, BNP, TROPISTAT in the last 48 hours.  Lactic Acid:   Recent Labs  Lab 07/20/17  1316   LACTATE 2.7*     Lipase: No results for input(s): LIPASE in the last 48 hours.  Lipid Panel: No results for input(s): CHOL, HDL, LDLCALC, TRIG, CHOLHDL in the last 48 hours.  Magnesium:   Recent Labs  Lab 07/20/17  1316   MG 2.2     Troponin:   Recent Labs  Lab 07/20/17  1230   TROPONINI 0.043*     TSH:   Recent Labs  Lab 07/20/17  1230   TSH 0.280*     Urine Culture: No results for input(s): LABURIN in the last 48 hours.    Significant Imaging:   Imaging Results          CT Head Without Contrast (Final result)  Result time 07/20/17 13:11:25    Final result by Teresa Taylor MD (07/20/17 13:11:25)                 Impression:         Noncontrast CT demonstrates no evidence of acute intracranial pathology.      Electronically signed by: TERESA TAYLOR MD  Date:     07/20/17  Time:    13:11              Narrative:    CT head without contrast    07/20/17 11:18:02    Accession# [id number]    CLINICAL INDICATION: 89 year old F with  hallucinations    TECHNIQUE: Axial CT images obtained throughout the region of the head without the use of intravenous contrast. Axial, sagittal and coronal reconstructions were performed.    COMPARISON: No priors.    FINDINGS:    Mild generalized cerebral volume loss, not unusually dense for age.No parenchymal mass, hemorrhage, edema or major vascular distribution infarct.    No extra-axial blood or  fluid collections.    The cranium appears intact. Mastoid air cells and paranasal sinuses are essentially clear.    Prior cataract surgery.                             X-Ray Chest AP Portable (Final result)  Result time 07/20/17 11:40:57    Final result by Maldonado Gonzáles MD (07/20/17 11:40:57)                 Impression:         Poorly visualized left hemidiaphragm, possible left basilar effusion, atelectasis, and/or infiltrate. Short-term PA and lateral chest followup could be performed to ensure stability/resolution.       Electronically signed by: Maldonado Gonzáles MD  Date:     07/20/17  Time:    11:40              Narrative:    AP Portable Chest    Comparison: none.     Findings:  The cardiomediastinal contour is within normal limits.   Left lung base/ hemidiaphragm not well visualized.  No gross pleural effusions or pneumothorax identified.  Prior right shoulder arthroplasty.                                Assessment/Plan:     * Visual hallucinations    Unclear etiology; according to nursing staff; this is a change from patient's usual mental status from last hospitalization - patient is on buspirone and Remeron that do not appear new unless she has not been taking them correctly - they were continued on discharge med list a week ago; afebrile without leukocytosis, urinalysis unrevealing dehydration??/dementia??delirum??medication reaction??  -cautious IV fluids at 75 cc/hr X 5 hours  -Continue IV ABX  -BC & Urine cultures pending  -treat dehydration/delirum?  -MRI head  -IP consult to Neurology          Acute renal failure superimposed on stage 3 chronic kidney disease    Patient with worsening renal functions creatine 1.8. She is on lasix and lisinopril at home will hold both 2/2 dehydration - hydrate - she does not appear in FVO - give PRN hydrazine as needed for SBP>180 until renal functions improve  Repeat chemistry in am  IV fluids          Anemia    As stated in HPI her H/H is lower than 2 weeks ago Hgb from  14.1--->10.5; Urinalysis negative for hematuria; acute renal failure contributory???  Stool studies  Monitor Q12 H/H          Urinary retention    Was here last week for urinary retension started on flomax - unknown if patient is voiding well or not - she is on bedrest  Strict I/O  Bladder scan now and every shift X 3  Awaiting urine cultures          Chronic diastolic congestive heart failure    No evidence of FVO - moreover, likely over-diuresis - Last LVEF assessment was 65% + DD (7/2017) - on Pradaxa for afib - she was on lasix at home and spironolactone was held at discharge a week ago - will hold today and reassess tomorrow - cautious fluids for now for dehydration  Fluid restrictions  Strict I/O  Daily weight            VTE Risk Mitigation     None            LISA Orellana, FNP-C  PA# [id number]  NPI# [id number]  Hospitalist - Department of Hospital Medicine  [address] [place] [address]  Office [phone]; Pager [phone]

## 2017-07-21 NOTE — PROGRESS NOTES
Note that Joana from the lab called with a critical sodium result of 117. Dr. Romeo called and informed of result. Will continue to f/u.

## 2017-07-22 ENCOUNTER — ANESTHESIA (OUTPATIENT)
Dept: ENDOSCOPY | Facility: HOSPITAL | Age: 82
DRG: 070 | End: 2017-07-22
Payer: MEDICARE

## 2017-07-22 ENCOUNTER — SURGERY (OUTPATIENT)
Age: 82
End: 2017-07-22

## 2017-07-22 ENCOUNTER — ANESTHESIA EVENT (OUTPATIENT)
Dept: ENDOSCOPY | Facility: HOSPITAL | Age: 82
DRG: 070 | End: 2017-07-22
Payer: MEDICARE

## 2017-07-22 PROBLEM — D62 ACUTE BLOOD LOSS ANEMIA: Status: ACTIVE | Noted: 2017-07-20

## 2017-07-22 PROBLEM — K92.2 GIB (GASTROINTESTINAL BLEEDING): Status: ACTIVE | Noted: 2017-07-22

## 2017-07-22 LAB
ABO + RH BLD: NORMAL
ANION GAP SERPL CALC-SCNC: 3 MMOL/L
ANION GAP SERPL CALC-SCNC: 8 MMOL/L
BACTERIA UR CULT: NO GROWTH
BASOPHILS # BLD AUTO: 0.01 K/UL
BASOPHILS NFR BLD: 0.1 %
BLD GP AB SCN CELLS X3 SERPL QL: NORMAL
BLD PROD TYP BPU: NORMAL
BLOOD UNIT EXPIRATION DATE: NORMAL
BLOOD UNIT TYPE CODE: 5100
BLOOD UNIT TYPE: NORMAL
BUN SERPL-MCNC: 60 MG/DL
BUN SERPL-MCNC: 62 MG/DL
CALCIUM SERPL-MCNC: 7.4 MG/DL
CALCIUM SERPL-MCNC: 8 MG/DL
CHLORIDE SERPL-SCNC: 101 MMOL/L
CHLORIDE SERPL-SCNC: 95 MMOL/L
CO2 SERPL-SCNC: 20 MMOL/L
CO2 SERPL-SCNC: 24 MMOL/L
CODING SYSTEM: NORMAL
CREAT SERPL-MCNC: 0.9 MG/DL
CREAT SERPL-MCNC: 1.2 MG/DL
DIFFERENTIAL METHOD: ABNORMAL
DISPENSE STATUS: NORMAL
EOSINOPHIL # BLD AUTO: 0 K/UL
EOSINOPHIL NFR BLD: 0.4 %
ERYTHROCYTE [DISTWIDTH] IN BLOOD BY AUTOMATED COUNT: 13.7 %
EST. GFR  (AFRICAN AMERICAN): 46 ML/MIN/1.73 M^2
EST. GFR  (AFRICAN AMERICAN): >60 ML/MIN/1.73 M^2
EST. GFR  (NON AFRICAN AMERICAN): 40 ML/MIN/1.73 M^2
EST. GFR  (NON AFRICAN AMERICAN): 56 ML/MIN/1.73 M^2
GLUCOSE SERPL-MCNC: 80 MG/DL
GLUCOSE SERPL-MCNC: 84 MG/DL
HCT VFR BLD AUTO: 24.3 %
HCT VFR BLD AUTO: 28.8 %
HCT VFR BLD AUTO: 31.4 %
HGB BLD-MCNC: 10.9 G/DL
HGB BLD-MCNC: 8.4 G/DL
HGB BLD-MCNC: 9.8 G/DL
LYMPHOCYTES # BLD AUTO: 1.3 K/UL
LYMPHOCYTES NFR BLD: 15.6 %
MAGNESIUM SERPL-MCNC: 1.5 MG/DL
MCH RBC QN AUTO: 34 PG
MCHC RBC AUTO-ENTMCNC: 34.7 G/DL
MCV RBC AUTO: 98 FL
MONOCYTES # BLD AUTO: 1.4 K/UL
MONOCYTES NFR BLD: 16.3 %
NEUTROPHILS # BLD AUTO: 5.7 K/UL
NEUTROPHILS NFR BLD: 67.6 %
PHOSPHATE SERPL-MCNC: 2.1 MG/DL
PLATELET # BLD AUTO: 216 K/UL
PMV BLD AUTO: 8.7 FL
POCT GLUCOSE: 113 MG/DL (ref 70–110)
POTASSIUM SERPL-SCNC: 5.1 MMOL/L
POTASSIUM SERPL-SCNC: 5.2 MMOL/L
RBC # BLD AUTO: 3.21 M/UL
SODIUM SERPL-SCNC: 123 MMOL/L
SODIUM SERPL-SCNC: 128 MMOL/L
TRANS ERYTHROCYTES VOL PATIENT: NORMAL ML
WBC # BLD AUTO: 8.39 K/UL

## 2017-07-22 PROCEDURE — 80048 BASIC METABOLIC PNL TOTAL CA: CPT | Mod: 91

## 2017-07-22 PROCEDURE — 37000009 HC ANESTHESIA EA ADD 15 MINS: Performed by: INTERNAL MEDICINE

## 2017-07-22 PROCEDURE — 63600175 PHARM REV CODE 636 W HCPCS: Performed by: INTERNAL MEDICINE

## 2017-07-22 PROCEDURE — 25000003 PHARM REV CODE 250: Performed by: INTERNAL MEDICINE

## 2017-07-22 PROCEDURE — C9399 UNCLASSIFIED DRUGS OR BIOLOG: HCPCS | Performed by: HOSPITALIST

## 2017-07-22 PROCEDURE — 80048 BASIC METABOLIC PNL TOTAL CA: CPT

## 2017-07-22 PROCEDURE — 85025 COMPLETE CBC W/AUTO DIFF WBC: CPT

## 2017-07-22 PROCEDURE — 83735 ASSAY OF MAGNESIUM: CPT

## 2017-07-22 PROCEDURE — 84100 ASSAY OF PHOSPHORUS: CPT

## 2017-07-22 PROCEDURE — 99222 1ST HOSP IP/OBS MODERATE 55: CPT | Mod: ,,, | Performed by: PSYCHIATRY & NEUROLOGY

## 2017-07-22 PROCEDURE — 37000008 HC ANESTHESIA 1ST 15 MINUTES: Performed by: INTERNAL MEDICINE

## 2017-07-22 PROCEDURE — 63600175 PHARM REV CODE 636 W HCPCS: Performed by: HOSPITALIST

## 2017-07-22 PROCEDURE — 85018 HEMOGLOBIN: CPT | Mod: 91

## 2017-07-22 PROCEDURE — D9220A PRA ANESTHESIA: Mod: ANES,,, | Performed by: ANESTHESIOLOGY

## 2017-07-22 PROCEDURE — 86920 COMPATIBILITY TEST SPIN: CPT

## 2017-07-22 PROCEDURE — 25000003 PHARM REV CODE 250: Performed by: REGISTERED NURSE

## 2017-07-22 PROCEDURE — 25000003 PHARM REV CODE 250: Performed by: HOSPITALIST

## 2017-07-22 PROCEDURE — 86901 BLOOD TYPING SEROLOGIC RH(D): CPT

## 2017-07-22 PROCEDURE — 94761 N-INVAS EAR/PLS OXIMETRY MLT: CPT

## 2017-07-22 PROCEDURE — 25000003 PHARM REV CODE 250: Performed by: EMERGENCY MEDICINE

## 2017-07-22 PROCEDURE — 85014 HEMATOCRIT: CPT

## 2017-07-22 PROCEDURE — 86900 BLOOD TYPING SEROLOGIC ABO: CPT

## 2017-07-22 PROCEDURE — P9021 RED BLOOD CELLS UNIT: HCPCS

## 2017-07-22 PROCEDURE — 36430 TRANSFUSION BLD/BLD COMPNT: CPT

## 2017-07-22 PROCEDURE — 63600175 PHARM REV CODE 636 W HCPCS: Performed by: EMERGENCY MEDICINE

## 2017-07-22 PROCEDURE — 20000000 HC ICU ROOM

## 2017-07-22 PROCEDURE — 0DJ08ZZ INSPECTION OF UPPER INTESTINAL TRACT, VIA NATURAL OR ARTIFICIAL OPENING ENDOSCOPIC: ICD-10-PCS | Performed by: INTERNAL MEDICINE

## 2017-07-22 PROCEDURE — 36415 COLL VENOUS BLD VENIPUNCTURE: CPT

## 2017-07-22 PROCEDURE — C9113 INJ PANTOPRAZOLE SODIUM, VIA: HCPCS | Performed by: INTERNAL MEDICINE

## 2017-07-22 PROCEDURE — D9220A PRA ANESTHESIA: Mod: CRNA,,, | Performed by: REGISTERED NURSE

## 2017-07-22 PROCEDURE — 43235 EGD DIAGNOSTIC BRUSH WASH: CPT | Performed by: INTERNAL MEDICINE

## 2017-07-22 PROCEDURE — 63600175 PHARM REV CODE 636 W HCPCS: Performed by: REGISTERED NURSE

## 2017-07-22 RX ORDER — METOCLOPRAMIDE HYDROCHLORIDE 5 MG/ML
10 INJECTION INTRAMUSCULAR; INTRAVENOUS ONCE
Status: COMPLETED | OUTPATIENT
Start: 2017-07-22 | End: 2017-07-22

## 2017-07-22 RX ORDER — HYDROCODONE BITARTRATE AND ACETAMINOPHEN 500; 5 MG/1; MG/1
TABLET ORAL
Status: DISCONTINUED | OUTPATIENT
Start: 2017-07-22 | End: 2017-07-24

## 2017-07-22 RX ORDER — METOPROLOL TARTRATE 1 MG/ML
5 INJECTION, SOLUTION INTRAVENOUS EVERY 6 HOURS PRN
Status: DISCONTINUED | OUTPATIENT
Start: 2017-07-22 | End: 2017-07-24

## 2017-07-22 RX ORDER — LIDOCAINE HYDROCHLORIDE 20 MG/ML
INJECTION, SOLUTION EPIDURAL; INFILTRATION; INTRACAUDAL; PERINEURAL
Status: DISPENSED
Start: 2017-07-22 | End: 2017-07-22

## 2017-07-22 RX ORDER — SODIUM CHLORIDE 9 MG/ML
INJECTION, SOLUTION INTRAVENOUS CONTINUOUS
Status: DISCONTINUED | OUTPATIENT
Start: 2017-07-22 | End: 2017-07-23

## 2017-07-22 RX ORDER — SODIUM CHLORIDE 9 MG/ML
INJECTION, SOLUTION INTRAVENOUS CONTINUOUS PRN
Status: DISCONTINUED | OUTPATIENT
Start: 2017-07-22 | End: 2017-07-22

## 2017-07-22 RX ORDER — PHENYLEPHRINE HCL IN 0.9% NACL 1 MG/10 ML
SYRINGE (ML) INTRAVENOUS
Status: DISCONTINUED
Start: 2017-07-22 | End: 2017-07-22 | Stop reason: WASHOUT

## 2017-07-22 RX ORDER — PANTOPRAZOLE SODIUM 40 MG/10ML
40 INJECTION, POWDER, LYOPHILIZED, FOR SOLUTION INTRAVENOUS DAILY
Status: DISCONTINUED | OUTPATIENT
Start: 2017-07-22 | End: 2017-07-22

## 2017-07-22 RX ORDER — LIDOCAINE HCL/PF 100 MG/5ML
SYRINGE (ML) INTRAVENOUS
Status: DISCONTINUED | OUTPATIENT
Start: 2017-07-22 | End: 2017-07-22

## 2017-07-22 RX ORDER — ETOMIDATE 2 MG/ML
INJECTION INTRAVENOUS
Status: DISCONTINUED | OUTPATIENT
Start: 2017-07-22 | End: 2017-07-22

## 2017-07-22 RX ORDER — ETOMIDATE 2 MG/ML
INJECTION INTRAVENOUS
Status: DISPENSED
Start: 2017-07-22 | End: 2017-07-22

## 2017-07-22 RX ADMIN — CEFEPIME 1 G: 1 INJECTION, POWDER, FOR SOLUTION INTRAMUSCULAR; INTRAVENOUS at 03:07

## 2017-07-22 RX ADMIN — METOCLOPRAMIDE 10 MG: 5 INJECTION, SOLUTION INTRAMUSCULAR; INTRAVENOUS at 12:07

## 2017-07-22 RX ADMIN — ETOMIDATE 4 MG: 2 INJECTION, SOLUTION INTRAVENOUS at 09:07

## 2017-07-22 RX ADMIN — SODIUM POLYSTYRENE SULFONATE 15 G: 15 SUSPENSION ORAL; RECTAL at 12:07

## 2017-07-22 RX ADMIN — IDARUCIZUMAB 2.5 G: 50 INJECTION INTRAVENOUS at 12:07

## 2017-07-22 RX ADMIN — DEXTROSE 8 MG/HR: 50 INJECTION, SOLUTION INTRAVENOUS at 03:07

## 2017-07-22 RX ADMIN — SODIUM CHLORIDE: 0.9 INJECTION, SOLUTION INTRAVENOUS at 09:07

## 2017-07-22 RX ADMIN — LIDOCAINE HYDROCHLORIDE 100 MG: 20 INJECTION, SOLUTION INTRAVENOUS at 09:07

## 2017-07-22 RX ADMIN — SODIUM CHLORIDE: 0.9 INJECTION, SOLUTION INTRAVENOUS at 07:07

## 2017-07-22 RX ADMIN — DEXTROSE 8 MG/HR: 50 INJECTION, SOLUTION INTRAVENOUS at 08:07

## 2017-07-22 RX ADMIN — ETOMIDATE 6 MG: 2 INJECTION, SOLUTION INTRAVENOUS at 09:07

## 2017-07-22 NOTE — TRANSFER OF CARE
"Anesthesia Transfer of Care Note    Patient: Rachael Almaguer    Procedure(s) Performed: Procedure(s) (LRB):  ESOPHAGOGASTRODUODENOSCOPY (EGD) (N/A)    Patient location: GI    Anesthesia Type: MAC    Transport from OR: Transported from OR on 2-3 L/min O2 by NC with adequate spontaneous ventilation    Post pain: adequate analgesia    Post assessment: tolerated procedure well and no apparent anesthetic complications    Post vital signs: stable    Level of consciousness: awake and alert    Nausea/Vomiting: no nausea/vomiting    Complications: none    Transfer of care protocol was followed      Last vitals:   Visit Vitals  BP (!) 160/68   Pulse 97   Temp 37.2 °C (99 °F) (Oral)   Resp 14   Ht 5' 5" (1.651 m)   Wt 67.9 kg (149 lb 11.2 oz)   SpO2 100%   Breastfeeding? No   BMI 24.91 kg/m²     "

## 2017-07-22 NOTE — ASSESSMENT & PLAN NOTE
Pt was here last week for urinary retention and started on flomax. Bladder scan and straight cath if needed.

## 2017-07-22 NOTE — PLAN OF CARE
Problem: Patient Care Overview  Goal: Plan of Care Review  Outcome: Ongoing (interventions implemented as appropriate)  Patient continues to have loose dark red bowel movement. Dr. Aguilar is aware. Serial H/H are being done every 6 hours with GI notification for hematocrit <27. She remains in rate controlled atrial fibrillation. Pradaxa was discontinued and reversal was given as indicated. She remains NPO but maintenance fluids are ordered. She has been difficult to initiate peripheral access so a consult for bedside PICC was ordered. Telephone consent was given by daughter in law Heather Almaguer after no answer from sons  or Se. She sustained no injury falls or trauma this shift.

## 2017-07-22 NOTE — PROGRESS NOTES
Notified  that patient's stool occult blood came back positive and that the patient had 2 liquid black tarry stools. New orders given for a GI consult.

## 2017-07-22 NOTE — ASSESSMENT & PLAN NOTE
As stated in HPI her H/H is lower than 2 weeks ago Hgb from 14.1--->10.5; urinalysis negative for hematuria; stool studies and monitor with H/H.

## 2017-07-22 NOTE — ASSESSMENT & PLAN NOTE
Her H/H is lower than 2 weeks ago Hgb from 14.1--->10.5 and with reason now obvious with GIB, stool studies + and with feliciano melena and blood seen on EGD; monitor with H/H and transfuse if necessary.

## 2017-07-22 NOTE — OR NURSING
Tolerated recovery without any complications noted. Transfer criteria met. Will be transporting patient by stretcher to ICU room 262. Will give bedside report to ICU nurse.

## 2017-07-22 NOTE — HOSPITAL COURSE
Ms. Almaguer was admitted with AMS of uncertain etiology in the beginning, but quickly became clear after repeat BMP showed Na was 117 causing metabolic encephalopathy. Pt had negative head CT and further workup with MRI was unremarkable. Neurology following. Pt with hypovolemia causing hyponatremia and all diuretics stopped and steady correction with NS in progress with close monitoring of electrolytes. Patient was transferred to the ICU on 7/22 for acute GI bleed.  GI was consulted and took the patient to EGD the same day.  Blood was found in the entire stomach.  The patient received multiple units of blood. She continued to bleed and the patient was taken back to EGD on 7/23. This showed non-bleeding esophageal ulcer as well as a non-bleeding gastric ulcer with cautery and clips placed.  GI recommended continued ICU monitoring.  Patient is DNR. Patient received blood again on 7/23. She received a total of 4 units of blood. The patient was very agitated on 7/23 and Zyprexa was started. The patient's H/H stabilized since 7/23 and the patient was sent to the floor on 7/24. No further evidence of GI bleed since. Speech, PT/OT were consulted on 7/24. Has declined significantly and at times is unwilling to participate as she feels exhausted. Speech recommended pureed with nectar thickened liquids, but unfortunately patient continues to show signs of aspiration as she tends to cough after meals. An MBBS was attempted on 7/27 but patient adamantly refused. Metoprolol increased 50 mg BID for better control of AFib. Anticoagulation on hold indefinitely. Patient and family aware that AFib increases risk for stroke when not on anticoagulation. Verbalized understanding. Also third-spacing and evidently with pulmonary edema. Trial of IV lasix but will need to continue very gently hydration with D5W as PO intake is extremely limited and is at risk for hypoglycemia. Delirium and visual hallucinations continue to be an issue.  Barely sleeps at night despite efforts. Psych consulted for recs on this matter. I am concerned that patient will not achieve meaningful recovery in order to transfer to a SNF. Is evidently malnourished, is third spacing, has oropharyngeal dysphagia on precautions, with signs of aspiration despite these precautions, gets exhausted very easily and has expressed that she just wants to lay in bed. Declined MBBS and has expressed not wanting to proceed with PT/OT. Is no interested in nursing facility but rather adamant on going home. Lives alone and was fairly independent up until a month ago or so (per daughter and daughter in law). Son is only one who lives close. Is DNR and is hospice appropriate. Palliative care on board. Meeting with patient's three sons and daughter held on 7/28 along with palliative care and social work. Long discussion about patient's current clinical status and recommendation for hospice services. Overall, they all expressed agreement with hospice as best next step but only one son lives in [place] and is unable to provide 24-hour care for patient at home. Other two sons and daughter expressed difficulty meeting such demand as well. They also expressed not able to afford a private sitter.will plan with family for Hospice placement.had  family meeting today.every body agree with Inpatient Hospice.

## 2017-07-22 NOTE — ASSESSMENT & PLAN NOTE
Unclear etiology at first given Na level was normal, but repeat level markedly different with hyponatremia of 117 and repeat confirmed true value. Workup with head CT and MRI negative, non-focal exam. Recent admit for urinary retention made infectious process a consideration but all cultures are negative. Will begin de-escalation with d/c of Vancomycin and Cipro today. Continue bladder scans and slow correction of Na level, no greater than 0.5 mEq/hr, and close monitoring with labs. Appreciate Neurology input.

## 2017-07-22 NOTE — OR NURSING
Went to patient room 306, placed patient on stretcher with assistance from Homa and MARY. Patient transported to endoscopy dept with son, as well as daughter in law @ bedside. Patient very pleasant to care for.

## 2017-07-22 NOTE — ASSESSMENT & PLAN NOTE
Patient with worsening renal function with creatine 1.8 due to prerenal etiology, improving with IVF and holding lasix and lisinopril; continue close monitoring.

## 2017-07-22 NOTE — PLAN OF CARE
Problem: Fall Risk (Adult)  Goal: Identify Related Risk Factors and Signs and Symptoms  Related risk factors and signs and symptoms are identified upon initiation of [employer] Clinical Practice Guideline (CPG)    07/21/17 2048   Fall Risk   Related Risk Factors (Fall Risk) age-related changes;confusion/agitation;gait/mobility problems;history of falls;polypharmacy;sensory deficits;environment unfamiliar       Comments: Note that patient and family encouraged to call for assistance when patient up to Br. They called when patient had the urge for Br.

## 2017-07-22 NOTE — PROGRESS NOTES
Shift-change report given to JUAN Rasheed. Note that patient in bed, awake, alert and continues to have auditory and visual hallucinations. Patient denied nausea after given Zofran. Will continue to f/u.

## 2017-07-22 NOTE — ASSESSMENT & PLAN NOTE
Pt with feliciano melena this morning and GI consulted s/p EGD with extensive amount of blood noted in the stomach with poor visualization. Pt placed on PPI gtt, transferred to ICU for closer monitoring, and s/p Praxbind for Pradaxa reversal. Notified IV access poor, will consult for PICC line now and continue CBC surveillance and transfuse if necessary.

## 2017-07-22 NOTE — PROGRESS NOTES
"[facility] Medicine  Progress Note    Patient Name: Rachael Almaguer  MRN: [id number]  Patient Class: IP- Inpatient   Admission Date: 7/20/2017  Length of Stay: 2 days  Attending Physician: Tala Romeo MD  Primary Care Provider: Omi Thomas MD        Subjective:     Principal Problem:Metabolic encephalopathy    HPI:  Rachael Almaguer is a 89 y.o. with medical history HTN, CAD, and depression. She presented for evaluation of 5 day history of hallucinations since discharge from this hospital on Saturday. Patient was admitted for urinary retension. History taken from patients chart and patient.    Per patient, who surprisingly gives good history except for the visual hallucinations, she had been seeing people who are not there. She tells me that her 10 year old granddaughter is in the chair in the room during history taking. However, she is able to tell me that on Monday she saw her PCP who told her "you don't look good". She also saw her cardiologist (Dr. Whiting) today who told her to come to the hospital. Additionally, patient reports BLLE swelling.    Patient is found to have significant drop in H/H since 2 weeks ago was 14.1/41.0 now 10.5/30.9; dehydration with BUN/CR/GFR 57/1.8/24 compared to Jan her renal functions were normal.      Hospital Course:  Ms. Almaguer was admitted with AMS of uncertain etiology in the beginning, but quickly became clear after repeat BMP showed Na was 117 causing metabolic encephalopathy. Pt had negative head CT and further workup with MRI was unremarkable. Neurology following. Pt with hypovolemia causing hyponatremia and all diuretics stopped and steady correction with NS in progress with close monitoring of electrolytes.     Interval History: Pt had return of occult blood + studies, then immediately had 2 melanotic stools seen by nurse this morning just after 6am. Pt was taken to the endoscopy s/p EGD with large amount of blood and food in the stomach " and duodenal bulb.  Mucosa could not be examined well. Pt transferred to ICU and Praxbind given. Pt's vitals are currently stable in the ICU.    Review of Systems   Constitutional: Negative for chills and fever.   Respiratory: Negative for shortness of breath.    Cardiovascular: Negative for chest pain.   Gastrointestinal: Positive for blood in stool.     Objective:     Vital Signs (Most Recent):  Temp: 98.5 °F (36.9 °C) (07/22/17 1025)  Pulse: 100 (07/22/17 1400)  Resp: 14 (07/22/17 1400)  BP: (!) 104/54 (07/22/17 1400)  SpO2: 100 % (07/22/17 1400) Vital Signs (24h Range):  Temp:  [97.6 °F (36.4 °C)-99 °F (37.2 °C)] 98.5 °F (36.9 °C)  Pulse:  [] 100  Resp:  [10-33] 14  SpO2:  [90 %-100 %] 100 %  BP: (104-160)/(54-68) 104/54     Weight: 67.9 kg (149 lb 11.2 oz)  Body mass index is 24.91 kg/m².    Intake/Output Summary (Last 24 hours) at 07/22/17 1648  Last data filed at 07/22/17 0957   Gross per 24 hour   Intake              400 ml   Output              300 ml   Net              100 ml      Physical Exam   Constitutional: She appears well-developed.   Thin and frail.   HENT:   Head: Normocephalic.   +temporal wasting, hard of hearing.   Eyes: EOM are normal. Pupils are equal, round, and reactive to light.   Neck: Normal range of motion. Neck supple.   Cardiovascular: Normal rate and regular rhythm.    Pulmonary/Chest: Effort normal and breath sounds normal.   Abdominal: Soft. She exhibits distension.   Musculoskeletal: Normal range of motion. She exhibits edema.   Neurological:   Slow to answer today, but aware of hospitalization. Still groggy from EGD anesthesia.   Skin: Skin is warm and dry.   Psychiatric: Her behavior is normal. Thought content normal.   Unclear if she is still having auditory hallucinations.       Significant Labs: All pertinent labs within the past 24 hours have been reviewed.    Significant Imaging: I have reviewed and interpreted all pertinent imaging results/findings within the past 24  hours.    Assessment/Plan:      * Metabolic encephalopathy    Unclear etiology at first given Na level was normal, but repeat level markedly different with hyponatremia of 117 and repeat confirmed to be true value. Workup with head CT and MRI negative, non-focal exam. Recent admit for urinary retention made infectious process a consideration but all cultures are negative. De-escalation with d/c of Vancomycin and Cipro antibiotics done yesterday, but will keep cefepime on for now given bleeding issues. Continue bladder scans and slow correction of Na level, no greater than 0.5 mEq/hr, and close monitoring with labs. Appreciate Neurology input.        Hyponatremia    As discussed above. Correcting with close monitoring.        GIB (gastrointestinal bleeding)    Pt with feliciano melena this morning and GI consulted s/p EGD with extensive amount of blood noted in the stomach with poor visualization. Pt placed on PPI gtt, transferred to ICU for closer monitoring, and s/p Praxbind for Pradaxa reversal. Notified IV access poor, will consult for PICC line now and continue CBC surveillance and transfuse if necessary.        Acute blood loss anemia    Her H/H is lower than 2 weeks ago Hgb from 14.1--->10.5 and with reason now obvious with GIB, stool studies + and with feliciano melena and blood seen on EGD; monitor with H/H and transfuse if necessary.        DNR (do not resuscitate)    Reviewed with patient and family, she is DNR. Palliative care consulted for counseling and LaPOST filled out.        Acute renal failure superimposed on stage 3 chronic kidney disease    Patient with worsening renal function with creatine 1.8 due to prerenal etiology, improving with IVF and holding lasix and lisinopril; continue close monitoring.        Urinary retention    Pt was here last week for urinary retention and started on flomax. Bladder scan and straight cath if needed.        Chronic diastolic congestive heart failure    No evidence of  decompensation and with likely over-diuresis just prior to admission. Continue to hold diuretics and judicious IVF as discussed above.          VTE Risk Mitigation         Ordered     Medium Risk of VTE  Once      07/21/17 0149     Reason for No Pharmacological VTE Prophylaxis  Once      07/21/17 0149          Critical care time spent: 60 minutes.    Tala Romeo MD  Department of Hospital Medicine   [facility]

## 2017-07-22 NOTE — ASSESSMENT & PLAN NOTE
Reviewed with patient and family, she is DNR. Palliative care consulted for counseling and LaPOST filled out.

## 2017-07-22 NOTE — ASSESSMENT & PLAN NOTE
Patient with worsening renal functions creatine 1.8. She is on lasix and lisinopril at home will hold, IVF and close monitoring.

## 2017-07-22 NOTE — ASSESSMENT & PLAN NOTE
No evidence of decompensation and with likely over-diuresis just prior to admission. Continue to hold diuretics and judicious IVF as discussed above.

## 2017-07-22 NOTE — PROGRESS NOTES
EGD done and report in provation.    Patient has large amount of blood and food in the stomach and duodenal bulb.  Mucosa could not be examined well    Plans: Transfer to ICU and monitor H & H and continue with protonix drip

## 2017-07-22 NOTE — CONSULTS
"[facility]  Neurology  Consult Note    Patient Name: Rachael Almaguer  MRN: [id number]  Admission Date: 7/20/2017  Hospital Length of Stay: 2 days  Code Status: DNR   Attending Provider: Omi Reyes MD   Consulting Provider: Umair Gomez MD  Primary Care Physician: Omi Reyes MD  Principal Problem:Metabolic encephalopathy    Consults  Subjective:     Chief Complaint: Hallucinations    HPI: 89 y/o female with medical Hx as listed below present to ED for hallucinations. Pt  had been seeing things for 5 days. She is aware of hallucinations not real. No weakness, numbness, vertigo, speech disturbances. She was found to be hyponatremic. Her hospitalization as been complicated with urinary retention, and melena. EGD was performed today showing blood in stomach. Praxbind administered as pt takes dabigatran due to a-fib.    Past Medical History:   Diagnosis Date    Coronary artery disease     Depression     ON WELLBUTRIN    Hypertension        History reviewed. No pertinent surgical history.    Review of patient's allergies indicates:   Allergen Reactions    Aspirin     Xanax [alprazolam]        Current Neurological Medications:    No current facility-administered medications on file prior to encounter.      Current Outpatient Prescriptions on File Prior to Encounter   Medication Sig    alendronate (FOSAMAX) 10 MG Tab Take 5 mg by mouth once daily.    busPIRone (BUSPAR) 10 MG tablet Take 10 mg by mouth 2 (two) times daily.     dabigatran etexilate (PRADAXA) 75 mg Cap Take 75 mg by mouth 2 (two) times daily. "Do NOT break, chew, or open capsules."    furosemide (LASIX) 20 MG tablet Take 1 tablet (20 mg total) by mouth 2 (two) times daily.    lisinopril 10 MG tablet Take 1 tablet (10 mg total) by mouth once daily.    metoprolol tartrate (LOPRESSOR) 50 MG tablet Take 50 mg by mouth 2 (two) times daily.    mirtazapine (REMERON) 15 MG tablet Take 15 mg by mouth every evening.    MV, " MIN #36/IRON,CARBONYL/FA (GERITOL COMPLETE ORAL) Take by mouth once daily.     acetaminophen (TYLENOL) 325 MG tablet Take 325 mg by mouth Daily.      Family History     None        Social History Main Topics    Smoking status: Former Smoker    Smokeless tobacco: Never Used    Alcohol use No    Drug use: No    Sexual activity: Not Currently     Review of Systems   Constitutional: Negative for fever.   HENT: Negative for trouble swallowing.    Respiratory: Negative for chest tightness.    Cardiovascular: Negative for chest pain and palpitations.   Gastrointestinal: Negative for abdominal pain.   Neurological: Negative for headaches.   Psychiatric/Behavioral: Negative for hallucinations.          Objective:     Vital Signs (Most Recent):  Temp: 98.5 °F (36.9 °C) (07/22/17 1025)  Pulse: 94 (07/22/17 1145)  Resp: 11 (07/22/17 1145)  BP: (!) 127/58 (07/22/17 1145)  SpO2: 100 % (07/22/17 1145) Vital Signs (24h Range):  Temp:  [97.6 °F (36.4 °C)-99 °F (37.2 °C)] 98.5 °F (36.9 °C)  Pulse:  [] 94  Resp:  [10-33] 11  SpO2:  [90 %-100 %] 100 %  BP: (111-160)/(55-68) 127/58     Weight: 67.9 kg (149 lb 11.2 oz)  Body mass index is 24.91 kg/m².    Physical Exam  Constitutional: no distress  Head: atraumatic  Eyes: No icteric sclerae.  Neck: Normal range of motion.   Cardiovascular: irregular rhythm    Pulmonary/Chest: Effort normal. No respiratory distress.   Musculoskeletal: Normal range of motion.   Neurological: Alert and oriented to person, place  EOMI; no nystagmus; no facial asymmetry, tongue protrudes midline, palate elevates symmetrically  No sensory deficits  Strength: 5/5 in UE's and LE's       Significant Labs:   CBC:   Recent Labs  Lab 07/21/17  0627 07/21/17  1936 07/22/17  0446 07/22/17  1059   WBC 8.90  --  8.39  --    HGB 13.2 12.0 10.9* 9.8*   HCT 36.8* 33.8* 31.4* 28.8*     --  216  --      CMP:   Recent Labs  Lab 07/21/17  0627 07/21/17  1319 07/21/17  1936 07/22/17  0446   GLU 71  71 100  115* 84   *  117* 118* 119* 123*   K 5.4*  5.4* 5.5* 5.4* 5.1   CL 89*  89* 91* 94* 95   CO2 21*  21* 20* 18* 20*   BUN 58*  58* 58* 57* 60*   CREATININE 1.7*  1.7* 1.5* 1.4 1.2   CALCIUM 8.3*  8.3* 8.2* 8.2* 8.0*   PROT 6.7  --   --   --    ALBUMIN 3.1*  --   --   --    BILITOT 1.8*  --   --   --    ALKPHOS 59  --   --   --    AST 91*  --   --   --    ALT 64*  --   --   --    ANIONGAP 7*  7* 7* 7* 8   EGFRNONAA 26*  26* 30* 33* 40*       Significant Imaging:   MRI brain  Impression         1.  No acute intracranial processes.  2.  The ventriculitis changes most likely from chronic microvascular ischemia.      Electronically signed by: GILBERTO GRAHAM MD  Date: 07/21/17  Time: 07:44        Assessment and Plan:     89 y/o female consulted for hallucinations:    1. Hallucinations: likely from metabolic causes (hyponatremia, abnormal renal function).   MRI brain showed no acute abnormalities.   -Will monitor for now. Would avoid benzo's if agitation.       Active Diagnoses:    Diagnosis Date Noted POA    PRINCIPAL PROBLEM:  Metabolic encephalopathy [G93.41] 07/20/2017 Yes    DNR (do not resuscitate) [Z66] 07/21/2017 Yes    Anemia [D64.9] 07/20/2017 Yes    Acute renal failure superimposed on stage 3 chronic kidney disease [N17.9, N18.3] 07/20/2017 Yes    Urinary retention [R33.9] 07/13/2017 Yes    Chronic diastolic congestive heart failure [I50.32] 01/02/2017 Yes     Chronic      Problems Resolved During this Admission:    Diagnosis Date Noted Date Resolved POA       VTE Risk Mitigation         Ordered     Medium Risk of VTE  Once      07/21/17 0149     Reason for No Pharmacological VTE Prophylaxis  Once      07/21/17 0149     dabigatran etexilate capsule 75 mg  2 times daily     Route:  Oral        07/21/17 0149          Thank you for your consult. I will follow-up with patient. Please contact us if you have any additional questions.    Umair Gomez MD  Neurology  [facility]

## 2017-07-22 NOTE — SUBJECTIVE & OBJECTIVE
Interval History: Pt is hard of hearing but reports some auditory hallucinations but answers questions appropriately.    Review of Systems   Constitutional: Positive for appetite change. Negative for chills and fever.   Respiratory: Negative for shortness of breath.    Cardiovascular: Negative for chest pain.     Objective:     Vital Signs (Most Recent):  Temp: 98.3 °F (36.8 °C) (07/21/17 1920)  Pulse: 64 (07/21/17 1955)  Resp: 18 (07/21/17 1920)  BP: (!) 121/57 (07/21/17 1920)  SpO2: 96 % (07/21/17 1920) Vital Signs (24h Range):  Temp:  [96.9 °F (36.1 °C)-98.3 °F (36.8 °C)] 98.3 °F (36.8 °C)  Pulse:  [64-99] 64  Resp:  [17-20] 18  SpO2:  [93 %-97 %] 96 %  BP: (106-132)/(56-80) 121/57     Weight: 67.9 kg (149 lb 11.2 oz)  Body mass index is 24.91 kg/m².    Intake/Output Summary (Last 24 hours) at 07/21/17 2203  Last data filed at 07/21/17 2200   Gross per 24 hour   Intake                0 ml   Output              300 ml   Net             -300 ml      Physical Exam   Constitutional: She is oriented to person, place, and time. She appears well-developed.   Thin and frail.   HENT:   Head: Normocephalic.   +temporal wasting, hard of hearing.   Eyes: EOM are normal. Pupils are equal, round, and reactive to light.   Neck: Normal range of motion. Neck supple.   Cardiovascular: Normal rate and regular rhythm.    Pulmonary/Chest: Effort normal and breath sounds normal.   Abdominal: Soft. She exhibits distension.   Musculoskeletal: Normal range of motion. She exhibits edema.   Neurological: She is alert and oriented to person, place, and time.   Skin: Skin is warm and dry.   Psychiatric: Her behavior is normal. Thought content normal.   + auditory hallucination although patient is aware of difference between what's real and not       Significant Labs: All pertinent labs within the past 24 hours have been reviewed.    Significant Imaging: I have reviewed and interpreted all pertinent imaging results/findings within the past 24  hours.

## 2017-07-22 NOTE — PROGRESS NOTES
"[facility] Medicine  Progress Note    Patient Name: Rachael Almaguer  MRN: [id number]  Patient Class: IP- Inpatient   Admission Date: 7/20/2017  Length of Stay: 1 days  Attending Physician: Tala Romeo MD  Primary Care Provider: Omi Thomas MD        Subjective:     Principal Problem:Metabolic encephalopathy    HPI:  Rachael Almaguer is a 89 y.o. with medical history HTN, CAD, and depression. She presented for evaluation of 5 day history of hallucinations since discharge from this hospital on Saturday. Patient was admitted for urinary retension. History taken from patients chart and patient.    Per patient, who surprisingly gives good history except for the visual hallucinations, she had been seeing people who are not there. She tells me that her 10 year old granddaughter is in the chair in the room during history taking. However, she is able to tell me that on Monday she saw her PCP who told her "you don't look good". She also saw her cardiologist (Dr. Whiting) today who told her to come to the hospital. Additionally, patient reports BLLE swelling.    Patient is found to have significant drop in H/H since 2 weeks ago was 14.1/41.0 now 10.5/30.9; dehydration with BUN/CR/GFR 57/1.8/24 compared to Jan her renal functions were normal.            Hospital Course:  Ms. Almaguer was admitted with metabolic encephalopathy due to hyponatremia. Workup with MRI unremarkable and appreciate Neurology input.    Interval History: Pt is hard of hearing but reports some auditory hallucinations but answers questions appropriately.    Review of Systems   Constitutional: Positive for appetite change. Negative for chills and fever.   Respiratory: Negative for shortness of breath.    Cardiovascular: Negative for chest pain.     Objective:     Vital Signs (Most Recent):  Temp: 98.3 °F (36.8 °C) (07/21/17 1920)  Pulse: 64 (07/21/17 1955)  Resp: 18 (07/21/17 1920)  BP: (!) 121/57 (07/21/17 1920)  SpO2: 96 % " (07/21/17 1920) Vital Signs (24h Range):  Temp:  [96.9 °F (36.1 °C)-98.3 °F (36.8 °C)] 98.3 °F (36.8 °C)  Pulse:  [64-99] 64  Resp:  [17-20] 18  SpO2:  [93 %-97 %] 96 %  BP: (106-132)/(56-80) 121/57     Weight: 67.9 kg (149 lb 11.2 oz)  Body mass index is 24.91 kg/m².    Intake/Output Summary (Last 24 hours) at 07/21/17 2203  Last data filed at 07/21/17 2200   Gross per 24 hour   Intake                0 ml   Output              300 ml   Net             -300 ml      Physical Exam   Constitutional: She is oriented to person, place, and time. She appears well-developed.   Thin and frail.   HENT:   Head: Normocephalic.   +temporal wasting, hard of hearing.   Eyes: EOM are normal. Pupils are equal, round, and reactive to light.   Neck: Normal range of motion. Neck supple.   Cardiovascular: Normal rate and regular rhythm.    Pulmonary/Chest: Effort normal and breath sounds normal.   Abdominal: Soft. She exhibits distension.   Musculoskeletal: Normal range of motion. She exhibits edema.   Neurological: She is alert and oriented to person, place, and time.   Skin: Skin is warm and dry.   Psychiatric: Her behavior is normal. Thought content normal.   + auditory hallucination although patient is aware of difference between what's real and not       Significant Labs: All pertinent labs within the past 24 hours have been reviewed.    Significant Imaging: I have reviewed and interpreted all pertinent imaging results/findings within the past 24 hours.    Assessment/Plan:      * Metabolic encephalopathy    Unclear etiology at first given Na level was normal, but repeat level markedly different with hyponatremia of 117 and repeat confirmed true value. Workup with head CT and MRI negative, non-focal exam. Recent admit for urinary retention made infectious process a consideration but all cultures are negative. Will begin de-escalation with d/c of Vancomycin and Cipro today. Continue bladder scans and slow correction of Na level, no  greater than 0.5 mEq/hr, and close monitoring with labs. Appreciate Neurology input.        DNR (do not resuscitate)    Reviewed with patient and family, she is DNR. Palliative care consulted for counseling and LaPOST filled out.          Acute renal failure superimposed on stage 3 chronic kidney disease    Patient with worsening renal functions creatine 1.8. She is on lasix and lisinopril at home will hold, IVF and close monitoring.        Anemia    As stated in HPI her H/H is lower than 2 weeks ago Hgb from 14.1--->10.5; urinalysis negative for hematuria; stool studies and monitor with H/H.        Urinary retention    Pt was here last week for urinary retention and started on flomax. Bladder scan and straight cath if needed.        Chronic diastolic congestive heart failure    No evidence of decompensation and with likely over-diuresis. Hold diuretics and judicious IVF as discussed above.          VTE Risk Mitigation         Ordered     Medium Risk of VTE  Once      07/21/17 0149     Reason for No Pharmacological VTE Prophylaxis  Once      07/21/17 0149     dabigatran etexilate capsule 75 mg  2 times daily     Route:  Oral        07/21/17 0149          Tala Romeo MD  Department of Hospital Medicine   [facility]

## 2017-07-22 NOTE — ASSESSMENT & PLAN NOTE
No evidence of decompensation and with likely over-diuresis. Hold diuretics and judicious IVF as discussed above.

## 2017-07-22 NOTE — ANESTHESIA PREPROCEDURE EVALUATION
07/22/2017  Rachael Almaguer is a 89 y.o., female.    Anesthesia Evaluation    I have reviewed the Patient Summary Reports.     I have reviewed the Medications.     Review of Systems  Anesthesia Hx:  No previous Anesthesia    Social:  Former Smoker, No Alcohol Use    Cardiovascular:   Hypertension Past MI CAD   CHF Echo 7/13/2017:  CONCLUSIONS     1 - Biatrial enlargement.     2 - Concentric hypertrophy.     3 - Normal left ventricular systolic function (EF 60-65%).     4 - Diastolic dysfunction is present.     5 - There is severe pulmonary hypertension.     6 - Moderate mitral regurgitation.     7 - Moderate tricuspid regurgitation.     8 - The mitral valve is mildly sclerotic with evidence of prolapse.     9 - Moderate aortic regurgitation.     10 - There is localized calcification of the posterior leaflet of the MV..    Renal/:   Chronic Renal Disease    Psych:   Psychiatric History             Anesthesia Plan  Type of Anesthesia, risks & benefits discussed:  Anesthesia Type:  general  Patient's Preference:   Intra-op Monitoring Plan:   Intra-op Monitoring Plan Comments:   Post Op Pain Control Plan:   Post Op Pain Control Plan Comments:   Induction:    Beta Blocker:  Patient is on a Beta-Blocker and has received one dose within the past 24 hours (No further documentation required).       Informed Consent: Patient representative understands risks and agrees with Anesthesia plan.  Questions answered. Anesthesia consent signed with patient representative.  ASA Score: 4     Day of Surgery Review of History & Physical:    H&P update referred to the surgeon.         Ready For Surgery From Anesthesia Perspective.

## 2017-07-22 NOTE — SUBJECTIVE & OBJECTIVE
Interval History: Pt had return of occult blood + studies, then immediately had 2 melanotic stools seen by nurse this morning just after 6am. Pt was taken to the endoscopy s/p EGD with large amount of blood and food in the stomach and duodenal bulb.  Mucosa could not be examined well. Pt transferred to ICU and Praxbind given. Pt's vitals are currently stable in the ICU.    Review of Systems   Constitutional: Negative for chills and fever.   Respiratory: Negative for shortness of breath.    Cardiovascular: Negative for chest pain.   Gastrointestinal: Positive for blood in stool.     Objective:     Vital Signs (Most Recent):  Temp: 98.5 °F (36.9 °C) (07/22/17 1025)  Pulse: 100 (07/22/17 1400)  Resp: 14 (07/22/17 1400)  BP: (!) 104/54 (07/22/17 1400)  SpO2: 100 % (07/22/17 1400) Vital Signs (24h Range):  Temp:  [97.6 °F (36.4 °C)-99 °F (37.2 °C)] 98.5 °F (36.9 °C)  Pulse:  [] 100  Resp:  [10-33] 14  SpO2:  [90 %-100 %] 100 %  BP: (104-160)/(54-68) 104/54     Weight: 67.9 kg (149 lb 11.2 oz)  Body mass index is 24.91 kg/m².    Intake/Output Summary (Last 24 hours) at 07/22/17 1648  Last data filed at 07/22/17 0957   Gross per 24 hour   Intake              400 ml   Output              300 ml   Net              100 ml      Physical Exam   Constitutional: She appears well-developed.   Thin and frail.   HENT:   Head: Normocephalic.   +temporal wasting, hard of hearing.   Eyes: EOM are normal. Pupils are equal, round, and reactive to light.   Neck: Normal range of motion. Neck supple.   Cardiovascular: Normal rate and regular rhythm.    Pulmonary/Chest: Effort normal and breath sounds normal.   Abdominal: Soft. She exhibits distension.   Musculoskeletal: Normal range of motion. She exhibits edema.   Neurological:   Slow to answer today, but aware of hospitalization. Still groggy from EGD anesthesia.   Skin: Skin is warm and dry.   Psychiatric: Her behavior is normal. Thought content normal.   Unclear if she is still  having auditory hallucinations.       Significant Labs: All pertinent labs within the past 24 hours have been reviewed.    Significant Imaging: I have reviewed and interpreted all pertinent imaging results/findings within the past 24 hours.

## 2017-07-22 NOTE — PLAN OF CARE
Problem: Fall Risk (Adult)  Goal: Identify Related Risk Factors and Signs and Symptoms  Related risk factors and signs and symptoms are identified upon initiation of Human Response Clinical Practice Guideline (CPG)   Outcome: Ongoing (interventions implemented as appropriate)   07/21/17 2048   Fall Risk   Related Risk Factors (Fall Risk) age-related changes;confusion/agitation;gait/mobility problems;history of falls;polypharmacy;sensory deficits;environment unfamiliar     Goal: Absence of Falls  Patient will demonstrate the desired outcomes by discharge/transition of care.   Outcome: Ongoing (interventions implemented as appropriate)   07/22/17 0002   Fall Risk (Adult)   Absence of Falls making progress toward outcome       Problem: Patient Care Overview  Goal: Plan of Care Review  Outcome: Ongoing (interventions implemented as appropriate)   07/22/17 0002   Coping/Psychosocial   Plan Of Care Reviewed With patient       Problem: Pressure Ulcer Risk (Estrada Scale) (Adult,Obstetrics,Pediatric)  Goal: Identify Related Risk Factors and Signs and Symptoms  Related risk factors and signs and symptoms are identified upon initiation of Human Response Clinical Practice Guideline (CPG)   Outcome: Ongoing (interventions implemented as appropriate)   07/22/17 0002   Pressure Ulcer Risk (Estrada Scale)   Related Risk Factors (Pressure Ulcer Risk (Estrada Scale)) age extremes;cognitive impairment;mobility impaired     Goal: Skin Integrity  Patient will demonstrate the desired outcomes by discharge/transition of care.   Outcome: Ongoing (interventions implemented as appropriate)   07/22/17 0002   Pressure Ulcer Risk (Estrada Scale) (Adult,Obstetrics,Pediatric)   Skin Integrity making progress toward outcome       Problem: Coping, Compromised Individual (Adult,Obstetrics,Pediatric)  Goal: Identify Related Risk Factors and Signs and Symptoms  Related risk factors and signs and symptoms are identified upon initiation of Human Response  Clinical Practice Guideline (CPG)   Outcome: Ongoing (interventions implemented as appropriate)   07/22/17 0002   Coping, Compromised Individual   Signs and Symptoms (Compromised Individual Coping) sleep disturbance     Goal: Effective Coping  Patient will demonstrate the desired outcomes by discharge/transition of care.   Outcome: Ongoing (interventions implemented as appropriate)   07/22/17 0002   Coping, Compromised Individual (Adult,Obstetrics,Pediatric)   Effective Coping making progress toward outcome       Problem: Infection, Risk/Actual (Adult)  Goal: Identify Related Risk Factors and Signs and Symptoms  Related risk factors and signs and symptoms are identified upon initiation of Human Response Clinical Practice Guideline (CPG)   Outcome: Ongoing (interventions implemented as appropriate)   07/22/17 0002   Infection, Risk/Actual   Related Risk Factors (Infection, Risk/Actual) age extremes;chronic illness/condition;exposure to microbes   Signs and Symptoms (Infection, Risk/Actual) weakness       Problem: Cardiac: Heart Failure (Adult)  Goal: Signs and Symptoms of Listed Potential Problems Will be Absent, Minimized or Managed (Cardiac: Heart Failure)  Signs and symptoms of listed potential problems will be absent, minimized or managed by discharge/transition of care (reference Cardiac: Heart Failure (Adult) CPG).   Outcome: Ongoing (interventions implemented as appropriate)   07/22/17 0002   Cardiac: Heart Failure   Problems Assessed (Heart Failure) all   Problems Present (Heart Failure) fluid/electrolyte imbalance

## 2017-07-22 NOTE — ASSESSMENT & PLAN NOTE
Unclear etiology at first given Na level was normal, but repeat level markedly different with hyponatremia of 117 and repeat confirmed to be true value. Workup with head CT and MRI negative, non-focal exam. Recent admit for urinary retention made infectious process a consideration but all cultures are negative. De-escalation with d/c of Vancomycin and Cipro antibiotics done yesterday, but will keep cefepime on for now given bleeding issues. Continue bladder scans and slow correction of Na level, no greater than 0.5 mEq/hr, and close monitoring with labs. Appreciate Neurology input.

## 2017-07-22 NOTE — CONSULTS
"Reason for Consult:      GI bleeding    HPI:    Pt is a 89 y.o. year old female was admitted two days ago with mental confusion.  She started passing dark melanotic tarry stool last night.  She has had two bloody stools and so we are requested to see her for further evaluation. There is no history of peptic ulcers in the past. Patient has been confused. She has significant pulmonary hypertension.  Her history was obtained from her son.     Past Medical History:   Diagnosis Date    Coronary artery disease     Depression     ON WELLBUTRIN    Hypertension        History reviewed. No pertinent surgical history.    Review of patient's allergies indicates:   Allergen Reactions    Aspirin     Xanax [alprazolam]        No current facility-administered medications on file prior to encounter.      Current Outpatient Prescriptions on File Prior to Encounter   Medication Sig Dispense Refill    alendronate (FOSAMAX) 10 MG Tab Take 5 mg by mouth once daily.      busPIRone (BUSPAR) 10 MG tablet Take 10 mg by mouth 2 (two) times daily.       dabigatran etexilate (PRADAXA) 75 mg Cap Take 75 mg by mouth 2 (two) times daily. "Do NOT break, chew, or open capsules."      furosemide (LASIX) 20 MG tablet Take 1 tablet (20 mg total) by mouth 2 (two) times daily. 60 tablet 0    lisinopril 10 MG tablet Take 1 tablet (10 mg total) by mouth once daily. 30 tablet 0    metoprolol tartrate (LOPRESSOR) 50 MG tablet Take 50 mg by mouth 2 (two) times daily.      mirtazapine (REMERON) 15 MG tablet Take 15 mg by mouth every evening.      MV, MIN #36/IRON,CARBONYL/FA (GERITOL COMPLETE ORAL) Take by mouth once daily.       acetaminophen (TYLENOL) 325 MG tablet Take 325 mg by mouth Daily.       Scheduled Meds:   ceFEPime (MAXIPIME) IVPB  1 g Intravenous Q24H    dabigatran etexilate  75 mg Oral BID    metoprolol tartrate  50 mg Oral BID     Continuous Infusions:   PRN Meds:.acetaminophen, hydrALAZINE, ondansetron    Social History "     Social History    Marital status:      Spouse name: N/A    Number of children: N/A    Years of education: N/A     Occupational History    Not on file.     Social History Main Topics    Smoking status: Former Smoker    Smokeless tobacco: Never Used    Alcohol use No    Drug use: No    Sexual activity: Not Currently     Other Topics Concern    Not on file     Social History Narrative    No narrative on file       Family history:  History reviewed. No pertinent family history.      Endoscopic History:  none    Review of Systems -     Could not be done as the patient is confused.  Physical Exam -    General: Well developed, well nourished, no apparent distress  Vital Signs Range (Last 24H):  Temp:  [97.4 °F (36.3 °C)-98.4 °F (36.9 °C)]   Pulse:  [64-98]   Resp:  [17-20]   BP: (111-130)/(55-62)   SpO2:  [96 %-100 %]   HEENT: Anicteric, PERRLA  CVS: S1, S2, no murmers/rubs  Lungs: CTA-B, normal excursion  Abdomen: Soft, NT, ND, normal BS's  Extremities: 2+ edema  Skin: No rashes/lesions.    Labs:    Recent Labs  Lab 07/22/17  0446   CALCIUM 8.0*   *   K 5.1   CO2 20*   CL 95   BUN 60*   CREATININE 1.2     Recent Results (from the past 336 hour(s))   CBC auto differential    Collection Time: 07/22/17  4:46 AM   Result Value Ref Range    WBC 8.39 3.90 - 12.70 K/uL    Hemoglobin 10.9 (L) 12.0 - 16.0 g/dL    Hematocrit 31.4 (L) 37.0 - 48.5 %    Platelets 216 150 - 350 K/uL   CBC auto differential    Collection Time: 07/21/17  6:27 AM   Result Value Ref Range    WBC 8.90 3.90 - 12.70 K/uL    Hemoglobin 13.2 12.0 - 16.0 g/dL    Hematocrit 36.8 (L) 37.0 - 48.5 %    Platelets 209 150 - 350 K/uL   CBC auto differential    Collection Time: 07/20/17 12:50 PM   Result Value Ref Range    WBC 8.60 3.90 - 12.70 K/uL    Hemoglobin 10.5 (L) 12.0 - 16.0 g/dL    Hematocrit 30.9 (L) 37.0 - 48.5 %    Platelets 158 150 - 350 K/uL     No results for input(s): INR, APTT in the last 24 hours.    Invalid input(s):  PT    Imaging:      Assessment:: UGI bleeding ?? cause  Patient Active Problem List   Diagnosis    Venous ulcer of right leg    Hyponatremia    Peripheral edema    Chronic atrial fibrillation    Cellulitis of leg, right    Chronic diastolic congestive heart failure    Hypokalemia    Elevated troponin    Chronic diastolic CHF (congestive heart failure)    Urinary retention    NSTEMI (non-ST elevated myocardial infarction)    Metabolic encephalopathy    Anemia    Acute renal failure superimposed on stage 3 chronic kidney disease    DNR (do not resuscitate)       Recommendations:  1. 89 years old female comes with mental confusion but starts having UGI bleeding with large melanotic stool.  She is hyponatremic and is at risk.  I have discussed the procedure in detail with her son at the bed side.  I have explained the high risk in this patient.  He understands and wants to proceed with the procedure.  He was explained of hyponatremia, pulmonary hypertension and her age and he understood. I will do an EGD now.      I would like to take this opportunity to thank you for this consult.  If you have any questions or concerns, please do not hesitate to contact me.       Betito Aguilar MD

## 2017-07-23 ENCOUNTER — ANESTHESIA (OUTPATIENT)
Dept: ENDOSCOPY | Facility: HOSPITAL | Age: 82
DRG: 070 | End: 2017-07-23
Payer: MEDICARE

## 2017-07-23 ENCOUNTER — SURGERY (OUTPATIENT)
Age: 82
End: 2017-07-23

## 2017-07-23 ENCOUNTER — ANESTHESIA EVENT (OUTPATIENT)
Dept: ENDOSCOPY | Facility: HOSPITAL | Age: 82
DRG: 070 | End: 2017-07-23
Payer: MEDICARE

## 2017-07-23 VITALS — RESPIRATION RATE: 18 BRPM

## 2017-07-23 PROBLEM — K27.9 PUD (PEPTIC ULCER DISEASE): Status: ACTIVE | Noted: 2017-07-23

## 2017-07-23 LAB
ANION GAP SERPL CALC-SCNC: 4 MMOL/L
BASOPHILS # BLD AUTO: 0.01 K/UL
BASOPHILS NFR BLD: 0.1 %
BLD PROD TYP BPU: NORMAL
BLOOD UNIT EXPIRATION DATE: NORMAL
BLOOD UNIT TYPE CODE: 5100
BLOOD UNIT TYPE: NORMAL
BUN SERPL-MCNC: 56 MG/DL
CALCIUM SERPL-MCNC: 7.5 MG/DL
CHLORIDE SERPL-SCNC: 104 MMOL/L
CO2 SERPL-SCNC: 26 MMOL/L
CODING SYSTEM: NORMAL
CREAT SERPL-MCNC: 0.8 MG/DL
DIFFERENTIAL METHOD: ABNORMAL
DISPENSE STATUS: NORMAL
EOSINOPHIL # BLD AUTO: 0.1 K/UL
EOSINOPHIL NFR BLD: 0.4 %
ERYTHROCYTE [DISTWIDTH] IN BLOOD BY AUTOMATED COUNT: 16.8 %
EST. GFR  (AFRICAN AMERICAN): >60 ML/MIN/1.73 M^2
EST. GFR  (NON AFRICAN AMERICAN): >60 ML/MIN/1.73 M^2
GLUCOSE SERPL-MCNC: 100 MG/DL
HCT VFR BLD AUTO: 24.4 %
HCT VFR BLD AUTO: 24.9 %
HCT VFR BLD AUTO: 31.7 %
HCT VFR BLD AUTO: 32 %
HGB BLD-MCNC: 10.9 G/DL
HGB BLD-MCNC: 11.1 G/DL
HGB BLD-MCNC: 8.5 G/DL
HGB BLD-MCNC: 8.6 G/DL
LYMPHOCYTES # BLD AUTO: 1.6 K/UL
LYMPHOCYTES NFR BLD: 11.4 %
MAGNESIUM SERPL-MCNC: 1 MG/DL
MCH RBC QN AUTO: 32.2 PG
MCHC RBC AUTO-ENTMCNC: 34.5 G/DL
MCV RBC AUTO: 93 FL
MONOCYTES # BLD AUTO: 1.6 K/UL
MONOCYTES NFR BLD: 11.5 %
NEUTROPHILS # BLD AUTO: 10.9 K/UL
NEUTROPHILS NFR BLD: 76.6 %
PHOSPHATE SERPL-MCNC: 2.2 MG/DL
PLATELET # BLD AUTO: 124 K/UL
PMV BLD AUTO: 8.5 FL
POTASSIUM SERPL-SCNC: 4.7 MMOL/L
RBC # BLD AUTO: 2.67 M/UL
SODIUM SERPL-SCNC: 134 MMOL/L
TRANS ERYTHROCYTES VOL PATIENT: NORMAL ML
WBC # BLD AUTO: 14.25 K/UL

## 2017-07-23 PROCEDURE — 43236 UPPR GI SCOPE W/SUBMUC INJ: CPT | Performed by: INTERNAL MEDICINE

## 2017-07-23 PROCEDURE — 84100 ASSAY OF PHOSPHORUS: CPT

## 2017-07-23 PROCEDURE — 63600175 PHARM REV CODE 636 W HCPCS: Performed by: INTERNAL MEDICINE

## 2017-07-23 PROCEDURE — D9220A PRA ANESTHESIA: Mod: ANES,,, | Performed by: ANESTHESIOLOGY

## 2017-07-23 PROCEDURE — 27201028 HC NEEDLE, SCLERO: Performed by: INTERNAL MEDICINE

## 2017-07-23 PROCEDURE — 85018 HEMOGLOBIN: CPT | Mod: 91

## 2017-07-23 PROCEDURE — 25000003 PHARM REV CODE 250: Performed by: INTERNAL MEDICINE

## 2017-07-23 PROCEDURE — 0W3P8ZZ CONTROL BLEEDING IN GASTROINTESTINAL TRACT, VIA NATURAL OR ARTIFICIAL OPENING ENDOSCOPIC: ICD-10-PCS | Performed by: INTERNAL MEDICINE

## 2017-07-23 PROCEDURE — 83735 ASSAY OF MAGNESIUM: CPT

## 2017-07-23 PROCEDURE — 63600175 PHARM REV CODE 636 W HCPCS: Performed by: EMERGENCY MEDICINE

## 2017-07-23 PROCEDURE — 20000000 HC ICU ROOM

## 2017-07-23 PROCEDURE — 85025 COMPLETE CBC W/AUTO DIFF WBC: CPT

## 2017-07-23 PROCEDURE — 37000009 HC ANESTHESIA EA ADD 15 MINS: Performed by: INTERNAL MEDICINE

## 2017-07-23 PROCEDURE — P9021 RED BLOOD CELLS UNIT: HCPCS

## 2017-07-23 PROCEDURE — 25000003 PHARM REV CODE 250: Performed by: NURSE ANESTHETIST, CERTIFIED REGISTERED

## 2017-07-23 PROCEDURE — 36430 TRANSFUSION BLD/BLD COMPNT: CPT

## 2017-07-23 PROCEDURE — 25000003 PHARM REV CODE 250: Performed by: HOSPITALIST

## 2017-07-23 PROCEDURE — 27200997: Performed by: INTERNAL MEDICINE

## 2017-07-23 PROCEDURE — 85014 HEMATOCRIT: CPT | Mod: 91

## 2017-07-23 PROCEDURE — C9113 INJ PANTOPRAZOLE SODIUM, VIA: HCPCS | Performed by: INTERNAL MEDICINE

## 2017-07-23 PROCEDURE — 43255 EGD CONTROL BLEEDING ANY: CPT | Performed by: INTERNAL MEDICINE

## 2017-07-23 PROCEDURE — 0D568ZZ DESTRUCTION OF STOMACH, VIA NATURAL OR ARTIFICIAL OPENING ENDOSCOPIC: ICD-10-PCS | Performed by: INTERNAL MEDICINE

## 2017-07-23 PROCEDURE — 36415 COLL VENOUS BLD VENIPUNCTURE: CPT

## 2017-07-23 PROCEDURE — 85014 HEMATOCRIT: CPT

## 2017-07-23 PROCEDURE — 37000008 HC ANESTHESIA 1ST 15 MINUTES: Performed by: INTERNAL MEDICINE

## 2017-07-23 PROCEDURE — 80048 BASIC METABOLIC PNL TOTAL CA: CPT

## 2017-07-23 PROCEDURE — 3E0G8GC INTRODUCTION OF OTHER THERAPEUTIC SUBSTANCE INTO UPPER GI, VIA NATURAL OR ARTIFICIAL OPENING ENDOSCOPIC: ICD-10-PCS | Performed by: INTERNAL MEDICINE

## 2017-07-23 PROCEDURE — 85018 HEMOGLOBIN: CPT

## 2017-07-23 PROCEDURE — 63600175 PHARM REV CODE 636 W HCPCS: Performed by: NURSE ANESTHETIST, CERTIFIED REGISTERED

## 2017-07-23 PROCEDURE — D9220A PRA ANESTHESIA: Mod: CRNA,,, | Performed by: NURSE ANESTHETIST, CERTIFIED REGISTERED

## 2017-07-23 RX ORDER — PHENYLEPHRINE HCL IN 0.9% NACL 1 MG/10 ML
SYRINGE (ML) INTRAVENOUS
Status: DISPENSED
Start: 2017-07-23 | End: 2017-07-23

## 2017-07-23 RX ORDER — LIDOCAINE HYDROCHLORIDE 20 MG/ML
INJECTION, SOLUTION EPIDURAL; INFILTRATION; INTRACAUDAL; PERINEURAL
Status: DISPENSED
Start: 2017-07-23 | End: 2017-07-23

## 2017-07-23 RX ORDER — LIDOCAINE HCL/PF 100 MG/5ML
SYRINGE (ML) INTRAVENOUS
Status: DISCONTINUED | OUTPATIENT
Start: 2017-07-23 | End: 2017-07-23

## 2017-07-23 RX ORDER — FUROSEMIDE 10 MG/ML
40 INJECTION INTRAMUSCULAR; INTRAVENOUS ONCE
Status: COMPLETED | OUTPATIENT
Start: 2017-07-23 | End: 2017-07-23

## 2017-07-23 RX ORDER — MAGNESIUM SULFATE HEPTAHYDRATE 40 MG/ML
2 INJECTION, SOLUTION INTRAVENOUS ONCE
Status: COMPLETED | OUTPATIENT
Start: 2017-07-23 | End: 2017-07-23

## 2017-07-23 RX ORDER — PROPOFOL 10 MG/ML
VIAL (ML) INTRAVENOUS
Status: DISPENSED
Start: 2017-07-23 | End: 2017-07-23

## 2017-07-23 RX ORDER — METOPROLOL TARTRATE 1 MG/ML
INJECTION, SOLUTION INTRAVENOUS
Status: DISPENSED
Start: 2017-07-23 | End: 2017-07-23

## 2017-07-23 RX ORDER — PHENYLEPHRINE HYDROCHLORIDE 10 MG/ML
INJECTION INTRAVENOUS
Status: DISCONTINUED | OUTPATIENT
Start: 2017-07-23 | End: 2017-07-23

## 2017-07-23 RX ORDER — ETOMIDATE 2 MG/ML
INJECTION INTRAVENOUS
Status: DISCONTINUED
Start: 2017-07-23 | End: 2017-07-23 | Stop reason: WASHOUT

## 2017-07-23 RX ORDER — FUROSEMIDE 10 MG/ML
20 INJECTION INTRAMUSCULAR; INTRAVENOUS ONCE
Status: COMPLETED | OUTPATIENT
Start: 2017-07-23 | End: 2017-07-23

## 2017-07-23 RX ORDER — OLANZAPINE 10 MG/2ML
5 INJECTION, POWDER, FOR SOLUTION INTRAMUSCULAR ONCE
Status: COMPLETED | OUTPATIENT
Start: 2017-07-23 | End: 2017-07-23

## 2017-07-23 RX ORDER — PROPOFOL 10 MG/ML
VIAL (ML) INTRAVENOUS
Status: DISCONTINUED | OUTPATIENT
Start: 2017-07-23 | End: 2017-07-23

## 2017-07-23 RX ORDER — METOPROLOL TARTRATE 1 MG/ML
INJECTION, SOLUTION INTRAVENOUS
Status: DISCONTINUED | OUTPATIENT
Start: 2017-07-23 | End: 2017-07-23

## 2017-07-23 RX ORDER — SODIUM CHLORIDE 9 MG/ML
INJECTION, SOLUTION INTRAVENOUS CONTINUOUS PRN
Status: DISCONTINUED | OUTPATIENT
Start: 2017-07-23 | End: 2017-07-23

## 2017-07-23 RX ORDER — MORPHINE SULFATE 10 MG/ML
2 INJECTION INTRAMUSCULAR; INTRAVENOUS; SUBCUTANEOUS ONCE
Status: COMPLETED | OUTPATIENT
Start: 2017-07-23 | End: 2017-07-23

## 2017-07-23 RX ORDER — HYDROCODONE BITARTRATE AND ACETAMINOPHEN 500; 5 MG/1; MG/1
TABLET ORAL
Status: DISCONTINUED | OUTPATIENT
Start: 2017-07-23 | End: 2017-07-24

## 2017-07-23 RX ADMIN — OLANZAPINE 5 MG: 10 INJECTION, POWDER, LYOPHILIZED, FOR SOLUTION INTRAMUSCULAR at 10:07

## 2017-07-23 RX ADMIN — PROPOFOL 25 MG: 10 INJECTION, EMULSION INTRAVENOUS at 08:07

## 2017-07-23 RX ADMIN — PHENYLEPHRINE HYDROCHLORIDE 100 MCG: 10 INJECTION INTRAVENOUS at 08:07

## 2017-07-23 RX ADMIN — OLANZAPINE 5 MG: 10 INJECTION, POWDER, LYOPHILIZED, FOR SOLUTION INTRAMUSCULAR at 03:07

## 2017-07-23 RX ADMIN — POTASSIUM PHOSPHATE, MONOBASIC AND POTASSIUM PHOSPHATE, DIBASIC 30 MMOL: 224; 236 INJECTION, SOLUTION INTRAVENOUS at 02:07

## 2017-07-23 RX ADMIN — DEXTROSE 8 MG/HR: 50 INJECTION, SOLUTION INTRAVENOUS at 07:07

## 2017-07-23 RX ADMIN — METOPROLOL TARTRATE 2 MG: 5 INJECTION, SOLUTION INTRAVENOUS at 09:07

## 2017-07-23 RX ADMIN — MORPHINE SULFATE 2 MG: 10 INJECTION INTRAVENOUS at 12:07

## 2017-07-23 RX ADMIN — FUROSEMIDE 20 MG: 10 INJECTION, SOLUTION INTRAMUSCULAR; INTRAVENOUS at 04:07

## 2017-07-23 RX ADMIN — MAGNESIUM SULFATE IN WATER 2 G: 40 INJECTION, SOLUTION INTRAVENOUS at 03:07

## 2017-07-23 RX ADMIN — METOPROLOL TARTRATE 5 MG: 5 INJECTION INTRAVENOUS at 06:07

## 2017-07-23 RX ADMIN — LIDOCAINE HYDROCHLORIDE 50 MG: 20 INJECTION, SOLUTION INTRAVENOUS at 08:07

## 2017-07-23 RX ADMIN — CEFEPIME 1 G: 1 INJECTION, POWDER, FOR SOLUTION INTRAMUSCULAR; INTRAVENOUS at 04:07

## 2017-07-23 RX ADMIN — DEXTROSE 8 MG/HR: 50 INJECTION, SOLUTION INTRAVENOUS at 01:07

## 2017-07-23 RX ADMIN — SODIUM CHLORIDE: 0.9 INJECTION, SOLUTION INTRAVENOUS at 08:07

## 2017-07-23 RX ADMIN — FUROSEMIDE 40 MG: 10 INJECTION, SOLUTION INTRAMUSCULAR; INTRAVENOUS at 04:07

## 2017-07-23 RX ADMIN — SODIUM CHLORIDE: 0.9 INJECTION, SOLUTION INTRAVENOUS at 07:07

## 2017-07-23 RX ADMIN — SODIUM CHLORIDE 500 ML: 0.9 INJECTION, SOLUTION INTRAVENOUS at 08:07

## 2017-07-23 RX ADMIN — METOPROLOL TARTRATE 5 MG: 5 INJECTION INTRAVENOUS at 11:07

## 2017-07-23 NOTE — ASSESSMENT & PLAN NOTE
Patient had EGD on 7/22 and 7/23. 7/22- unable to visualize secondary to blood. Transfused blood. Second EGD on 7/23- esophageal and gastric ulcers with cautery and clips placed to gastric ulcer. Received further blood on 7/23- total of 4 units since admission. Discussed case with GI. Further bleeding?- surgery/IR consult.

## 2017-07-23 NOTE — PLAN OF CARE
"Problem: Patient Care Overview  Goal: Plan of Care Review  Outcome: Ongoing (interventions implemented as appropriate)  Pt remains in ICU. VSS. Atrial fib on tele monitor; HR >110-PRN metoprolol given x 2 with mild relief.  Highest temp was 100.7-pt insisting on having multiple blankets on today due to "feeling cold."  Remains on 3LNC.  Pt yelling out, inconsolable, very emotionally labile-morphine and zyprexa each given x1 with moderate relief.  Oriented to name, birthday, year, and place.  Follows commands x4.  EGD done at bedside today.  Pt received 1 unit PRBC today-H&H increased to 11.1 and 32.0.  Pt remains incontinent of stool and urine.  Pt had a total of 3 BMs today; the first was slightly maroon in color, the other 2 have been black/green liquid in appearance.  Pt received 40mg IV lasix after notifying Dr. Lucero that coarse crackles were auscultated over all lung fields following blood administration.  Pt receiving 2 gm magnesium sulfate and 30 mmol potassium phosphate for electrolyte replacement.  Pt turned frequently for comfort/skin protection.  Pt's sacrum is red, but blanchable.  No open wounds seen on sacrum or skin.  No evidence of skin breakdown/injury/falls this shift. POC reviewed with patient and pt's family.         "

## 2017-07-23 NOTE — NURSING
Dr. Aguilar called back to check on pt. Informed that h/h only went up .1 each with the blood. She did have one more large maroon stool after blood adm. Orders given for one more unit of blood and change egd to 8:00am. Informed pt with hx of chf did he want to give lasix between units. He gave order to give 20 mg between units.

## 2017-07-23 NOTE — NURSING
Paged Dr. Lucero. IV morphine given for ordered dose-pt never went to sleep, but stopped yelling out.  Pt is now yelling out again without being able to be redirected, pt is still tachycardic.  Cannot give metoprolol again yet and no other PRNs for calming effects.  MD ordered 5mg IV zyprexa x1 now.  Also, notified MD of H&H 11.1 & 32.0, 1 small BM, but was liquid, black, and coffee ground in appearance. No feliciano blood seen. MD verbalized acknowledgment.

## 2017-07-23 NOTE — ASSESSMENT & PLAN NOTE
Pt with feliciano melena on 7/22. GI consulted s/p EGD with extensive amount of blood noted in the stomach with poor visualization. Pt placed on PPI gtt, transferred to ICU for closer monitoring, and s/p Praxbind for Pradaxa reversal.

## 2017-07-23 NOTE — NURSING
Called and informed DR. Acosta and let him know that pt. had another dark maroon stool that brings it to 9 total stools for the day, after gave him her dx and hx. . Her last h/h was 8.4/24.3. He asked for what it was at admit. Gave him those numbers. Also, gave him the h/h from 7/21-13.2/36.8. He gave order for one unit of prbc and to transfuse them. He also, said to call GI and let them be aware..  Let him know that wile she is calm her hr is afib with rate 110's. But when she is excited hr up to the 140's.

## 2017-07-23 NOTE — NURSING
Called and spoke to DR. Aguilar to informed of h/h at 18:00 and let him know that pt had another big dark maroon stool for a total of 10 today. Informed I got an order to give one unit of blood per hospitalist. He stated ok and to make sure pt is on the books for egd tomorrow at 9:00am,. Called. Nursing Supervisor, Brook. She is aware to place pt on books for egd at 9:00am

## 2017-07-23 NOTE — SUBJECTIVE & OBJECTIVE
Interval History: Moaning but cannot tell me what is wrong.     Review of Systems   Unable to perform ROS: Dementia     Objective:     Vital Signs (Most Recent):  Temp: 97.9 °F (36.6 °C) (07/23/17 1105)  Pulse: (!) 116 (07/23/17 1100)  Resp: 19 (07/23/17 1100)  BP: 117/72 (07/23/17 1100)  SpO2: 100 % (07/23/17 1100) Vital Signs (24h Range):  Temp:  [97.9 °F (36.6 °C)-99.1 °F (37.3 °C)] 97.9 °F (36.6 °C)  Pulse:  [] 116  Resp:  [10-33] 19  SpO2:  [68 %-100 %] 100 %  BP: ()/(39-79) 117/72     Weight: 66 kg (145 lb 8.1 oz)  Body mass index is 24.21 kg/m².    Intake/Output Summary (Last 24 hours) at 07/23/17 1120  Last data filed at 07/23/17 1001   Gross per 24 hour   Intake          1665.58 ml   Output              150 ml   Net          1515.58 ml      Physical Exam   Constitutional: She appears well-developed and well-nourished.   HENT:   Head: Normocephalic and atraumatic.   Cardiovascular:   Tachy    Pulmonary/Chest: Breath sounds normal. No respiratory distress. She has no wheezes.   Abdominal: Soft.   Neurological: She is alert.   Skin: Skin is warm and dry.       Significant Labs:   BMP:   Recent Labs  Lab 07/22/17  1814 07/23/17  0802   GLU 80 100   * 134*   K 5.2* 4.7    104   CO2 24 26   BUN 62* 56*   CREATININE 0.9 0.8   CALCIUM 7.4* 7.5*   MG 1.5*  --      CBC:   Recent Labs  Lab 07/22/17  0446  07/22/17  1814 07/23/17  0051 07/23/17  0803   WBC 8.39  --   --   --  14.25*   HGB 10.9*  < > 8.4* 8.5* 8.6*   HCT 31.4*  < > 24.3* 24.4* 24.9*     --   --   --  124*   < > = values in this interval not displayed.    Significant Imaging:

## 2017-07-23 NOTE — NURSING
Spoke with Dr. Lucero about pt's excitability, yelling out, denying pain, but HR elevated and BP 150s-could indicate pain.  No PRNs for pain/agitation.  Dr. Lucero said give a one time dose of 2mg IV morphine and see how she does.

## 2017-07-23 NOTE — TRANSFER OF CARE
"Anesthesia Transfer of Care Note    Patient: Rachael Almaguer    Procedure(s) Performed: Procedure(s) (LRB):  ESOPHAGOGASTRODUODENOSCOPY (EGD) (N/A)    Patient location: ICU    Anesthesia Type: general    Transport from OR: Transported from OR on 2-3 L/min O2 by NC with adequate spontaneous ventilation    Post pain: adequate analgesia    Post assessment: no apparent anesthetic complications    Post vital signs: stable    Level of consciousness: awake    Nausea/Vomiting: no nausea/vomiting    Complications: none    Transfer of care protocol was followed      Last vitals:   Visit Vitals  BP (!) 135/59   Pulse (!) 144   Temp 37 °C (98.6 °F)   Resp 19   Ht 5' 5" (1.651 m)   Wt 66 kg (145 lb 8.1 oz)   SpO2 95%   Breastfeeding? No   BMI 24.21 kg/m²     "

## 2017-07-23 NOTE — OR NURSING
Patient tolerated procedure without any complications noted. Report as well as continuous care to JUAN Mckeon ICU nurse. Patient had large coffee grind colored bowel movement.

## 2017-07-23 NOTE — PROGRESS NOTES
EGD done:      Impression:    Hiatal hernia with ulceration (0.8 cm) with pigmented vessel  Bicapped, injected with epinephrine and clipped.    Plans: Monitor H & H, Transfuse one more unit. Consult IR and Surgery if she continues to bleed.

## 2017-07-23 NOTE — NURSING
Endo team finished at 0930. Report received from KELLIE Camacho RN. Pt had large loose bloody bowel movement x1.

## 2017-07-23 NOTE — NURSING
Paged Dr. Lucero. Zyprexa only showing up as IM administration-MD said okay to order 5mg zyprexa IM x1 dose.  Notified MD of coarse crackles over all lung fields since blood administration.  No more units ordered to be administered at this time, but one is prepared just in case.  MD said to give 40mg IV lasix x1 now.

## 2017-07-23 NOTE — NURSING
Called and gave h/h level of 8.5/24.4 after the unit of blood. She did have another large maroon stool. Informed that after reviewing her chart that she does have hx of chf and was just here on 7/13 for nstemi. Told him that pt is on ns at 75 ml /hr. He said to hold ivf for now.

## 2017-07-23 NOTE — NURSING
Pt had multiple incompatible IV infusions due at 1500.  Potassium phosphate and Magnesium sulfate are compatible and were y-sited together.  Protonix is not compatible with ordered potassium phosphate, magnesium sulfate, or cefepime.  2 RN's attempted to place additional PIV access.  House supervisor attempted to place additional IV.  Anesthesia placed PIV in R EJ, but site became swollen, and IV was removed.  No other IV access able to be obtained.  Magnesium Sulfate and Potassium phosphates paused to allow for 30 minute administration of cefepime.  Will resume potassium phosphate and magnesium sulfate once cefepime is complete.

## 2017-07-23 NOTE — PROGRESS NOTES
"[facility]  Progress Note    Patient Name: Rachael Almaguer  MRN: [id number]  Patient Class: IP- Inpatient   Admission Date: 7/20/2017  Length of Stay: 3 days  Attending Physician: Alexander Lucero MD  Primary Care Provider: Omi Thomas MD        Subjective:     Principal Problem:PUD (peptic ulcer disease)    HPI:  Rachael Almaguer is a 89 y.o. with medical history HTN, CAD, and depression. She presented for evaluation of 5 day history of hallucinations since discharge from this hospital on Saturday. Patient was admitted for urinary retension. History taken from patients chart and patient.    Per patient, who surprisingly gives good history except for the visual hallucinations, she had been seeing people who are not there. She tells me that her 10 year old granddaughter is in the chair in the room during history taking. However, she is able to tell me that on Monday she saw her PCP who told her "you don't look good". She also saw her cardiologist (Dr. Whiting) today who told her to come to the hospital. Additionally, patient reports BLLE swelling.    Patient is found to have significant drop in H/H since 2 weeks ago was 14.1/41.0 now 10.5/30.9; dehydration with BUN/CR/GFR 57/1.8/24 compared to Jan her renal functions were normal.            Hospital Course:  Ms. Almaguer was admitted with AMS of uncertain etiology in the beginning, but quickly became clear after repeat BMP showed Na was 117 causing metabolic encephalopathy. Pt had negative head CT and further workup with MRI was unremarkable. Neurology following. Pt with hypovolemia causing hyponatremia and all diuretics stopped and steady correction with NS in progress with close monitoring of electrolytes. Patient was transferred to the ICU on 7/22 for acute GI bleed.  GI was consulted and took the patient to EGD the same day.  Blood was found in the entire stomach.  The patient received multiple units of blood. She " continued to bleed and the patient was taken back to EGD on 7/23. This showed non-bleeding esophageal ulcer as well as a non-bleeding gastric ulcer with cautery and clips placed.  GI recommended continued ICU monitoring.  Patient is DNR. Patient receiving blood again on 7/23.     Interval History: Moaning but cannot tell me what is wrong.     Review of Systems   Unable to perform ROS: Dementia     Objective:     Vital Signs (Most Recent):  Temp: 97.9 °F (36.6 °C) (07/23/17 1105)  Pulse: (!) 116 (07/23/17 1100)  Resp: 19 (07/23/17 1100)  BP: 117/72 (07/23/17 1100)  SpO2: 100 % (07/23/17 1100) Vital Signs (24h Range):  Temp:  [97.9 °F (36.6 °C)-99.1 °F (37.3 °C)] 97.9 °F (36.6 °C)  Pulse:  [] 116  Resp:  [10-33] 19  SpO2:  [68 %-100 %] 100 %  BP: ()/(39-79) 117/72     Weight: 66 kg (145 lb 8.1 oz)  Body mass index is 24.21 kg/m².    Intake/Output Summary (Last 24 hours) at 07/23/17 1120  Last data filed at 07/23/17 1001   Gross per 24 hour   Intake          1665.58 ml   Output              150 ml   Net          1515.58 ml      Physical Exam   Constitutional: She appears well-developed and well-nourished.   HENT:   Head: Normocephalic and atraumatic.   Cardiovascular:   Tachy    Pulmonary/Chest: Breath sounds normal. No respiratory distress. She has no wheezes.   Abdominal: Soft.   Neurological: She is alert.   Skin: Skin is warm and dry.       Significant Labs:   BMP:   Recent Labs  Lab 07/22/17  1814 07/23/17  0802   GLU 80 100   * 134*   K 5.2* 4.7    104   CO2 24 26   BUN 62* 56*   CREATININE 0.9 0.8   CALCIUM 7.4* 7.5*   MG 1.5*  --      CBC:   Recent Labs  Lab 07/22/17  0446  07/22/17  1814 07/23/17  0051 07/23/17  0803   WBC 8.39  --   --   --  14.25*   HGB 10.9*  < > 8.4* 8.5* 8.6*   HCT 31.4*  < > 24.3* 24.4* 24.9*     --   --   --  124*   < > = values in this interval not displayed.    Significant Imaging:     Assessment/Plan:      * PUD (peptic ulcer disease)    Patient had EGD  on 7/22 and 7/23. 7/22- unable to visualize secondary to blood. Transfused blood. Second EGD on 7/23- esophageal and gastric ulcers with cautery and clips placed to gastric ulcer. Received further blood on 7/23- total of 4 units since admission. Discussed case with GI. Further bleeding?- surgery/IR consult.           Acute blood loss anemia    From ulcers as noted above. Frequent H/H's for now.  Transfuse as needed. Surgery/IR if needed.         Hyponatremia    As discussed  Correcting with close monitoring. 110 to 134. Resolved problem.         GIB (gastrointestinal bleeding)    Pt with feliciano melena on 7/22. GI consulted s/p EGD with extensive amount of blood noted in the stomach with poor visualization. Pt placed on PPI gtt, transferred to ICU for closer monitoring, and s/p Praxbind for Pradaxa reversal.         DNR (do not resuscitate)    Reviewed with patient and family, she is DNR. Palliative care consulted for counseling and LaPOST filled out.          Acute renal failure superimposed on stage 3 chronic kidney disease    Resolved with IV fluids. Elevated BUN likely from blood in GI tract.         Metabolic encephalopathy    Unclear etiology at first given Na level was normal, but repeat level markedly different with hyponatremia of 117 and repeat confirmed to be true value. Workup with head CT and MRI negative, non-focal exam. Recent admit for urinary retention made infectious process a consideration but all cultures are negative. De-escalation with d/c of Vancomycin and Cipro antibiotics done yesterday, but will keep cefepime on for now given bleeding issues. Continue bladder scans and slow correction of Na level, no greater than 0.5 mEq/hr, and close monitoring with labs. Appreciate Neurology input.        Urinary retention    Pt was here last week for urinary retention and started on flomax. Bladder scan and straight cath if needed.        Chronic diastolic congestive heart failure    No evidence of  decompensation and with likely over-diuresis just prior to admission. Continue to hold diuretics and judicious IVF as discussed above.        Hypophosphatemia- will check now and daily.   Hypomagnesemia- will check now and daily.     Tachycardia- Lopressor IV 5mg.now. Likely secondary to pain/acute blood loss/anxiety.     VTE Risk Mitigation         Ordered     Medium Risk of VTE  Once      07/21/17 0149     Reason for No Pharmacological VTE Prophylaxis  Once      07/21/17 0149        Continue ICU monitoring. Transfuse as needed.  Frequent H/H's. Discussed with family and GI.     Critical care time spent 45 minutes.     Alexander Booth MD  Department of Hospital Medicine   [facility]

## 2017-07-23 NOTE — PLAN OF CARE
Problem: Patient Care Overview  Goal: Plan of Care Review  Outcome: Ongoing (interventions implemented as appropriate)  vss and afebrile. Pt's mood is very liable.  She screams one minute then laughing the next.  Pt received 2 units prbc with last h/h -8.5/24.4. Pt received 20 mg of lasix between units.  Pt had 3 large maroon stools this shift. Pt remains npo for egd this am.  Pt had anopen abscess to ant. Medial knee that was expressed of white thick secretions.  Cleaned and bandaid to site.  Pt has busies all over her body. ( was on blood thinners). No falls,injury, or skin breakdown thru shift.

## 2017-07-23 NOTE — ANESTHESIA POSTPROCEDURE EVALUATION
"Anesthesia Post Evaluation    Patient: Rachael Almaguer    Procedure(s) Performed: Procedure(s) (LRB):  ESOPHAGOGASTRODUODENOSCOPY (EGD) (N/A)    Final Anesthesia Type: general  Patient location during evaluation: GI PACU  Patient participation: Yes- Able to Participate  Level of consciousness: awake and alert, awake and oriented  Post-procedure vital signs: reviewed and stable  Pain management: adequate  Airway patency: patent  PONV status at discharge: No PONV  Anesthetic complications: no      Cardiovascular status: blood pressure returned to baseline and hemodynamically stable  Respiratory status: unassisted, spontaneous ventilation and room air  Hydration status: euvolemic  Follow-up not needed.        Visit Vitals  BP (!) 135/59   Pulse (!) 144   Temp 37 °C (98.6 °F)   Resp 19   Ht 5' 5" (1.651 m)   Wt 66 kg (145 lb 8.1 oz)   SpO2 95%   Breastfeeding? No   BMI 24.21 kg/m²       Pain/Jennifer Score: Pain Assessment Performed: Yes (7/23/2017  7:50 AM)  Presence of Pain: denies (7/23/2017  7:50 AM)  Jennifre Score: 9 (7/22/2017 10:30 AM)      "

## 2017-07-23 NOTE — NURSING
Spoke with Dr. Lucero d/t elevated HR >110.  Metoprolol ordered q6hrs for HR>110.  Was told by anesthesia that pt received a dose of metoprolol during EGD, so would not be able to give now d/t ordered frequency.  Dr. Lucero said okay to give a dose now.

## 2017-07-24 PROBLEM — N18.30 ACUTE RENAL FAILURE SUPERIMPOSED ON STAGE 3 CHRONIC KIDNEY DISEASE: Status: RESOLVED | Noted: 2017-07-20 | Resolved: 2017-07-24

## 2017-07-24 PROBLEM — N17.9 ACUTE RENAL FAILURE SUPERIMPOSED ON STAGE 3 CHRONIC KIDNEY DISEASE: Status: RESOLVED | Noted: 2017-07-20 | Resolved: 2017-07-24

## 2017-07-24 PROBLEM — K92.2 GIB (GASTROINTESTINAL BLEEDING): Status: RESOLVED | Noted: 2017-07-22 | Resolved: 2017-07-24

## 2017-07-24 PROBLEM — D62 ACUTE BLOOD LOSS ANEMIA: Status: RESOLVED | Noted: 2017-07-20 | Resolved: 2017-07-24

## 2017-07-24 PROBLEM — R53.81 DEBILITY: Status: ACTIVE | Noted: 2017-07-24

## 2017-07-24 LAB
ANION GAP SERPL CALC-SCNC: 2 MMOL/L
BUN SERPL-MCNC: 43 MG/DL
CALCIUM SERPL-MCNC: 7.5 MG/DL
CHLORIDE SERPL-SCNC: 105 MMOL/L
CO2 SERPL-SCNC: 29 MMOL/L
CREAT SERPL-MCNC: 0.8 MG/DL
EST. GFR  (AFRICAN AMERICAN): >60 ML/MIN/1.73 M^2
EST. GFR  (NON AFRICAN AMERICAN): >60 ML/MIN/1.73 M^2
GLUCOSE SERPL-MCNC: 136 MG/DL
HCT VFR BLD AUTO: 30.1 %
HCT VFR BLD AUTO: 30.5 %
HCT VFR BLD AUTO: 32.2 %
HGB BLD-MCNC: 10.2 G/DL
HGB BLD-MCNC: 10.5 G/DL
HGB BLD-MCNC: 10.9 G/DL
MAGNESIUM SERPL-MCNC: 1.8 MG/DL
PHOSPHATE SERPL-MCNC: 3.3 MG/DL
POTASSIUM SERPL-SCNC: 4.2 MMOL/L
SODIUM SERPL-SCNC: 136 MMOL/L

## 2017-07-24 PROCEDURE — 63600175 PHARM REV CODE 636 W HCPCS: Performed by: EMERGENCY MEDICINE

## 2017-07-24 PROCEDURE — C9113 INJ PANTOPRAZOLE SODIUM, VIA: HCPCS | Performed by: INTERNAL MEDICINE

## 2017-07-24 PROCEDURE — 25000003 PHARM REV CODE 250: Performed by: EMERGENCY MEDICINE

## 2017-07-24 PROCEDURE — 25000003 PHARM REV CODE 250: Performed by: INTERNAL MEDICINE

## 2017-07-24 PROCEDURE — 27000221 HC OXYGEN, UP TO 24 HOURS

## 2017-07-24 PROCEDURE — 85014 HEMATOCRIT: CPT | Mod: 91

## 2017-07-24 PROCEDURE — 84100 ASSAY OF PHOSPHORUS: CPT

## 2017-07-24 PROCEDURE — 85018 HEMOGLOBIN: CPT | Mod: 91

## 2017-07-24 PROCEDURE — 11000001 HC ACUTE MED/SURG PRIVATE ROOM

## 2017-07-24 PROCEDURE — 86677 HELICOBACTER PYLORI ANTIBODY: CPT

## 2017-07-24 PROCEDURE — 36415 COLL VENOUS BLD VENIPUNCTURE: CPT

## 2017-07-24 PROCEDURE — 80048 BASIC METABOLIC PNL TOTAL CA: CPT

## 2017-07-24 PROCEDURE — 83735 ASSAY OF MAGNESIUM: CPT

## 2017-07-24 PROCEDURE — 94761 N-INVAS EAR/PLS OXIMETRY MLT: CPT

## 2017-07-24 PROCEDURE — 63600175 PHARM REV CODE 636 W HCPCS: Performed by: INTERNAL MEDICINE

## 2017-07-24 RX ORDER — DEXTROSE MONOHYDRATE AND SODIUM CHLORIDE 5; .9 G/100ML; G/100ML
INJECTION, SOLUTION INTRAVENOUS CONTINUOUS
Status: DISCONTINUED | OUTPATIENT
Start: 2017-07-24 | End: 2017-07-25

## 2017-07-24 RX ORDER — METOPROLOL TARTRATE 1 MG/ML
5 INJECTION, SOLUTION INTRAVENOUS ONCE
Status: DISCONTINUED | OUTPATIENT
Start: 2017-07-24 | End: 2017-07-24

## 2017-07-24 RX ORDER — DOXYLAMINE SUCCINATE 25 MG
TABLET ORAL 2 TIMES DAILY
Status: DISCONTINUED | OUTPATIENT
Start: 2017-07-24 | End: 2017-08-02 | Stop reason: HOSPADM

## 2017-07-24 RX ORDER — METOPROLOL TARTRATE 1 MG/ML
5 INJECTION, SOLUTION INTRAVENOUS ONCE
Status: COMPLETED | OUTPATIENT
Start: 2017-07-24 | End: 2017-07-24

## 2017-07-24 RX ADMIN — CEFEPIME 1 G: 1 INJECTION, POWDER, FOR SOLUTION INTRAMUSCULAR; INTRAVENOUS at 02:07

## 2017-07-24 RX ADMIN — MICONAZOLE NITRATE: 20 CREAM TOPICAL at 12:07

## 2017-07-24 RX ADMIN — DEXTROSE 8 MG/HR: 50 INJECTION, SOLUTION INTRAVENOUS at 05:07

## 2017-07-24 RX ADMIN — DEXTROSE 8 MG/HR: 50 INJECTION, SOLUTION INTRAVENOUS at 11:07

## 2017-07-24 RX ADMIN — DEXTROSE AND SODIUM CHLORIDE: 5; .9 INJECTION, SOLUTION INTRAVENOUS at 04:07

## 2017-07-24 RX ADMIN — METOPROLOL TARTRATE 5 MG: 5 INJECTION INTRAVENOUS at 04:07

## 2017-07-24 RX ADMIN — DEXTROSE 8 MG/HR: 50 INJECTION, SOLUTION INTRAVENOUS at 12:07

## 2017-07-24 RX ADMIN — MICONAZOLE NITRATE: 20 CREAM TOPICAL at 09:07

## 2017-07-24 NOTE — ASSESSMENT & PLAN NOTE
Reviewed with patient and family, she is DNR. Palliative care consulted for counseling and LaPOST filled out. Palliative care following.

## 2017-07-24 NOTE — PLAN OF CARE
07/24/17 1501   Discharge Reassessment   Assessment Type Discharge Planning Reassessment   Can the patient answer the patient profile reliably? (unable to assess--patient sleeping soundly)   How does the patient rate their overall health at the present time? (record)   Number of comorbid conditions (as recorded on the chart) Four   Discharge plan remains the same: No   Provided patient/caregiver education on the expected discharge date and the discharge plan Yes   Discharge Plan A Home;Home Health   Discharge Plan B Home with family   Change in patient condition or support system Yes   Patient choice form signed by patient/caregiver No   Explained to the the patient/caregiver why the discharge planned changed: No   Involved the patient/caregiver in establishing a new discharge plan: No   patient transferred to ICU s/p EGD on 7/22 (unable to complete, then done on 7/23). She transferred out of ICU to med/surg floor today. She was sleeping soundly during SW visit.   CLAUDIA spoke with her daughter Lyndsey (lives in [place]) and her daughter in law Heather (lives in [place]). Both report patient has been sleeping since her procedure yesterday pm. Family have been unable to assess her primary reason for admission (hallucinations). For discharge plan, both want patient to live with either on of them (or extended stay). They report that patient has declined to consider SNF. Additionally, they report that patient's son who lives locally believes patient remains capable of return home independent. At this point, patient unable contribute to discussion.   As both daughter and daughter in law live out of insurance area for services such as home health, CLAUDIA explained that the insurance has a travel benefit for some out of area expenses. CLAUDIA spoke with PHN RN care coordinator Maria Eugenia who informed CLAUDIA that this is no longer in plans. CLAUDIA called daughter Lyndsey who was with daughter in law Heather. CLAUDIA updated both that patient insurance NO LONGER HAS  compensation for out of area services.   SW did provide education regarding changing insurance in event patient decides to live out of her HMO area.   SW/CM will need discussion with patient once she is able to help clarify plan.   SW/CM will continue to assist, update plan as patient progresses and assist as needed.

## 2017-07-24 NOTE — PLAN OF CARE
Problem: Patient Care Overview  Goal: Plan of Care Review  Outcome: Ongoing (interventions implemented as appropriate)  Patient is sleeping, with short intervals of wakefulness.  When asked if in pain, patient shakes her head no.  Patient remains free from falls, is afebrile, and HR is in the low 90's-100, and has shown no tachy since 5mg lopressor  IV push administered.  Patient glasses, dentures and hearing aids were sent home with family.  Patient has bruising on bilateral arms, and bruising on left arm shows weeping of serosanguinous fluid after BP cuff used.  Loose gauze and coban are being used to protect patient left arm from BP cuff.  Patient's family is at the bedside.  Will continue to monitor patient's heart rate and rhythm, pain, respiratory effort and both verbal and nonverbal signs of pain.

## 2017-07-24 NOTE — NURSING
Patient transported onto unit from ICU by Lianna.  Patient on 2L O2 per NC, no SOB.  Patient tolerating IV fluids well, resting comfortably.  Daughter and DIL are both at bedside. Telemetry box #[id number] on patient, telemetry notified.   Will continue to monitor patient for pain, safety, cardiac rythym.

## 2017-07-24 NOTE — ASSESSMENT & PLAN NOTE
From ulcers as noted above. Frequent H/H's for now.  Transfuse as needed. Surgery/IR if needed. H/H stable.

## 2017-07-24 NOTE — PLAN OF CARE
Problem: Patient Care Overview  Goal: Plan of Care Review  Outcome: Ongoing (interventions implemented as appropriate)  Patient remains in ICU. Alert, oriented to self only. Verbal responses difficult to understand @ times. Easily excitable, restless, yells out frequently. Became extremely agitated this shift causing elevated HR- olanzapine 5mg IM x1 ordered and administered with positive effect. Patient extremely [place], however does follow simple commands intermittently. Continued atrial fibrillation on monitor. Weaned to 2L O2 via NC this shift, SpO2 WNL. BP stable. Afebrile. Patient with one medium sized black/green BM this shift, no felciiano blood noted. Incontinent of urine, multiple urine occurrences. Remains on protonix gtt. Q6 H&H levels remain stable. Turned/repositioned q2 hours to maintain skin integrity. Remains free from hospital acquired injuries and falls.

## 2017-07-24 NOTE — PROGRESS NOTES
[facility]  General Surgery  Progress Note    Subjective:     Interval History: Dark, old bloody in stool. No evidence of new bleeding.     Post-Op Info:  Procedure(s) (LRB):  ESOPHAGOGASTRODUODENOSCOPY (EGD) (N/A)   1 Day Post-Op      Medications:  Continuous Infusions:   pantoprazole 40 mg in dextrose 5 % 100 mL infusion (ready to mix system) 8 mg/hr (07/24/17 0600)     Scheduled Meds:   ceFEPime (MAXIPIME) IVPB  1 g Intravenous Q24H     PRN Meds:sodium chloride, sodium chloride, acetaminophen, hydrALAZINE, metoprolol, ondansetron     Objective:     Vital Signs (Most Recent):  Temp: 97.8 °F (36.6 °C) (07/24/17 0315)  Pulse: 97 (07/24/17 0600)  Resp: (!) 9 (07/24/17 0600)  BP: (!) 112/57 (07/24/17 0600)  SpO2: 99 % (07/24/17 0600) Vital Signs (24h Range):  Temp:  [97.8 °F (36.6 °C)-100.7 °F (38.2 °C)] 97.8 °F (36.6 °C)  Pulse:  [] 97  Resp:  [8-42] 9  SpO2:  [95 %-100 %] 99 %  BP: ()/(50-87) 112/57       Intake/Output Summary (Last 24 hours) at 07/24/17 0713  Last data filed at 07/24/17 0600   Gross per 24 hour   Intake             1640 ml   Output                0 ml   Net             1640 ml       Physical Exam  Resting comfortably  Abdomen soft, non tender    Significant Labs:  CBC:   Recent Labs  Lab 07/23/17  0803  07/24/17  0527   WBC 14.25*  --   --    RBC 2.67*  --   --    HGB 8.6*  < > 10.2*   HCT 24.9*  < > 30.5*   *  --   --    MCV 93  --   --    MCH 32.2*  --   --    MCHC 34.5  --   --    < > = values in this interval not displayed.    Significant Diagnostics:  I have reviewed all pertinent imaging results/findings within the past 24 hours.    Assessment/Plan:   89F with UGIB s/p EGD clip/epi. No evidence of continued bleed. H/h stable, responded appropriately to PRBC  Continue to monitor.  Active Diagnoses:    Diagnosis Date Noted POA    PRINCIPAL PROBLEM:  PUD (peptic ulcer disease) [K27.9] 07/23/2017 Yes    GIB (gastrointestinal bleeding) [K92.2] 07/22/2017  Yes    DNR (do not resuscitate) [Z66] 07/21/2017 Yes    Metabolic encephalopathy [G93.41] 07/20/2017 Yes    Acute blood loss anemia [D62] 07/20/2017 Yes    Acute renal failure superimposed on stage 3 chronic kidney disease [N17.9, N18.3] 07/20/2017 Yes    Urinary retention [R33.9] 07/13/2017 Yes    Chronic diastolic congestive heart failure [I50.32] 01/02/2017 Yes     Chronic    Hyponatremia [E87.1] 12/31/2016 Yes      Problems Resolved During this Admission:    Diagnosis Date Noted Date Resolved AMAYA Garcia MD  General Surgery  [facility]

## 2017-07-24 NOTE — NURSING TRANSFER
Nursing Transfer Note      7/24/2017   @ 1515 to [facility]  Transfer To: [id number]    Transfer via bed    Transfer with cardiac monitoring 2lnc    Transported by rn and transport    Medicines sent: cream micanazole    Chart send with patient: Yes    Notified: daughter    Patient reassessed at: upon arrival    Upon arrival to floor: cardiac monitor applied, patient oriented to room, call bell in reach and bed in lowest position

## 2017-07-24 NOTE — PROGRESS NOTES
"[facility] [place] [facility]  Progress Note    Patient Name: Rachael Almaguer  MRN: [id number]  Patient Class: IP- Inpatient   Admission Date: 7/20/2017  Length of Stay: 4 days  Attending Physician: Alexander Lucero MD  Primary Care Provider: Omi Thomas MD        Subjective:     Principal Problem:PUD (peptic ulcer disease)    HPI:  Rachael Almaguer is a 89 y.o. with medical history HTN, CAD, and depression. She presented for evaluation of 5 day history of hallucinations since discharge from this hospital on Saturday. Patient was admitted for urinary retension. History taken from patients chart and patient.    Per patient, who surprisingly gives good history except for the visual hallucinations, she had been seeing people who are not there. She tells me that her 10 year old granddaughter is in the chair in the room during history taking. However, she is able to tell me that on Monday she saw her PCP who told her "you don't look good". She also saw her cardiologist (Dr. Whiting) today who told her to come to the hospital. Additionally, patient reports BLLE swelling.    Patient is found to have significant drop in H/H since 2 weeks ago was 14.1/41.0 now 10.5/30.9; dehydration with BUN/CR/GFR 57/1.8/24 compared to Jan her renal functions were normal.            Hospital Course:  Ms. Almaguer was admitted with AMS of uncertain etiology in the beginning, but quickly became clear after repeat BMP showed Na was 117 causing metabolic encephalopathy. Pt had negative head CT and further workup with MRI was unremarkable. Neurology following. Pt with hypovolemia causing hyponatremia and all diuretics stopped and steady correction with NS in progress with close monitoring of electrolytes. Patient was transferred to the ICU on 7/22 for acute GI bleed.  GI was consulted and took the patient to EGD the same day.  Blood was found in the entire stomach.  The patient received multiple units of blood. She " continued to bleed and the patient was taken back to EGD on 7/23. This showed non-bleeding esophageal ulcer as well as a non-bleeding gastric ulcer with cautery and clips placed.  GI recommended continued ICU monitoring.  Patient is DNR. Patient receiving blood again on 7/23. She received a total of 4 units of blood. The patient was very agitated on 7/23 and Zyprexa was started. The patient's H/H stabilized and the patient was sent to the floor on 7/24. Speech, PT/OT were consulted on 7/24. The patient has had a general decline and Palliative care is following as well.  Very guarded prognosis as of this writing.     Interval History: No new issues. Patient resting.     Review of Systems   Unable to perform ROS: Acuity of condition   Constitutional: Negative for activity change.     Objective:     Vital Signs (Most Recent):  Temp: 97.8 °F (36.6 °C) (07/24/17 0315)  Pulse: 102 (07/24/17 0930)  Resp: 10 (07/24/17 0930)  BP: 125/61 (07/24/17 0930)  SpO2: 100 % (07/24/17 0930) Vital Signs (24h Range):  Temp:  [97.8 °F (36.6 °C)-100.7 °F (38.2 °C)] 97.8 °F (36.6 °C)  Pulse:  [] 102  Resp:  [8-42] 10  SpO2:  [94 %-100 %] 100 %  BP: ()/(50-87) 125/61     Weight: 66 kg (145 lb 8.1 oz)  Body mass index is 24.21 kg/m².    Intake/Output Summary (Last 24 hours) at 07/24/17 1026  Last data filed at 07/24/17 0900   Gross per 24 hour   Intake          1539.67 ml   Output                0 ml   Net          1539.67 ml      Physical Exam   Constitutional: She appears well-developed and well-nourished.   Cardiovascular: Normal rate.    Pulmonary/Chest: Effort normal.   Abdominal: Soft. There is no tenderness.   Skin: Skin is warm and dry.   Vitals reviewed.      Significant Labs:   BMP:   Recent Labs  Lab 07/24/17  0526 07/24/17  0527   GLU  --  136*   NA  --  136   K  --  4.2   CL  --  105   CO2  --  29   BUN  --  43*   CREATININE  --  0.8   CALCIUM  --  7.5*   MG 1.8  --      CBC:   Recent Labs  Lab 07/23/17  0803   07/23/17  1941 07/24/17  0015 07/24/17  0527   WBC 14.25*  --   --   --   --    HGB 8.6*  < > 10.9* 10.5* 10.2*   HCT 24.9*  < > 31.7* 30.1* 30.5*   *  --   --   --   --    < > = values in this interval not displayed.    Significant Imaging    Assessment/Plan:      * PUD (peptic ulcer disease)    Patient had EGD on 7/22 and 7/23. 7/22- unable to visualize secondary to blood. Transfused blood. Second EGD on 7/23- esophageal and gastric ulcers with cautery and clips placed to gastric ulcer. Received further blood on 7/23- total of 4 units since admission. Discussed case with GI. Further bleeding?- surgery/IR consult. H/H stable and will send to the floor.           Acute blood loss anemia    From ulcers as noted above. Frequent H/H's for now.  Transfuse as needed. Surgery/IR if needed. H/H stable.         Hyponatremia    As discussed  Correcting with close monitoring. 110 to 134. Resolved problem.         Debility    Will consult PT/OT           GIB (gastrointestinal bleeding)    Pt with feliciano melena on 7/22. GI consulted s/p EGD with extensive amount of blood noted in the stomach with poor visualization. Pt placed on PPI gtt, transferred to ICU for closer monitoring, and s/p Praxbind for Pradaxa reversal.         DNR (do not resuscitate)    Reviewed with patient and family, she is DNR. Palliative care consulted for counseling and LaPOST filled out. Palliative care following.           Acute renal failure superimposed on stage 3 chronic kidney disease    Resolved with IV fluids. Elevated BUN likely from blood in GI tract.         Metabolic encephalopathy    Unclear etiology at first given Na level was normal, but repeat level markedly different with hyponatremia of 117 and repeat confirmed to be true value. Workup with head CT and MRI negative, non-focal exam. Recent admit for urinary retention made infectious process a consideration but all cultures are negative.  Neuro consulted.          Urinary retention     Pt was here last week for urinary retention and started on flomax. Bladder scan and straight cath if needed.        Chronic diastolic congestive heart failure    No evidence of decompensation and with likely over-diuresis just prior to admission. Continue to hold diuretics and judicious IVF as discussed above.          VTE Risk Mitigation         Ordered     Medium Risk of VTE  Once      07/21/17 0149     Reason for No Pharmacological VTE Prophylaxis  Once      07/21/17 0149        Concerning prognosis for patient.  Palliative care following. H/H stable.   Will transfer to the floor.     Critical care time spent 40 minutes.     Alexander Booth MD  Department of Hospital Medicine   [facility]

## 2017-07-24 NOTE — NURSING
Telemetry called to inform that patient experiencing A-Fib with HR in the upper 120's to 130's.  Dr. Lucero notified, 5 mg lopressor IV push ordered and given (tele tecn notified), patient heart rate now in mid to high 80's, patient is alert and awake, and states no pain or discomfort.  MD ordered cardiology consult which has been placed.  Will continue to monitor patient for cardiac rhthym and heart rate.

## 2017-07-24 NOTE — PROGRESS NOTES
PALLIATIVE CARE PROGRESS NOTE:    Brief bedside visit.  Patient asleep (eyes and mouth open) and appears very frail.  Discussed with daughters at the bedside; patient originally wanted to discharge back to her own home and live independently.  Explained we will have to reassess her ability to care for herself.  Patient may require additional assistance (and daughters state she would be able to live with either of them).    They asked additional questions about hospice care.  Brief discussion about home hospice.      Palliative Care will continue to follow as needed.    Viridiana Cherry, LIN, RN, CCRN, CHPN   Palliative Care Nurse Coordinator   [facility]  [phone]

## 2017-07-24 NOTE — PLAN OF CARE
Problem: Patient Care Overview  Goal: Plan of Care Review  Outcome: Ongoing (interventions implemented as appropriate)  Plan of care reviewed. No falls/injuries this shift. Skin assessed. Daughter and daughter in law at bedside. HNH stable. Afebrile. Seen by [facility] care today, new consults for pt/ot/st on transfer to med - surg with tele monitoring. Daughter aware of transfer.

## 2017-07-24 NOTE — ASSESSMENT & PLAN NOTE
Unclear etiology at first given Na level was normal, but repeat level markedly different with hyponatremia of 117 and repeat confirmed to be true value. Workup with head CT and MRI negative, non-focal exam. Recent admit for urinary retention made infectious process a consideration but all cultures are negative.  Neuro consulted.

## 2017-07-24 NOTE — CARE UPDATE
"Transfer Summary:     Please see "hospital course" in my note from 7/24.  Patient's prior level of functioning per the family was independent living by herself.  General decline this hospital stay. DNR. Palliative care following. H/H stable. Speech/PT/OT.  Guarded prognosis.   "

## 2017-07-24 NOTE — PROGRESS NOTES
The patient had no further bleeding last night.  She had a bowel movement with old blood, but no new bleeding.    Vitals:    07/24/17 0515 07/24/17 0530 07/24/17 0545 07/24/17 0600   BP:  (!) 110/54  (!) 112/57   Pulse: 98 97 91 97   Resp: 12 10 10 (!) 9   Temp:       TempSrc:       SpO2: 99% 99% 100% 99%   Weight:       Height:          ceFEPime (MAXIPIME) IVPB  1 g Intravenous Q24H     P.E.:  GEN: Not A x O x 3, NAD  HEENT: EOMI, PERRL, anicteric sclera  CV: RRR, no M/R/G  Chest: CTA B  Abdomen: soft, NTND, normoactive BS  Ext: No C/C/E. 2+ dorsalis pedis pulses B    Labs:  Recent Results (from the past 336 hour(s))   CBC auto differential    Collection Time: 07/23/17  8:03 AM   Result Value Ref Range    WBC 14.25 (H) 3.90 - 12.70 K/uL    Hemoglobin 8.6 (L) 12.0 - 16.0 g/dL    Hematocrit 24.9 (L) 37.0 - 48.5 %    Platelets 124 (L) 150 - 350 K/uL   CBC auto differential    Collection Time: 07/22/17  4:46 AM   Result Value Ref Range    WBC 8.39 3.90 - 12.70 K/uL    Hemoglobin 10.9 (L) 12.0 - 16.0 g/dL    Hematocrit 31.4 (L) 37.0 - 48.5 %    Platelets 216 150 - 350 K/uL   CBC auto differential    Collection Time: 07/21/17  6:27 AM   Result Value Ref Range    WBC 8.90 3.90 - 12.70 K/uL    Hemoglobin 13.2 12.0 - 16.0 g/dL    Hematocrit 36.8 (L) 37.0 - 48.5 %    Platelets 209 150 - 350 K/uL     CMP  Sodium   Date Value Ref Range Status   07/24/2017 136 136 - 145 mmol/L Final     Potassium   Date Value Ref Range Status   07/24/2017 4.2 3.5 - 5.1 mmol/L Final     Chloride   Date Value Ref Range Status   07/24/2017 105 95 - 110 mmol/L Final     CO2   Date Value Ref Range Status   07/24/2017 29 23 - 29 mmol/L Final     Glucose   Date Value Ref Range Status   07/24/2017 136 (H) 70 - 110 mg/dL Final     BUN, Bld   Date Value Ref Range Status   07/24/2017 43 (H) 10 - 30 mg/dL Final     Creatinine   Date Value Ref Range Status   07/24/2017 0.8 0.5 - 1.4 mg/dL Final     Calcium   Date Value Ref Range Status   07/24/2017 7.5 (L)  8.7 - 10.5 mg/dL Final     Total Protein   Date Value Ref Range Status   07/21/2017 6.7 6.0 - 8.4 g/dL Final     Albumin   Date Value Ref Range Status   07/21/2017 3.1 (L) 3.5 - 5.2 g/dL Final     Total Bilirubin   Date Value Ref Range Status   07/21/2017 1.8 (H) 0.1 - 1.0 mg/dL Final     Comment:     For infants and newborns, interpretation of results should be based  on gestational age, weight and in agreement with clinical  observations.  Premature Infant recommended reference ranges:  Up to 24 hours.............<8.0 mg/dL  Up to 48 hours............<12.0 mg/dL  3-5 days..................<15.0 mg/dL  6-29 days.................<15.0 mg/dL       Alkaline Phosphatase   Date Value Ref Range Status   07/21/2017 59 55 - 135 U/L Final     AST   Date Value Ref Range Status   07/21/2017 91 (H) 10 - 40 U/L Final     ALT   Date Value Ref Range Status   07/21/2017 64 (H) 10 - 44 U/L Final     Anion Gap   Date Value Ref Range Status   07/24/2017 2 (L) 8 - 16 mmol/L Final     eGFR if    Date Value Ref Range Status   07/24/2017 >60 >60 mL/min/1.73 m^2 Final     eGFR if non    Date Value Ref Range Status   07/24/2017 >60 >60 mL/min/1.73 m^2 Final     Comment:     Calculation used to obtain the estimated glomerular filtration  rate (eGFR) is the CKD-EPI equation. Since race is unknown   in our information system, the eGFR values for   -American and Non--American patients are given   for each creatinine result.         No results for input(s): INR, APTT in the last 24 hours.    Invalid input(s): PT    A/P:  The patient is a 89 year old woman with a history of HTN, pulmonary HTN, CAD, and depression presenting with metabolic encephalopathy from hyponatremia and melena.  1.  Melena - she was on pradaxa.  The patient underwent an EGD twice in which she had an esophageal ulcer and a hiatal hernia with a anabel's erosions that started bleeding.  This was treated with injection of  epinephrine, cautery, and hemoclip placement.  As it was unclear if she was continuing to bleed after the procedure, Surgery and Interventional Radiology were consulted, but she may have stopped bleeding as her H/H has been stable after she received her fourth unit of pRBCs.  A Helicobacter pylori IgG test will be checked and she can remain on the protonix drip. The patient can be transfused pRBCs as needed.

## 2017-07-24 NOTE — PROGRESS NOTES
1030  informed md mendoza of redness to perineum, excoriation. New orders received. MD aware pt is difficult to arouse except to painful stimuli, received zyprexa last pm.

## 2017-07-24 NOTE — SUBJECTIVE & OBJECTIVE
Interval History: No new issues. Patient resting.     Review of Systems   Unable to perform ROS: Acuity of condition   Constitutional: Negative for activity change.     Objective:     Vital Signs (Most Recent):  Temp: 97.8 °F (36.6 °C) (07/24/17 0315)  Pulse: 102 (07/24/17 0930)  Resp: 10 (07/24/17 0930)  BP: 125/61 (07/24/17 0930)  SpO2: 100 % (07/24/17 0930) Vital Signs (24h Range):  Temp:  [97.8 °F (36.6 °C)-100.7 °F (38.2 °C)] 97.8 °F (36.6 °C)  Pulse:  [] 102  Resp:  [8-42] 10  SpO2:  [94 %-100 %] 100 %  BP: ()/(50-87) 125/61     Weight: 66 kg (145 lb 8.1 oz)  Body mass index is 24.21 kg/m².    Intake/Output Summary (Last 24 hours) at 07/24/17 1026  Last data filed at 07/24/17 0900   Gross per 24 hour   Intake          1539.67 ml   Output                0 ml   Net          1539.67 ml      Physical Exam   Constitutional: She appears well-developed and well-nourished.   Cardiovascular: Normal rate.    Pulmonary/Chest: Effort normal.   Abdominal: Soft. There is no tenderness.   Skin: Skin is warm and dry.   Vitals reviewed.      Significant Labs:   BMP:   Recent Labs  Lab 07/24/17  0526 07/24/17 0527   GLU  --  136*   NA  --  136   K  --  4.2   CL  --  105   CO2  --  29   BUN  --  43*   CREATININE  --  0.8   CALCIUM  --  7.5*   MG 1.8  --      CBC:   Recent Labs  Lab 07/23/17  0803  07/23/17  1941 07/24/17  0015 07/24/17  0527   WBC 14.25*  --   --   --   --    HGB 8.6*  < > 10.9* 10.5* 10.2*   HCT 24.9*  < > 31.7* 30.1* 30.5*   *  --   --   --   --    < > = values in this interval not displayed.    Significant Imaging

## 2017-07-24 NOTE — ASSESSMENT & PLAN NOTE
Patient had EGD on 7/22 and 7/23. 7/22- unable to visualize secondary to blood. Transfused blood. Second EGD on 7/23- esophageal and gastric ulcers with cautery and clips placed to gastric ulcer. Received further blood on 7/23- total of 4 units since admission. Discussed case with GI. Further bleeding?- surgery/IR consult. H/H stable and will send to the floor.

## 2017-07-25 PROBLEM — I45.9 HEART BLOCK: Status: ACTIVE | Noted: 2017-07-25

## 2017-07-25 PROBLEM — R13.10 DYSPHAGIA: Status: ACTIVE | Noted: 2017-07-25

## 2017-07-25 PROBLEM — I48.0 PAROXYSMAL ATRIAL FIBRILLATION: Status: ACTIVE | Noted: 2017-07-25

## 2017-07-25 LAB
ALBUMIN SERPL BCP-MCNC: 2.1 G/DL
ALP SERPL-CCNC: 46 U/L
ALT SERPL W/O P-5'-P-CCNC: 33 U/L
ANION GAP SERPL CALC-SCNC: 4 MMOL/L
AST SERPL-CCNC: 49 U/L
BACTERIA BLD CULT: NORMAL
BACTERIA BLD CULT: NORMAL
BASOPHILS # BLD AUTO: 0 K/UL
BASOPHILS NFR BLD: 0 %
BILIRUB DIRECT SERPL-MCNC: 1.6 MG/DL
BILIRUB SERPL-MCNC: 2.3 MG/DL
BUN SERPL-MCNC: 34 MG/DL
CALCIUM SERPL-MCNC: 7.4 MG/DL
CHLORIDE SERPL-SCNC: 110 MMOL/L
CO2 SERPL-SCNC: 27 MMOL/L
CREAT SERPL-MCNC: 0.7 MG/DL
DIFFERENTIAL METHOD: ABNORMAL
EOSINOPHIL # BLD AUTO: 0 K/UL
EOSINOPHIL NFR BLD: 0.4 %
ERYTHROCYTE [DISTWIDTH] IN BLOOD BY AUTOMATED COUNT: 18.4 %
EST. GFR  (AFRICAN AMERICAN): >60 ML/MIN/1.73 M^2
EST. GFR  (NON AFRICAN AMERICAN): >60 ML/MIN/1.73 M^2
GLUCOSE SERPL-MCNC: 254 MG/DL
HCT VFR BLD AUTO: 31.7 %
HGB BLD-MCNC: 10.5 G/DL
LYMPHOCYTES # BLD AUTO: 1 K/UL
LYMPHOCYTES NFR BLD: 9.7 %
MAGNESIUM SERPL-MCNC: 1.7 MG/DL
MCH RBC QN AUTO: 31.8 PG
MCHC RBC AUTO-ENTMCNC: 33.1 G/DL
MCV RBC AUTO: 96 FL
MONOCYTES # BLD AUTO: 0.7 K/UL
MONOCYTES NFR BLD: 6.7 %
NEUTROPHILS # BLD AUTO: 8.3 K/UL
NEUTROPHILS NFR BLD: 83.2 %
PHOSPHATE SERPL-MCNC: 2 MG/DL
PLATELET # BLD AUTO: 85 K/UL
PMV BLD AUTO: 8.9 FL
POTASSIUM SERPL-SCNC: 3.9 MMOL/L
PROT SERPL-MCNC: 4.8 G/DL
RBC # BLD AUTO: 3.3 M/UL
SODIUM SERPL-SCNC: 141 MMOL/L
WBC # BLD AUTO: 9.96 K/UL

## 2017-07-25 PROCEDURE — 93010 ELECTROCARDIOGRAM REPORT: CPT | Mod: ,,, | Performed by: INTERNAL MEDICINE

## 2017-07-25 PROCEDURE — G8997 SWALLOW GOAL STATUS: HCPCS | Mod: CJ

## 2017-07-25 PROCEDURE — 93005 ELECTROCARDIOGRAM TRACING: CPT

## 2017-07-25 PROCEDURE — 25000003 PHARM REV CODE 250: Performed by: INTERNAL MEDICINE

## 2017-07-25 PROCEDURE — 11000001 HC ACUTE MED/SURG PRIVATE ROOM

## 2017-07-25 PROCEDURE — 97162 PT EVAL MOD COMPLEX 30 MIN: CPT

## 2017-07-25 PROCEDURE — G8979 MOBILITY GOAL STATUS: HCPCS | Mod: CK

## 2017-07-25 PROCEDURE — 84100 ASSAY OF PHOSPHORUS: CPT

## 2017-07-25 PROCEDURE — 83735 ASSAY OF MAGNESIUM: CPT

## 2017-07-25 PROCEDURE — G8996 SWALLOW CURRENT STATUS: HCPCS | Mod: CK

## 2017-07-25 PROCEDURE — 80076 HEPATIC FUNCTION PANEL: CPT

## 2017-07-25 PROCEDURE — 80048 BASIC METABOLIC PNL TOTAL CA: CPT

## 2017-07-25 PROCEDURE — 97165 OT EVAL LOW COMPLEX 30 MIN: CPT

## 2017-07-25 PROCEDURE — C9113 INJ PANTOPRAZOLE SODIUM, VIA: HCPCS | Performed by: INTERNAL MEDICINE

## 2017-07-25 PROCEDURE — 92610 EVALUATE SWALLOWING FUNCTION: CPT

## 2017-07-25 PROCEDURE — 85025 COMPLETE CBC W/AUTO DIFF WBC: CPT

## 2017-07-25 PROCEDURE — G8988 SELF CARE GOAL STATUS: HCPCS | Mod: CK

## 2017-07-25 PROCEDURE — G8978 MOBILITY CURRENT STATUS: HCPCS | Mod: CM

## 2017-07-25 PROCEDURE — G8987 SELF CARE CURRENT STATUS: HCPCS | Mod: CL

## 2017-07-25 PROCEDURE — 63600175 PHARM REV CODE 636 W HCPCS: Performed by: INTERNAL MEDICINE

## 2017-07-25 RX ORDER — CEFAZOLIN SODIUM 2 G/50ML
2 SOLUTION INTRAVENOUS
Status: DISCONTINUED | OUTPATIENT
Start: 2017-07-25 | End: 2017-07-25

## 2017-07-25 RX ORDER — DEXTROSE MONOHYDRATE AND SODIUM CHLORIDE 5; .9 G/100ML; G/100ML
INJECTION, SOLUTION INTRAVENOUS CONTINUOUS
Status: ACTIVE | OUTPATIENT
Start: 2017-07-25 | End: 2017-07-26

## 2017-07-25 RX ORDER — METOPROLOL TARTRATE 25 MG/1
25 TABLET, FILM COATED ORAL 2 TIMES DAILY
Status: DISCONTINUED | OUTPATIENT
Start: 2017-07-25 | End: 2017-07-26

## 2017-07-25 RX ADMIN — MICONAZOLE NITRATE: 20 CREAM TOPICAL at 09:07

## 2017-07-25 RX ADMIN — METOPROLOL TARTRATE 25 MG: 25 TABLET ORAL at 09:07

## 2017-07-25 RX ADMIN — DEXTROSE 8 MG/HR: 50 INJECTION, SOLUTION INTRAVENOUS at 09:07

## 2017-07-25 RX ADMIN — DEXTROSE 8 MG/HR: 50 INJECTION, SOLUTION INTRAVENOUS at 12:07

## 2017-07-25 NOTE — ASSESSMENT & PLAN NOTE
Will start pureed diet with nectar thickened liquids today. Will slowly taper off fluids. Speech on board. Appreciate further recs.

## 2017-07-25 NOTE — ASSESSMENT & PLAN NOTE
Unclear etiology at first given Na level was normal, but repeat level markedly different with hyponatremia of 117 and repeat confirmed to be true value. Workup with head CT and MRI negative, non-focal exam. Recent admit for urinary retention made infectious process a consideration but all cultures are negative. Is better, but I believe she may still be experience side effect from sedation given for EGD. Frequent reorientation. Blinds open during the day time

## 2017-07-25 NOTE — PT/OT/SLP EVAL
"Occupational Therapy  Evaluation    Rachael Almaguer   MRN: [id number]   Admitting Diagnosis: PUD (peptic ulcer disease)    OT Date of Treatment: 07/25/17   OT Start Time: 1518  OT Stop Time: 1543  OT Total Time (min): 25 min    Billable Minutes:  Evaluation 25 minutes (co-eval with PT)    Diagnosis: PUD (peptic ulcer disease)       Past Medical History:   Diagnosis Date    Coronary artery disease     Depression     [place]    Hepatitis C     Hypertension       History reviewed. No pertinent surgical history.    Referring physician: Dr. Hollis  Date referred to OT: 7/24/2017    General Precautions: Standard, aspiration, pureed diet, nectar thick, hearing impaired  Orthopedic Precautions: N/A  Braces: N/A    Do you have any cultural, spiritual, Advent conflicts, given your current situation?: none     Patient History:  Living Environment  Lives With: alone  Living Arrangements: house  Home Accessibility: stairs to enter home  Number of Stairs to Enter Home: 1  Stair Railings at Home: none  Transportation Available: family or friend will provide  Living Environment Comment: Family assists patient at home  Equipment Currently Used at Home: oxygen, rollator    Prior level of function:   Bed Mobility/Transfers: needs device  Grooming: needs device  Bathing: needs device  Upper Body Dressing: needs device  Lower Body Dressing: needs device  Toileting: needs device        Dominant hand: right    Subjective:  Communicated with nursing prior to session.    Chief Complaint: "my leg hurts, I can't walk."  Patient/Family stated goals: for patient to get better    Pain/Comfort  Pain Rating 1:  (unable to rate with number)  Location - Side 1: Right  Location 1: heel  Pain Addressed 1: Nurse notified (applied green pressure boots to B feet)    Objective:  Patient found with: oxygen, telemetry, PICC line    Cognitive Exam:  Oriented to: Person  Follows Commands/attention: Easily distracted and Follows one-step commands " (50%)  Communication: clear/fluent, perseverative  Memory:  Impaired STM and Poor immediate recall  Safety awareness/insight to disability: impaired  Coping skills/emotional control: Labile and Tearful    Visual/perceptual:  Intact    Physical Exam:  Postural examination/scapula alignment: Rounded shoulder and Head forward  Skin integrity: Visible skin intact, Thin and Dry  Edema: None noted     Sensation:   Intact    Upper Extremity Range of Motion:  Right Upper Extremity: WFL  Left Upper Extremity: WNL    Upper Extremity Strength:  Right Upper Extremity: WNL  Left Upper Extremity: WFL   Strength: generalized weakness    Fine motor coordination:   Intact    Gross motor coordination: WFL    Functional Mobility:  Bed Mobility:  Rolling/Turning to Left: Dependent  Rolling/Turning Right: Dependent  Scooting/Bridging: Dependent  Supine to Sit: Dependent  Sit to Supine: Dependent    Transfers:  Sit <> Stand Assistance: Total Assistance  Sit <> Stand Assistive Device: Rolling Walker    Functional Ambulation: NT    Activities of Daily Living:  Feeding Level of Assistance: Moderate assistance  UE Dressing Level of Assistance: Maximum assistance  LE Dressing Level of Assistance: Total assistance    Balance:   Static Sit: FAIR-: Maintains without assist but inconsistent   Dynamic Sit: POOR: N/A  Static Stand: 0: Needs MAXIMAL assist to maintain   Dynamic stand: 0: N/A      AM-PAC 6 CLICK ADL  How much help from another person does this patient currently need?  1 = Unable, Total/Dependent Assistance  2 = A lot, Maximum/Moderate Assistance  3 = A little, Minimum/Contact Guard/Supervision  4 = None, [place]/Independent    Putting on and taking off regular lower body clothing? : 1  Bathing (including washing, rinsing, drying)?: 1  Toileting, which includes using toilet, bedpan, or urinal? : 2  Putting on and taking off regular upper body clothing?: 2  Taking care of personal grooming such as brushing teeth?:  "2  Eating meals?: 2  Total Score: 10    AM-PAC Raw Score CMS "G-Code Modifier Level of Impairment Assistance   6 % Total / Unable   7 - 9 CM 80 - 100% Maximal Assist   10-14 CL 60 - 80% Moderate Assist   15 - 19 CK 40 - 60% Moderate Assist   20 - 22 CJ 20 - 40% Minimal Assist   23 CI 1-20% SBA / CGA   24 CH 0% Independent/ Mod I       Patient left supine with all lines intact, call button in reach, Alice notified and family present    Assessment:  Rachael Almaguer is a 89 y.o. female with a medical diagnosis of PUD (peptic ulcer disease) and presents with  independence for self-care and functional transfers..    Rehab identified problem list/impairments: Rehab identified problem list/impairments: weakness, impaired endurance, impaired self care skills, impaired functional mobilty, impaired cognition, decreased upper extremity function, decreased safety awareness, impaired coordination, impaired cardiopulmonary response to activity, impaired skin, edema    Rehab potential is fair.    Activity tolerance: Fair    Discharge recommendations: Discharge Facility/Level Of Care Needs: nursing facility, skilled     Barriers to discharge: Barriers to Discharge: Decreased caregiver support (patient lives alone but family willing to bring her home with them)    Equipment recommendations:  (TBD)     GOALS:    Occupational Therapy Goals        Problem: Occupational Therapy Goal    Goal Priority Disciplines Outcome Interventions   Occupational Therapy Goal     OT, PT/OT Ongoing (interventions implemented as appropriate)    Description:  Goals to be met by: 2017     Patient will increase functional independence with ADLs by performing:    UE Dressing with Stand-by Assistance.  LE Dressing with Minimal Assistance.  Grooming while seated with Contact Guard Assistance.  Sitting at edge of bed x10 minutes with Stand-by Assistance.  Supine to sit with Contact Guard Assistance.  Upper extremity exercise program with " assistance as needed.                      PLAN:  Patient to be seen 3 x/week to address the above listed problems via self-care/home management, therapeutic activities, therapeutic exercises  Plan of Care expires: 08/01/17  Plan of Care reviewed with: patient, family    OT G-codes  Functional Assessment Tool Used: [facility]  Score: 10  Functional Limitation: Self care  Self Care Current Status (): CL  Self Care Goal Status (): SINAN [place], MS  07/25/2017

## 2017-07-25 NOTE — PLAN OF CARE
Problem: Physical Therapy Goal  Goal: Physical Therapy Goal  Goals to be met by: 17    Patient will increase functional independence with mobility by performin. Supine to sit with Moderate Assistance  2. Sit to stand transfer with Moderate Assistance  3. Gait assess when able  4. Lower extremity exercise program x30 reps per handout, with assistance as needed      Patient would continue to benefit from PT while in the hospital in order to get back to PLOF.

## 2017-07-25 NOTE — NURSING
Patient telemetry box #[id number] confirmed with Naseem MARTINEZ.  Telemetry monitor on, HR 96, A-Fib.  Patient resting comfortably.

## 2017-07-25 NOTE — PLAN OF CARE
Problem: SLP Goal  Goal: SLP Goal  Short term goals:  1) patient will tolerate puree with nectar thick liquids without evidence of aspiration, weight loss or dehydration  2) patient will utilize compensatory strategies with max assistance for safe effective po intake   Outcome: Ongoing (interventions implemented as appropriate)  Patient seen in room, family in attendance, patient asleep easily aroused to tactile stimuli as patient is very [place].  Repositioned patient for optimal safe po intake.  Patient required oral hygiene as tongue and surrounding mucosa was very dry and caked with some dried blood.  Patient tolerated well.  Some bruising noted on right anterior of tongue.  Patient presented with applesauce by spoon, required assistance with removing bolus form spoon, mild delay with a/p transport and noted some discomfort at swallowing, possibly throat was dry.  Noted very audible and visible swallow.  patient revealed strong clear voice after swallow and no outward s/s of aspiration.  Patient presented with additional spoon of applesauce and able to retrieve bolus from spoon with increased a/p transport.  Patient then presented with thin liquid by cup, required to pour bolus into anterior of oral cavity, able to seal and noted audible and visible swallow.  Patient did reveal mild gurgle after multiple swallows and required several repetitions to clear her throat secondary to [place].  Patient tolerated multiple  Spoonfuls of pudding and able to talk with cln while eating,clear voice after each swallow.  Required more oral hygiene after po intake, noted more dry flakes from mucosa easily removed.  Rubbed vaseline on lips after.  Recommend puree with nectar thick liquids with assitanc e with all meals, cup only, small bites, alternate solids and liquids  ST to follow

## 2017-07-25 NOTE — ANESTHESIA POSTPROCEDURE EVALUATION
"Anesthesia Post Evaluation    Patient: Rachael Almaguer    Procedure(s) Performed: Procedure(s) (LRB):  ESOPHAGOGASTRODUODENOSCOPY (EGD) (N/A)    Final Anesthesia Type: general  Patient location during evaluation: ICU  Patient participation: Yes- Able to Participate  Level of consciousness: awake and alert and confused  Post-procedure vital signs: reviewed and stable  Pain management: adequate  Airway patency: patent  PONV status at discharge: No PONV  Anesthetic complications: no      Cardiovascular status: blood pressure returned to baseline and hemodynamically stable  Respiratory status: unassisted  Hydration status: euvolemic  Follow-up not needed.        Visit Vitals  BP (!) 102/57 (BP Location: Left arm, Patient Position: Lying, BP Method: Automatic)   Pulse 87   Temp 36.6 °C (97.9 °F) (Oral)   Resp 20   Ht 5' 5" (1.651 m)   Wt 66 kg (145 lb 8.1 oz)   SpO2 95%   Breastfeeding? No   BMI 24.21 kg/m²       Pain/Jennifer Score: Pain Assessment Performed: Yes (7/24/2017  7:30 PM)  Presence of Pain: non-verbal indicators absent (7/24/2017  6:00 PM)      "

## 2017-07-25 NOTE — PLAN OF CARE
Problem: Patient Care Overview  Goal: Plan of Care Review  Outcome: Ongoing (interventions implemented as appropriate)  Patient slept throughout the night; yet easily arousable.  No sign of pain.  Patient remains fall free.  Patient vitals wnl; hear rate sinus tachacy.    Patient has bruising on bilateral arms, and bruising on left arm shows weeping of serosanguinous fluid. Patient's family is at the bedside; left shortly after change of shift.  Will continue to monitor patient's heart rate and rhythm, pain, respiratory effort and both verbal and nonverbal signs of pain.

## 2017-07-25 NOTE — CONSULTS
[facility]  Cardiology  Consult Note    Patient Name: Rachael Almaguer  MRN: [id number]  Admission Date: 7/20/2017  Hospital Length of Stay: 5 days  Code Status: DNR   Attending Provider: Marisela Hollis MD   Consulting Provider: Russ Bass NP  Primary Care Physician: Boris Carmichael MD  Principal Problem:PUD (peptic ulcer disease)    Patient information was obtained from past medical records and ER records.     Inpatient consult to Cardiology  Consult performed by: RUSS BASS  Consult ordered by: BASILIO LENZ  Reason for consult: atrial fibrillation        Subjective:     Chief Complaint:  Atrial fibrillation     HPI: Ms. Almaguer is a 89 year old female patient known to [facility] Cardiology service with chronic atrial fibrillation, HTN, HFpEF who presented to clinic last week with altered mental status and with visual hallucinations. Sent to ER for evaluation. Found with metabolic encephalopathy. Also with large GI bleed, duodenal ulcer, hiatal hernia. Required transfusion. Gradual decline throughout hospitalization, now with limited interaction, more somnolent, and with no po intake. Palliative care is involved and brief discussions with family re: hospice. Asked by hospitalist to give recommendations regarding atrial fibrillation with RVR yesterday evening.     Past Medical History:   Diagnosis Date    Coronary artery disease     Depression     ON [place]    Hepatitis C     Hypertension        History reviewed. No pertinent surgical history.    Review of patient's allergies indicates:   Allergen Reactions    Aspirin     Xanax [alprazolam]        No current facility-administered medications on file prior to encounter.      Current Outpatient Prescriptions on File Prior to Encounter   Medication Sig    alendronate (FOSAMAX) 10 MG Tab Take 5 mg by mouth once daily.    busPIRone (BUSPAR) 10 MG tablet Take 10 mg by mouth 2 (two) times daily.     dabigatran etexilate  "(PRADAXA) 75 mg Cap Take 75 mg by mouth 2 (two) times daily. "Do NOT break, chew, or open capsules."    furosemide (LASIX) 20 MG tablet Take 1 tablet (20 mg total) by mouth 2 (two) times daily.    lisinopril 10 MG tablet Take 1 tablet (10 mg total) by mouth once daily.    metoprolol tartrate (LOPRESSOR) 50 MG tablet Take 50 mg by mouth 2 (two) times daily.    mirtazapine (REMERON) 15 MG tablet Take 15 mg by mouth every evening.    MV, MIN #36/IRON,CARBONYL/FA (GERITOL COMPLETE ORAL) Take by mouth once daily.     acetaminophen (TYLENOL) 325 MG tablet Take 325 mg by mouth Daily.     Family History     None        Social History Main Topics    Smoking status: Former Smoker    Smokeless tobacco: Never Used    Alcohol use No    Drug use: No    Sexual activity: Not Currently     Review of Systems   Unable to perform ROS: patient nonverbal     Objective:     Vital Signs (Most Recent):  Temp: 98.3 °F (36.8 °C) (07/25/17 0820)  Pulse: 89 (07/25/17 0820)  Resp: 20 (07/25/17 0820)  BP: 112/60 (07/25/17 0820)  SpO2: 97 % (07/25/17 0820) Vital Signs (24h Range):  Temp:  [97.3 °F (36.3 °C)-98.4 °F (36.9 °C)] 98.3 °F (36.8 °C)  Pulse:  [] 89  Resp:  [11-20] 20  SpO2:  [95 %-99 %] 97 %  BP: (100-133)/(54-78) 112/60     Weight: 66 kg (145 lb 8.1 oz)  Body mass index is 24.21 kg/m².    SpO2: 97 %  O2 Device (Oxygen Therapy): nasal cannula      Intake/Output Summary (Last 24 hours) at 07/25/17 1258  Last data filed at 07/25/17 0820   Gross per 24 hour   Intake              110 ml   Output                0 ml   Net              110 ml       Lines/Drains/Airways     Peripherally Inserted Central Catheter Line                 PICC Double Lumen 07/22/17 3 days                Physical Exam   Constitutional:   Elderly, chronically ill appearing   Neck: No JVD present. No tracheal deviation present. No thyromegaly present.   Cardiovascular:   Atrial fibrillation with ventricular rates 100-119   Pulmonary/Chest: She has " wheezes. She has rales.   Abdominal: Soft.   Musculoskeletal: Normal range of motion.   Neurological:   Awakens with stimulation; attempts to speak    Skin: Skin is warm.   cool       Significant Labs:   BMP:   Recent Labs  Lab 07/23/17  1336 07/24/17  0526 07/24/17  0527 07/25/17  0323   GLU  --   --  136* 254*   NA  --   --  136 141   K  --   --  4.2 3.9   CL  --   --  105 110   CO2  --   --  29 27   BUN  --   --  43* 34*   CREATININE  --   --  0.8 0.7   CALCIUM  --   --  7.5* 7.4*   MG 1.0* 1.8  --  1.7   , CMP   Recent Labs  Lab 07/24/17  0527 07/25/17  0323    141   K 4.2 3.9    110   CO2 29 27   * 254*   BUN 43* 34*   CREATININE 0.8 0.7   CALCIUM 7.5* 7.4*   PROT  --  4.8*   ALBUMIN  --  2.1*   BILITOT  --  2.3*   ALKPHOS  --  46*   AST  --  49*   ALT  --  33   ANIONGAP 2* 4*   ESTGFRAFRICA >60 >60   EGFRNONAA >60 >60   , CBC   Recent Labs  Lab 07/24/17  0527 07/24/17  1140 07/25/17  0323   WBC  --   --  9.96   HGB 10.2* 10.9* 10.5*   HCT 30.5* 32.2* 31.7*   PLT  --   --  85*   , INR No results for input(s): INR, PROTIME in the last 48 hours. and Troponin No results for input(s): TROPONINI in the last 48 hours.    Significant Imaging: Echocardiogram:   2D echo with color flow doppler:   Results for orders placed or performed during the hospital encounter of 07/13/17   2D echo with color flow doppler   Result Value Ref Range    EF 65 55 - 65    Mitral Valve Regurgitation MODERATE (A)     Diastolic Dysfunction Yes (A)     Aortic Valve Regurgitation MODERATE (A)     Est. PA Systolic Pressure 55.06 (A)     Mitral Valve Mobility PROLAPSE     Tricuspid Valve Regurgitation MODERATE (A)      Assessment and Plan:     Active Diagnoses:    Diagnosis Date Noted POA    PRINCIPAL PROBLEM:  PUD (peptic ulcer disease) [K27.9] 07/23/2017 Yes    Heart block [I45.9] 07/25/2017 Yes    Debility [R53.81] 07/24/2017 No    DNR (do not resuscitate) [Z66] 07/21/2017 Yes    Metabolic encephalopathy [G93.41]  07/20/2017 Yes    Urinary retention [R33.9] 07/13/2017 Yes    Chronic diastolic congestive heart failure [I50.32] 01/02/2017 Yes     Chronic      Problems Resolved During this Admission:    Diagnosis Date Noted Date Resolved POA    GIB (gastrointestinal bleeding) [K92.2] 07/22/2017 07/24/2017 Yes    Acute blood loss anemia [D62] 07/20/2017 07/24/2017 Yes    Acute renal failure superimposed on stage 3 chronic kidney disease [N17.9, N18.3] 07/20/2017 07/24/2017 Yes    Hyponatremia [E87.1] 12/31/2016 07/24/2017 Yes       VTE Risk Mitigation         Ordered     Medium Risk of VTE  Once      07/21/17 0149     Reason for No Pharmacological VTE Prophylaxis  Once      07/21/17 0149        Ms. Almaguer is a frail, elderly 89 year old with chronic atrial fibrillation, HFpEF, large GI bleed s/p EGD, clipping admitted initially with AMS/visual hallucinations, metabolic encephalopathy, and with steady gradual decline. As she has multiple comorbidities and has very poor prognosis would prefer conservative measures. PRN IV metoprolol for rates consistently above 120ppm. Restart po metoprolol should patient begin tolerating po. Agree Palliative Care, comfort measures only should be made for this patient.     Patient examined with Dr. Aroldo Bautista whose assessment and plan are outlined above.  Thank you for your consult.     Vicky Noriega NP  Cardiology   [facility]

## 2017-07-25 NOTE — PROGRESS NOTES
The patient had no further bleeding for the last two days.    Vitals:    07/24/17 1830 07/24/17 2030 07/24/17 2328 07/25/17 0309   BP: (!) 108/58 107/63 115/78 (!) 102/57   BP Location: Left arm Left arm Left arm Left arm   Patient Position: Lying Lying Lying Lying   BP Method: Automatic Automatic Automatic Automatic   Pulse: 89 98 107 87   Resp: 18 20 20 20   Temp: 97.9 °F (36.6 °C) 97.3 °F (36.3 °C) 97.8 °F (36.6 °C) 97.9 °F (36.6 °C)   TempSrc: Oral Oral Oral Oral   SpO2: 96% 97% 96% 95%   Weight:       Height:          miconazole   Topical (Top) BID     P.E.:  GEN: Not A x O x 3, NAD  HEENT: EOMI, PERRL, anicteric sclera  CV: RRR, no M/R/G  Chest: CTA B  Abdomen: soft, NTND, normoactive BS  Ext: No C/C/E. 2+ dorsalis pedis pulses B    Labs:  Recent Results (from the past 336 hour(s))   CBC auto differential    Collection Time: 07/25/17  3:23 AM   Result Value Ref Range    WBC 9.96 3.90 - 12.70 K/uL    Hemoglobin 10.5 (L) 12.0 - 16.0 g/dL    Hematocrit 31.7 (L) 37.0 - 48.5 %    Platelets 85 (L) 150 - 350 K/uL   CBC auto differential    Collection Time: 07/23/17  8:03 AM   Result Value Ref Range    WBC 14.25 (H) 3.90 - 12.70 K/uL    Hemoglobin 8.6 (L) 12.0 - 16.0 g/dL    Hematocrit 24.9 (L) 37.0 - 48.5 %    Platelets 124 (L) 150 - 350 K/uL   CBC auto differential    Collection Time: 07/22/17  4:46 AM   Result Value Ref Range    WBC 8.39 3.90 - 12.70 K/uL    Hemoglobin 10.9 (L) 12.0 - 16.0 g/dL    Hematocrit 31.4 (L) 37.0 - 48.5 %    Platelets 216 150 - 350 K/uL     CMP  Sodium   Date Value Ref Range Status   07/25/2017 141 136 - 145 mmol/L Final     Potassium   Date Value Ref Range Status   07/25/2017 3.9 3.5 - 5.1 mmol/L Final     Chloride   Date Value Ref Range Status   07/25/2017 110 95 - 110 mmol/L Final     CO2   Date Value Ref Range Status   07/25/2017 27 23 - 29 mmol/L Final     Glucose   Date Value Ref Range Status   07/25/2017 254 (H) 70 - 110 mg/dL Final     BUN, Bld   Date Value Ref Range Status    07/25/2017 34 (H) 10 - 30 mg/dL Final     Creatinine   Date Value Ref Range Status   07/25/2017 0.7 0.5 - 1.4 mg/dL Final     Calcium   Date Value Ref Range Status   07/25/2017 7.4 (L) 8.7 - 10.5 mg/dL Final     Total Protein   Date Value Ref Range Status   07/25/2017 4.8 (L) 6.0 - 8.4 g/dL Final     Albumin   Date Value Ref Range Status   07/25/2017 2.1 (L) 3.5 - 5.2 g/dL Final     Total Bilirubin   Date Value Ref Range Status   07/25/2017 2.3 (H) 0.1 - 1.0 mg/dL Final     Comment:     For infants and newborns, interpretation of results should be based  on gestational age, weight and in agreement with clinical  observations.  Premature Infant recommended reference ranges:  Up to 24 hours.............<8.0 mg/dL  Up to 48 hours............<12.0 mg/dL  3-5 days..................<15.0 mg/dL  6-29 days.................<15.0 mg/dL       Alkaline Phosphatase   Date Value Ref Range Status   07/25/2017 46 (L) 55 - 135 U/L Final     AST   Date Value Ref Range Status   07/25/2017 49 (H) 10 - 40 U/L Final     ALT   Date Value Ref Range Status   07/25/2017 33 10 - 44 U/L Final     Anion Gap   Date Value Ref Range Status   07/25/2017 4 (L) 8 - 16 mmol/L Final     eGFR if    Date Value Ref Range Status   07/25/2017 >60 >60 mL/min/1.73 m^2 Final     eGFR if non    Date Value Ref Range Status   07/25/2017 >60 >60 mL/min/1.73 m^2 Final     Comment:     Calculation used to obtain the estimated glomerular filtration  rate (eGFR) is the CKD-EPI equation. Since race is unknown   in our information system, the eGFR values for   -American and Non--American patients are given   for each creatinine result.         No results for input(s): INR, APTT in the last 24 hours.    Invalid input(s): PT    A/P:  The patient is a 89 year old woman with a history of HTN, pulmonary HTN, CAD, and depression presenting with metabolic encephalopathy from hyponatremia and melena.  1.  Melena - she was on  pradaxa.  The patient underwent an EGD twice in which she had an esophageal ulcer and a hiatal hernia with a anabel's erosions that started bleeding.  This was treated with injection of epinephrine, cautery, and hemoclip placement.  As it was unclear if she was continuing to bleed after the procedure, Surgery and Interventional Radiology were consulted, but she stopped bleeding.  Her H/H has been stable after she received her fourth unit of pRBCs.  A Helicobacter pylori IgG test will be checked and she can remain on the protonix drip for one more day then this can be changed to protonix 40 mg po Q12H. The patient can be transfused pRBCs as needed.  Palliative Care has been consulted.  Apparently, she was independent prior to arrival, but now, she is not truly able to communicate.  The patient has a guarded longterm prognosis.  Gastroenterology has nothing additional to add.  Please call GI if she rebleeds.

## 2017-07-25 NOTE — PLAN OF CARE
Problem: Occupational Therapy Goal  Goal: Occupational Therapy Goal  Goals to be met by: 8/8/2017     Patient will increase functional independence with ADLs by performing:    UE Dressing with Stand-by Assistance.  LE Dressing with Minimal Assistance.  Grooming while seated with Contact Guard Assistance.  Sitting at edge of bed x10 minutes with Stand-by Assistance.  Supine to sit with Contact Guard Assistance.  Upper extremity exercise program with assistance as needed.    Outcome: Ongoing (interventions implemented as appropriate)  Patient tolerated evaluation fair, good participation.  Patient will benefit from skilled OT services to address the above mentioned goals. SINAN Snow, MS

## 2017-07-25 NOTE — PROGRESS NOTES
"[facility] Medicine  Progress Note    Patient Name: Rachael Almaguer  MRN: [id number]  Patient Class: IP- Inpatient   Admission Date: 7/20/2017  Length of Stay: 5 days  Attending Physician: Marisela Hollis MD  Primary Care Provider: Boris Carmichael MD        Subjective:     Principal Problem:PUD (peptic ulcer disease)    HPI:  Rachael Almaguer is a 89 y.o. with medical history HTN, CAD, and depression. She presented for evaluation of 5 day history of hallucinations since discharge from this hospital on Saturday. Patient was admitted for urinary retension. History taken from patients chart and patient.    Per patient, who surprisingly gives good history except for the visual hallucinations, she had been seeing people who are not there. She tells me that her 10 year old granddaughter is in the chair in the room during history taking. However, she is able to tell me that on Monday she saw her PCP who told her "you don't look good". She also saw her cardiologist (Dr. Whiting) today who told her to come to the hospital. Additionally, patient reports BLLE swelling.    Patient is found to have significant drop in H/H since 2 weeks ago was 14.1/41.0 now 10.5/30.9; dehydration with BUN/CR/GFR 57/1.8/24 compared to Jan her renal functions were normal.            Hospital Course:  Ms. Almaguer was admitted with AMS of uncertain etiology in the beginning, but quickly became clear after repeat BMP showed Na was 117 causing metabolic encephalopathy. Pt had negative head CT and further workup with MRI was unremarkable. Neurology following. Pt with hypovolemia causing hyponatremia and all diuretics stopped and steady correction with NS in progress with close monitoring of electrolytes. Patient was transferred to the ICU on 7/22 for acute GI bleed.  GI was consulted and took the patient to EGD the same day.  Blood was found in the entire stomach.  The patient received multiple units of blood. She continued " to bleed and the patient was taken back to EGD on 7/23. This showed non-bleeding esophageal ulcer as well as a non-bleeding gastric ulcer with cautery and clips placed.  GI recommended continued ICU monitoring.  Patient is DNR. Patient received blood again on 7/23. She received a total of 4 units of blood. The patient was very agitated on 7/23 and Zyprexa was started. The patient's H/H stabilized since 7/23 and the patient was sent to the floor on 7/24. Speech, PT/OT were consulted on 7/24. Speech recommended pureed with nectar thickened liquids. Restarted on metoprolol 25 mg BID for AFib. Anticoagulation on hold indefinitely. Patient and family aware that AFib increases risk for stroke when not on anticoagulation. Verbalized understanding.     Interval History: somnolent but easily arousable. Has no new complaints. Speech is slurred. Occassionally with Afib RVR. Only on PRN IV BB. Had speech eval today.     Review of Systems   HENT: Negative.    Eyes: Negative.    Respiratory: Negative.    Cardiovascular: Negative.    Gastrointestinal: Negative.    Endocrine: Negative.    Genitourinary: Negative.    Neurological: Positive for weakness.        Drowsy   Hematological: Negative.      Objective:     Vital Signs (Most Recent):  Temp: 97.9 °F (36.6 °C) (07/25/17 1305)  Pulse: (!) 112 (07/25/17 1305)  Resp: 18 (07/25/17 1305)  BP: (!) 144/65 (07/25/17 1305)  SpO2: 100 % (07/25/17 1305) Vital Signs (24h Range):  Temp:  [97.3 °F (36.3 °C)-98.3 °F (36.8 °C)] 97.9 °F (36.6 °C)  Pulse:  [] 112  Resp:  [16-20] 18  SpO2:  [95 %-100 %] 100 %  BP: (100-144)/(54-78) 144/65     Weight: 66 kg (145 lb 8.1 oz)  Body mass index is 24.21 kg/m².    Intake/Output Summary (Last 24 hours) at 07/25/17 1509  Last data filed at 07/25/17 1400   Gross per 24 hour   Intake              240 ml   Output                0 ml   Net              240 ml      Physical Exam   Constitutional: She appears well-developed.   Thin and frail.   HENT:    Head: Normocephalic.   +temporal wasting, hard of hearing.   Eyes: EOM are normal. Pupils are equal, round, and reactive to light.   Neck: Normal range of motion. Neck supple.   Cardiovascular: Normal rate and regular rhythm.    Pulmonary/Chest: Effort normal and breath sounds normal.   Abdominal: Soft. She exhibits no distension.   Musculoskeletal: Normal range of motion. She exhibits no edema.   Neurological:   Drowsy but easily arousable and redirectable. Slurred speech noted   Skin: Skin is warm and dry.   Psychiatric: Her behavior is normal. Thought content normal.       Significant Labs: All pertinent labs within the past 24 hours have been reviewed.    Significant Imaging: I have reviewed all pertinent imaging results/findings within the past 24 hours.  I have reviewed and interpreted all pertinent imaging results/findings within the past 24 hours.    Assessment/Plan:      * PUD (peptic ulcer disease)    Patient had EGD on 7/22 and 7/23. 7/22- unable to visualize secondary to blood. Transfused blood. Second EGD on 7/23- esophageal and gastric ulcers with cautery and clips placed to gastric ulcer. Received further blood on 7/23- total of 4 units since admission. Discussed case with GI. Further bleeding?- surgery/IR consult. H/H now stable. Off all anticoagulation.           Metabolic encephalopathy    Unclear etiology at first given Na level was normal, but repeat level markedly different with hyponatremia of 117 and repeat confirmed to be true value. Workup with head CT and MRI negative, non-focal exam. Recent admit for urinary retention made infectious process a consideration but all cultures are negative. Is better, but I believe she may still be experience side effect from sedation given for EGD. Frequent reorientation. Blinds open during the day time          Dysphagia    Will start pureed diet with nectar thickened liquids today. Will slowly taper off fluids. Speech on board. Appreciate further recs.            Paroxysmal atrial fibrillation    Off anticoagulation. Will start PO metoprolol today.           Debility    consulted PT/OT. Pending recs          DNR (do not resuscitate)    Reviewed with patient and family, she is DNR. Palliative care consulted for counseling and LaPOST filled out. Palliative care following.           Urinary retention    Pt was here last week for urinary retention and started on flomax. Bladder scan and straight cath if needed.        Chronic diastolic congestive heart failure    No evidence of decompensation and with likely over-diuresis just prior to admission. Continue to hold diuretics and judicious IVF as discussed above.          VTE Risk Mitigation         Ordered     Medium Risk of VTE  Once      07/21/17 0149     Reason for No Pharmacological VTE Prophylaxis  Once      07/21/17 0149        Dispo pending PT/OT eval and meeting with palliative care. Patient lives by herself and has one son who lives in [place] but cares for his wife who has dementia. Rest of family live out of state but are very supportive. If not hospice, I believe patient will need SNF. Patient nor family want NH placement.      Marisela Mabry MD  Department of Hospital Medicine   [facility]

## 2017-07-25 NOTE — SUBJECTIVE & OBJECTIVE
Interval History: somnolent but easily arousable. Has no new complaints. Speech is slurred. Occassionally with Afib RVR. Only on PRN IV BB. Had speech eval today.     Review of Systems   HENT: Negative.    Eyes: Negative.    Respiratory: Negative.    Cardiovascular: Negative.    Gastrointestinal: Negative.    Endocrine: Negative.    Genitourinary: Negative.    Neurological: Positive for weakness.        Drowsy   Hematological: Negative.      Objective:     Vital Signs (Most Recent):  Temp: 97.9 °F (36.6 °C) (07/25/17 1305)  Pulse: (!) 112 (07/25/17 1305)  Resp: 18 (07/25/17 1305)  BP: (!) 144/65 (07/25/17 1305)  SpO2: 100 % (07/25/17 1305) Vital Signs (24h Range):  Temp:  [97.3 °F (36.3 °C)-98.3 °F (36.8 °C)] 97.9 °F (36.6 °C)  Pulse:  [] 112  Resp:  [16-20] 18  SpO2:  [95 %-100 %] 100 %  BP: (100-144)/(54-78) 144/65     Weight: 66 kg (145 lb 8.1 oz)  Body mass index is 24.21 kg/m².    Intake/Output Summary (Last 24 hours) at 07/25/17 1509  Last data filed at 07/25/17 1400   Gross per 24 hour   Intake              240 ml   Output                0 ml   Net              240 ml      Physical Exam   Constitutional: She appears well-developed.   Thin and frail.   HENT:   Head: Normocephalic.   +temporal wasting, hard of hearing.   Eyes: EOM are normal. Pupils are equal, round, and reactive to light.   Neck: Normal range of motion. Neck supple.   Cardiovascular: Normal rate and regular rhythm.    Pulmonary/Chest: Effort normal and breath sounds normal.   Abdominal: Soft. She exhibits no distension.   Musculoskeletal: Normal range of motion. She exhibits no edema.   Neurological:   Drowsy but easily arousable and redirectable. Slurred speech noted   Skin: Skin is warm and dry.   Psychiatric: Her behavior is normal. Thought content normal.       Significant Labs: All pertinent labs within the past 24 hours have been reviewed.    Significant Imaging: I have reviewed all pertinent imaging results/findings within the  past 24 hours.  I have reviewed and interpreted all pertinent imaging results/findings within the past 24 hours.

## 2017-07-25 NOTE — NURSING
Anna in telemetry called to inform that patient had rapid a-fib, with HR in the 120's.  Dr. Mabry notified, new orders for PO metoprolol BID.  Will continue to monitor patient HR and rhythm.

## 2017-07-25 NOTE — PT/OT/SLP EVAL
Physical Therapy  Evaluation    Rachael Almaguer   MRN: [id number]   Admitting Diagnosis: PUD (peptic ulcer disease)    PT Received On: 07/25/17  PT Start Time: 1518     PT Stop Time: 1540    PT Total Time (min): 22 min       Billable Minutes:  Evaluation  22 with OT present     Diagnosis: PUD (peptic ulcer disease)    Past Medical History:   Diagnosis Date    Coronary artery disease     Depression     [place]    Hepatitis C     Hypertension       History reviewed. No pertinent surgical history.    Referring physician: Jazmine  Date referred to PT: 7/25/17    General Precautions: Standard, aspiration, pureed diet, nectar thick, hearing impaired, fall  Orthopedic Precautions: N/A   Braces: N/A       Patient History:  Lives With: alone  Living Arrangements: house  Home Accessibility: stairs to enter home  Number of Stairs to Enter Home: 1  Stair Railings at Home: none  Living Environment Comment: Family assists patient at home.   Equipment Currently Used at Home: oxygen, rollator    Previous Level of Function:  Ambulation Skills: needs device  Transfer Skills: needs device    Subjective:  Communicated with nurse Jenkins prior to session.  Patient agreeable to participate in PT session after encouragement from daughter in law.   Chief Complaint: Fear of falling.   Patient goals: To walk.     Pain/Comfort  Pain Rating : unable to rate with number   Location - Side : Right  Location : heel  Pain Addressed : Nurse notified (applied green pressure relief boots to bilateral feet at end of session )    Objective:   Patient found with: oxygen, telemetry, PICC line     Cognitive Exam:  Oriented to: Person    Follows Commands/attention: Follows one-step commands  Communication: clear/fluent  Safety awareness/insight to disability: impaired    Physical Exam:  Postural examination/scapula alignment: Rounded shoulder and Head forward    Skin integrity: skin tear on left and right forearm   Edema: Mild B UE's    Lower  Extremity Range of Motion:  Right Lower Extremity: PROM WFL  Left Lower Extremity: PROM WFL    Lower Extremity Strength:  Right Lower Extremity: 3/5  Left Lower Extremity: 3/5     Functional Mobility:  Bed Mobility:  Supine to Sit: Dependent, With assist of 2  Sit to Supine: Dependent, With assist of  2    Transfers:  Sit <> Stand Assistance: Dependent  Sit <> Stand Assistive Device: Rolling Walker    Gait:   Gait Distance:  (unable to perform )    Balance:   Static Sit: FAIR-: Maintains without assist but inconsistent   Dynamic Sit: POOR: N/A  Static Stand: 0: Needs MAXIMAL assist to maintain     AM-PAC 6 CLICK MOBILITY  How much help from another person does this patient currently need?   1 = Unable, Total/Dependent Assistance  2 = A lot, Maximum/Moderate Assistance  3 = A little, Minimum/Contact Guard/Supervision  4 = None, Modified [place]/Independent    Turning over in bed (including adjusting bedclothes, sheets and blankets)?: 2  Sitting down on and standing up from a chair with arms (e.g., wheelchair, bedside commode, etc.): 2  Moving from lying on back to sitting on the side of the bed?: 2  Moving to and from a bed to a chair (including a wheelchair)?: 1  Need to walk in hospital room?: 1  Climbing 3-5 steps with a railing?: 1  Total Score: 9     AM-PAC Raw Score CMS G-Code Modifier Level of Impairment Assistance   6 % Total / Unable   7 - 9 CM 80 - 100% Maximal Assist   10 - 14 CL 60 - 80% Moderate Assist   15 - 19 CK 40 - 60% Moderate Assist   20 - 22 CJ 20 - 40% Minimal Assist   23 CI 1-20% SBA / CGA   24 CH 0% Independent/ Mod I     Patient left supine with all lines intact, call button in reach, nurse Alice notified and son and daughter in law  present.    Assessment:   Rachael Almaguer is a 89 y.o. female with a medical diagnosis of PUD (peptic ulcer disease) and presents with decreased strength and impaired balance due to prolonged hospital stay. She would continue to benefit from PT  while in the hospital in order to address deficits listed below and get patient back to PLOF.     Rehab identified problem list/impairments: Rehab identified problem list/impairments: weakness, impaired endurance, impaired functional mobilty, gait instability, impaired balance, decreased lower extremity function, decreased safety awareness, impaired skin, impaired cardiopulmonary response to activity    Rehab potential is fair.    Activity tolerance: Poor    Discharge recommendations: Discharge Facility/Level Of Care Needs: nursing facility, skilled     Barriers to discharge: Barriers to Discharge: Decreased caregiver support, Other (Comment) (patient lives alone but family would like for her to move in with them )    Equipment recommendations: Equipment Needed After Discharge:  (TBD based on progress)     GOALS:    Physical Therapy Goals        Problem: Physical Therapy Goal    Goal Priority Disciplines Outcome Goal Variances Interventions   Physical Therapy Goal     PT/OT, PT      Description:  Goals to be met by: 17    Patient will increase functional independence with mobility by performin. Supine to sit with Moderate Assistance  2. Sit to stand transfer with Moderate Assistance  3. Gait assess when able  4. Lower extremity exercise program x30 reps per handout, with assistance as needed                    PLAN:    Patient to be seen 6 x/week to address the above listed problems via gait training, therapeutic activities, therapeutic exercises  Plan of Care expires: 17  Plan of Care reviewed with: patient (daughter in law)    Functional Assessment Tool Used: AM PAC   Score: 9  Functional Limitation: Mobility: Walking and moving around  Mobility: Walking and Moving Around Current Status (): CM  Mobility: Walking and Moving Around Goal Status (): MICHAEL Walton, PT, MOT  2017

## 2017-07-25 NOTE — ASSESSMENT & PLAN NOTE
Patient had EGD on 7/22 and 7/23. 7/22- unable to visualize secondary to blood. Transfused blood. Second EGD on 7/23- esophageal and gastric ulcers with cautery and clips placed to gastric ulcer. Received further blood on 7/23- total of 4 units since admission. Discussed case with GI. Further bleeding?- surgery/IR consult. H/H now stable. Off all anticoagulation.

## 2017-07-25 NOTE — PLAN OF CARE
Problem: Patient Care Overview  Goal: Plan of Care Review  Outcome: Ongoing (interventions implemented as appropriate)  Patient is alert and oriented to person and situation.  Patient is tolerating pureed foods and drinks well, and needs full assistance when eating.  Patient is incontinent of urine and stool, and incontinence pads are being changed and kept dry.  Patient states no pain, no SOB, and states she is feeling much better today.  HR and rhythm have been tachy, and metoprolol ordered. Patient's skin continues to bruise, and skin integrity is being maintained by keeping tubing and devices free from skin, and frequent weight shift assistance. Will continue to monitor patient for pain, safety, skin integrity, and heart rate and rhythym.

## 2017-07-26 LAB
ANION GAP SERPL CALC-SCNC: 4 MMOL/L
BASOPHILS # BLD AUTO: 0.01 K/UL
BASOPHILS NFR BLD: 0.1 %
BLD PROD TYP BPU: NORMAL
BLD PROD TYP BPU: NORMAL
BLOOD UNIT EXPIRATION DATE: NORMAL
BLOOD UNIT EXPIRATION DATE: NORMAL
BLOOD UNIT TYPE CODE: 5100
BLOOD UNIT TYPE CODE: 5100
BLOOD UNIT TYPE: NORMAL
BLOOD UNIT TYPE: NORMAL
BUN SERPL-MCNC: 24 MG/DL
CALCIUM SERPL-MCNC: 7.7 MG/DL
CHLORIDE SERPL-SCNC: 108 MMOL/L
CO2 SERPL-SCNC: 28 MMOL/L
CODING SYSTEM: NORMAL
CODING SYSTEM: NORMAL
CREAT SERPL-MCNC: 0.7 MG/DL
DIFFERENTIAL METHOD: ABNORMAL
DISPENSE STATUS: NORMAL
DISPENSE STATUS: NORMAL
EOSINOPHIL # BLD AUTO: 0.1 K/UL
EOSINOPHIL NFR BLD: 1 %
ERYTHROCYTE [DISTWIDTH] IN BLOOD BY AUTOMATED COUNT: 18.9 %
EST. GFR  (AFRICAN AMERICAN): >60 ML/MIN/1.73 M^2
EST. GFR  (NON AFRICAN AMERICAN): >60 ML/MIN/1.73 M^2
GLUCOSE SERPL-MCNC: 113 MG/DL
H PYLORI IGG SERPL QL IA: NEGATIVE
HCT VFR BLD AUTO: 31.2 %
HGB BLD-MCNC: 10.1 G/DL
LYMPHOCYTES # BLD AUTO: 0.8 K/UL
LYMPHOCYTES NFR BLD: 7.7 %
MAGNESIUM SERPL-MCNC: 1.5 MG/DL
MCH RBC QN AUTO: 32.4 PG
MCHC RBC AUTO-ENTMCNC: 32.4 G/DL
MCV RBC AUTO: 100 FL
MONOCYTES # BLD AUTO: 1 K/UL
MONOCYTES NFR BLD: 10.2 %
NEUTROPHILS # BLD AUTO: 8 K/UL
NEUTROPHILS NFR BLD: 80.6 %
PHOSPHATE SERPL-MCNC: 1.3 MG/DL
PLATELET # BLD AUTO: 96 K/UL
PMV BLD AUTO: 9.4 FL
POTASSIUM SERPL-SCNC: 4.1 MMOL/L
RBC # BLD AUTO: 3.12 M/UL
SODIUM SERPL-SCNC: 140 MMOL/L
TRANS ERYTHROCYTES VOL PATIENT: NORMAL ML
TRANS ERYTHROCYTES VOL PATIENT: NORMAL ML
WBC # BLD AUTO: 9.91 K/UL

## 2017-07-26 PROCEDURE — 11000001 HC ACUTE MED/SURG PRIVATE ROOM

## 2017-07-26 PROCEDURE — 86580 TB INTRADERMAL TEST: CPT | Performed by: INTERNAL MEDICINE

## 2017-07-26 PROCEDURE — C9113 INJ PANTOPRAZOLE SODIUM, VIA: HCPCS | Performed by: INTERNAL MEDICINE

## 2017-07-26 PROCEDURE — 97530 THERAPEUTIC ACTIVITIES: CPT

## 2017-07-26 PROCEDURE — 92526 ORAL FUNCTION THERAPY: CPT

## 2017-07-26 PROCEDURE — 25000003 PHARM REV CODE 250: Performed by: INTERNAL MEDICINE

## 2017-07-26 PROCEDURE — 83735 ASSAY OF MAGNESIUM: CPT

## 2017-07-26 PROCEDURE — 80048 BASIC METABOLIC PNL TOTAL CA: CPT

## 2017-07-26 PROCEDURE — 85025 COMPLETE CBC W/AUTO DIFF WBC: CPT

## 2017-07-26 PROCEDURE — 36415 COLL VENOUS BLD VENIPUNCTURE: CPT

## 2017-07-26 PROCEDURE — 63600175 PHARM REV CODE 636 W HCPCS: Performed by: INTERNAL MEDICINE

## 2017-07-26 PROCEDURE — 84100 ASSAY OF PHOSPHORUS: CPT

## 2017-07-26 RX ORDER — SODIUM,POTASSIUM PHOSPHATES 280-250MG
1 POWDER IN PACKET (EA) ORAL
Status: DISPENSED | OUTPATIENT
Start: 2017-07-26 | End: 2017-07-27

## 2017-07-26 RX ORDER — PANTOPRAZOLE SODIUM 40 MG/10ML
40 INJECTION, POWDER, LYOPHILIZED, FOR SOLUTION INTRAVENOUS 2 TIMES DAILY
Status: DISCONTINUED | OUTPATIENT
Start: 2017-07-26 | End: 2017-08-02 | Stop reason: HOSPADM

## 2017-07-26 RX ORDER — DEXTROSE MONOHYDRATE AND SODIUM CHLORIDE 5; .9 G/100ML; G/100ML
INJECTION, SOLUTION INTRAVENOUS CONTINUOUS
Status: ACTIVE | OUTPATIENT
Start: 2017-07-26 | End: 2017-07-27

## 2017-07-26 RX ORDER — MAGNESIUM SULFATE HEPTAHYDRATE 40 MG/ML
2 INJECTION, SOLUTION INTRAVENOUS ONCE
Status: COMPLETED | OUTPATIENT
Start: 2017-07-26 | End: 2017-07-26

## 2017-07-26 RX ORDER — RAMELTEON 8 MG/1
8 TABLET ORAL NIGHTLY
Status: DISCONTINUED | OUTPATIENT
Start: 2017-07-26 | End: 2017-07-27

## 2017-07-26 RX ORDER — METOPROLOL TARTRATE 50 MG/1
50 TABLET ORAL 2 TIMES DAILY
Status: DISCONTINUED | OUTPATIENT
Start: 2017-07-26 | End: 2017-08-02 | Stop reason: HOSPADM

## 2017-07-26 RX ADMIN — MAGNESIUM SULFATE HEPTAHYDRATE 2 G: 40 INJECTION, SOLUTION INTRAVENOUS at 05:07

## 2017-07-26 RX ADMIN — DEXTROSE AND SODIUM CHLORIDE: 5; .9 INJECTION, SOLUTION INTRAVENOUS at 01:07

## 2017-07-26 RX ADMIN — DEXTROSE AND SODIUM CHLORIDE: 5; .9 INJECTION, SOLUTION INTRAVENOUS at 09:07

## 2017-07-26 RX ADMIN — METOPROLOL TARTRATE 50 MG: 50 TABLET ORAL at 09:07

## 2017-07-26 RX ADMIN — PANTOPRAZOLE SODIUM 40 MG: 40 INJECTION, POWDER, FOR SOLUTION INTRAVENOUS at 09:07

## 2017-07-26 RX ADMIN — Medication 5 UNITS: at 09:07

## 2017-07-26 RX ADMIN — METOPROLOL TARTRATE 25 MG: 25 TABLET ORAL at 09:07

## 2017-07-26 RX ADMIN — POTASSIUM & SODIUM PHOSPHATES POWDER PACK 280-160-250 MG 1 PACKET: 280-160-250 PACK at 05:07

## 2017-07-26 RX ADMIN — RAMELTEON 8 MG: 8 TABLET, FILM COATED ORAL at 09:07

## 2017-07-26 RX ADMIN — DEXTROSE 8 MG/HR: 50 INJECTION, SOLUTION INTRAVENOUS at 09:07

## 2017-07-26 RX ADMIN — DEXTROSE 8 MG/HR: 50 INJECTION, SOLUTION INTRAVENOUS at 04:07

## 2017-07-26 RX ADMIN — MICONAZOLE NITRATE: 20 CREAM TOPICAL at 10:07

## 2017-07-26 RX ADMIN — DEXTROSE 8 MG/HR: 50 INJECTION, SOLUTION INTRAVENOUS at 01:07

## 2017-07-26 RX ADMIN — POTASSIUM & SODIUM PHOSPHATES POWDER PACK 280-160-250 MG 1 PACKET: 280-160-250 PACK at 09:07

## 2017-07-26 NOTE — ASSESSMENT & PLAN NOTE
Unclear etiology at first given Na level was normal, but repeat level markedly different with hyponatremia of 117 and repeat confirmed to be true value. Workup with head CT and MRI negative, non-focal exam. Recent admit for urinary retention made infectious process a consideration but all cultures are negative. Is better, but I believe she may still be experience side effect from sedation given for EGD. Patient not sleeping well at nights but sleeping all day. Will get up to chair for meals and up with PT. Will give ramelteon tonight so she can sleep tonight and hopefully be more awake tomorrow. Frequent reorientation. Blinds open during the day time

## 2017-07-26 NOTE — PLAN OF CARE
Problem: Physical Therapy Goal  Goal: Physical Therapy Goal  Goals to be met by: 17    Patient will increase functional independence with mobility by performin. Supine to sit with Moderate Assistance  2. Sit to stand transfer with Moderate Assistance  3. Gait x50ft with RW and moderate assistance  4. Lower extremity exercise program x30 reps per handout, with assistance as needed     Outcome: Ongoing (interventions implemented as appropriate)    Patient required increased encouragement to sit in bedside chair today.

## 2017-07-26 NOTE — PLAN OF CARE
Problem: Occupational Therapy Goal  Goal: Occupational Therapy Goal  Goals to be met by: 8/8/2017     Patient will increase functional independence with ADLs by performing:    UE Dressing with Stand-by Assistance.  LE Dressing with Minimal Assistance.  Grooming while seated with Contact Guard Assistance.  Sitting at edge of bed x10 minutes with Stand-by Assistance.  Supine to sit with Contact Guard Assistance.  Upper extremity exercise program with assistance as needed.     Outcome: Ongoing (interventions implemented as appropriate)  Patient reluctant to participate in therapy requiring increased time to complete transfer. SINAN Snow, MS

## 2017-07-26 NOTE — PT/OT/SLP PROGRESS
Speech Language Pathology  Dysphagia  Treatment    Rachael Almaguer   MRN: [id number]   Admitting Diagnosis: PUD (peptic ulcer disease)    Diet recommendations: Solid Diet Level: Puree  Liquid Diet Level: Nectar Thick Feed only when awake/alert, No straws, HOB to 90 degrees, Alternating bites/sips, Check for pocketing/oral residue, Remain upright 30 minutes post meal, Assistance with meals and Assistance with thickening liquids    SLP Treatment Date: 07/26/17  Speech Start Time: 0900     Speech Stop Time: 0925     Speech Total (min): 25 min       TREATMENT BILLABLE MINUTES:  Treatment Swallowing Dysfunction 25    Has the patient been evaluated by SLP for swallowing? : Yes  Keep patient NPO?: No   General Precautions: Standard, aspiration, hearing impaired, pureed diet, nectar thick  Current Respiratory Status: nasal cannula       Subjective:  Patient seen in room, family in room, patient tolerated puree with nectar thick liquids, assistance with meals.  Educated family on need for thickened liquids and no straws.  Patient continues to have mild dry cough but no evidence if aspiration, no fever spikes.  Patient reveals clean oral cavity and lingual movement.  Remains with bruising on tongue on anterior right. ST to contionue for further education and strategies         Objective:        FIM:        Assessment:  Rachael Almaguer is a 89 y.o. female with a medical diagnosis of PUD (peptic ulcer disease) and presents with     Discharge recommendations:       Goals:    SLP Goals        Problem: SLP Goal    Goal Priority Disciplines Outcome   SLP Goal     SLP Ongoing (interventions implemented as appropriate)   Description:  Short term goals:  1) patient will tolerate puree with nectar thick liquids without evidence of aspiration, weight loss or dehydration  2) patient will utilize compensatory strategies with max assistance for safe effective po intake                      Plan:   Patient to be seen Therapy Frequency: 3  x/week   Plan of Care expires: 07/29/17  Plan of Care reviewed with: patient, caregiver  SLP Follow-up?: Yes  SLP - Next Visit Date: 07/27/17           Ching Ndiaye CCC-SLP  07/26/2017

## 2017-07-26 NOTE — SUBJECTIVE & OBJECTIVE
Interval History: Patient did not sleep last night. She tends to sleep for most of the day. Still a bit tachy     Review of Systems   HENT: Negative.    Eyes: Negative.    Respiratory: Negative.    Cardiovascular: Negative.    Gastrointestinal: Negative.    Endocrine: Negative.    Genitourinary: Negative.    Neurological: Positive for weakness.        Drowsy   Hematological: Negative.      Objective:     Vital Signs (Most Recent):  Temp: 98.1 °F (36.7 °C) (07/26/17 1255)  Pulse: 100 (07/26/17 1255)  Resp: 18 (07/26/17 1255)  BP: (!) 106/44 (07/26/17 1255)  SpO2: 99 % (07/26/17 1255) Vital Signs (24h Range):  Temp:  [97.4 °F (36.3 °C)-98.2 °F (36.8 °C)] 98.1 °F (36.7 °C)  Pulse:  [] 100  Resp:  [18-20] 18  SpO2:  [95 %-99 %] 99 %  BP: (106-164)/(44-81) 106/44     Weight: 66 kg (145 lb 8.1 oz)  Body mass index is 24.21 kg/m².    Intake/Output Summary (Last 24 hours) at 07/26/17 1559  Last data filed at 07/26/17 1255   Gross per 24 hour   Intake          2705.83 ml   Output                0 ml   Net          2705.83 ml      Physical Exam   Constitutional: She appears well-developed.   Thin and frail.   HENT:   Head: Normocephalic.   +temporal wasting, hard of hearing.   Eyes: EOM are normal. Pupils are equal, round, and reactive to light.   Neck: Normal range of motion. Neck supple.   Cardiovascular: Normal rate and regular rhythm.    Pulmonary/Chest: Effort normal and breath sounds normal.   Abdominal: Soft. She exhibits no distension.   Musculoskeletal: Normal range of motion. She exhibits no edema.   Neurological:   Drowsy but easily arousable and redirectable. Slurred speech noted   Skin: Skin is warm and dry.   Psychiatric: Her behavior is normal. Thought content normal.       Significant Labs: All pertinent labs within the past 24 hours have been reviewed.    Significant Imaging: I have reviewed all pertinent imaging results/findings within the past 24 hours.  I have reviewed and interpreted all pertinent  imaging results/findings within the past 24 hours.

## 2017-07-26 NOTE — PROGRESS NOTES
"[facility] [place] [facility]  Progress Note    Patient Name: Rachael Almaguer  MRN: [id number]  Patient Class: IP- Inpatient   Admission Date: 7/20/2017  Length of Stay: 6 days  Attending Physician: Marisela Hollis MD  Primary Care Provider: Boris Carmichael MD        Subjective:     Principal Problem:PUD (peptic ulcer disease)    HPI:  Rachael Almaguer is a 89 y.o. with medical history HTN, CAD, and depression. She presented for evaluation of 5 day history of hallucinations since discharge from this hospital on Saturday. Patient was admitted for urinary retension. History taken from patients chart and patient.    Per patient, who surprisingly gives good history except for the visual hallucinations, she had been seeing people who are not there. She tells me that her 10 year old granddaughter is in the chair in the room during history taking. However, she is able to tell me that on Monday she saw her PCP who told her "you don't look good". She also saw her cardiologist (Dr. Whiting) today who told her to come to the hospital. Additionally, patient reports BLLE swelling.    Patient is found to have significant drop in H/H since 2 weeks ago was 14.1/41.0 now 10.5/30.9; dehydration with BUN/CR/GFR 57/1.8/24 compared to Jan her renal functions were normal.            Hospital Course:  Ms. Almaguer was admitted with AMS of uncertain etiology in the beginning, but quickly became clear after repeat BMP showed Na was 117 causing metabolic encephalopathy. Pt had negative head CT and further workup with MRI was unremarkable. Neurology following. Pt with hypovolemia causing hyponatremia and all diuretics stopped and steady correction with NS in progress with close monitoring of electrolytes. Patient was transferred to the ICU on 7/22 for acute GI bleed.  GI was consulted and took the patient to EGD the same day.  Blood was found in the entire stomach.  The patient received multiple units of blood. She continued " to bleed and the patient was taken back to EGD on 7/23. This showed non-bleeding esophageal ulcer as well as a non-bleeding gastric ulcer with cautery and clips placed.  GI recommended continued ICU monitoring.  Patient is DNR. Patient received blood again on 7/23. She received a total of 4 units of blood. The patient was very agitated on 7/23 and Zyprexa was started. The patient's H/H stabilized since 7/23 and the patient was sent to the floor on 7/24. Speech, PT/OT were consulted on 7/24. Speech recommended pureed with nectar thickened liquids. Metoprolol increased 50 mg BID for AFib. Anticoagulation on hold indefinitely. Patient and family aware that AFib increases risk for stroke when not on anticoagulation. Verbalized understanding. Working on SNF vs hospice pending formal meeting with palliative care.     Interval History: Patient did not sleep last night. She tends to sleep for most of the day. Still a bit tachy     Review of Systems   HENT: Negative.    Eyes: Negative.    Respiratory: Negative.    Cardiovascular: Negative.    Gastrointestinal: Negative.    Endocrine: Negative.    Genitourinary: Negative.    Neurological: Positive for weakness.        Drowsy   Hematological: Negative.      Objective:     Vital Signs (Most Recent):  Temp: 98.1 °F (36.7 °C) (07/26/17 1255)  Pulse: 100 (07/26/17 1255)  Resp: 18 (07/26/17 1255)  BP: (!) 106/44 (07/26/17 1255)  SpO2: 99 % (07/26/17 1255) Vital Signs (24h Range):  Temp:  [97.4 °F (36.3 °C)-98.2 °F (36.8 °C)] 98.1 °F (36.7 °C)  Pulse:  [] 100  Resp:  [18-20] 18  SpO2:  [95 %-99 %] 99 %  BP: (106-164)/(44-81) 106/44     Weight: 66 kg (145 lb 8.1 oz)  Body mass index is 24.21 kg/m².    Intake/Output Summary (Last 24 hours) at 07/26/17 3699  Last data filed at 07/26/17 1255   Gross per 24 hour   Intake          2705.83 ml   Output                0 ml   Net          2705.83 ml      Physical Exam   Constitutional: She appears well-developed.   Thin and frail.    HENT:   Head: Normocephalic.   +temporal wasting, hard of hearing.   Eyes: EOM are normal. Pupils are equal, round, and reactive to light.   Neck: Normal range of motion. Neck supple.   Cardiovascular: Normal rate and regular rhythm.    Pulmonary/Chest: Effort normal and breath sounds normal.   Abdominal: Soft. She exhibits no distension.   Musculoskeletal: Normal range of motion. She exhibits no edema.   Neurological:   Drowsy but easily arousable and redirectable. Slurred speech noted   Skin: Skin is warm and dry.   Psychiatric: Her behavior is normal. Thought content normal.       Significant Labs: All pertinent labs within the past 24 hours have been reviewed.    Significant Imaging: I have reviewed all pertinent imaging results/findings within the past 24 hours.  I have reviewed and interpreted all pertinent imaging results/findings within the past 24 hours.    Assessment/Plan:      * PUD (peptic ulcer disease)    Patient had EGD on 7/22 and 7/23. 7/22- unable to visualize secondary to blood. Transfused blood. Second EGD on 7/23- esophageal and gastric ulcers with cautery and clips placed to gastric ulcer. Received further blood on 7/23- total of 4 units since admission. Discussed case with GI. Further bleeding?- surgery/IR consult. H/H now stable. Off all anticoagulation. Change CBC to every other day. Changed PPI to BID. Likely to transition to PO tomorrow          Metabolic encephalopathy    Unclear etiology at first given Na level was normal, but repeat level markedly different with hyponatremia of 117 and repeat confirmed to be true value. Workup with head CT and MRI negative, non-focal exam. Recent admit for urinary retention made infectious process a consideration but all cultures are negative. Is better, but I believe she may still be experience side effect from sedation given for EGD. Patient not sleeping well at nights but sleeping all day. Will get up to chair for meals and up with PT. Will give  ramelteon tonight so she can sleep tonight and hopefully be more awake tomorrow. Frequent reorientation. Blinds open during the day time          Dysphagia    Continued pureed diet with nectar thickened liquids. Only eating about 25%. Drowsiness may be playing a roll. Will slowly taper off fluids. Speech on board. Appreciate further recs.           Paroxysmal atrial fibrillation    Off anticoagulation. Increase metoprolol          Debility    consulted PT/OT. Recommending SNF. PPD placed.  aware          DNR (do not resuscitate)    Reviewed with patient and family, she is DNR. Palliative care consulted for counseling and LaPOST filled out. Palliative care following.           Urinary retention    Pt was here last week for urinary retention and started on flomax. Bladder scan and straight cath if needed.        Chronic diastolic congestive heart failure    No evidence of decompensation and with likely over-diuresis just prior to admission. Continue to hold diuretics and judicious IVF as discussed above.          VTE Risk Mitigation         Ordered     Medium Risk of VTE  Once      07/21/17 0149     Reason for No Pharmacological VTE Prophylaxis  Once      07/21/17 0149        Up in chair today. PT/OT. Needs to sleep at nights. Assist with meals. Palliative care meeting today. SNF vs hospice.     Marisela Mabry MD  Department of Hospital Medicine   [facility]

## 2017-07-26 NOTE — PLAN OF CARE
Problem: Patient Care Overview  Goal: Plan of Care Review  Outcome: Ongoing (interventions implemented as appropriate)  Pt Awake and Oriented to self only,Extremely [place], Pt up most of the night, extremely thirsty, Increased PO intake. VS WDL, Continue plan of care.

## 2017-07-26 NOTE — PLAN OF CARE
Problem: Patient Care Overview  Goal: Plan of Care Review  No falls or injury. Fall and safety precautions maintained. Bed alarm activated. mepilex dressing applied to skin breakdown/weeping areas. TB skin prep placed to LFA today. Continue reading X2 additional days. PT session in process. Pt needing encouragement, but does not want to be bothered. VSS. Reorientation prn. Continue plan of care.

## 2017-07-26 NOTE — PT/OT/SLP PROGRESS
Physical Therapy  Treatment    Rachael Almaguer   MRN: [id number]   Admitting Diagnosis: PUD (peptic ulcer disease)    PT Received On: 07/26/17  PT Start Time: 1604     PT Stop Time: 1630    PT Total Time (min): 26 min       Billable Minutes:  Therapeutic Activity  13 co-treat with OT     Treatment Type: Treatment  PT/PTA: PT     PTA Visit Number: 0       General Precautions: Standard, aspiration, nectar thick, pureed diet, hearing impaired, fall  Orthopedic Precautions: N/A   Braces: N/A    Subjective:  Communicated with nurse Renu prior to session.  Patient needed encouragement to get in bedside chair. She stated that she was sleepy and PT was aggravating her when she just wanted to sleep.     Pain/Comfort  Pain Rating 1:  (unable to rate with number )  Location 1: head  Pain Addressed 1: Cessation of Activity, Reposition    Objective:   Patient found with: telemetry, PICC line, oxygen    Functional Mobility:  Bed Mobility:   Supine to Sit: Dependent    Transfers:  Sit <> Stand Assistance: Moderate Assistance (2 person assist)    Gait:   Gait Distance: ~5 steps from bed to bedside chair   Assistance 1: Moderate assistance  Gait Assistive Device: Hand held assist (x2 people)  Gait Deviation(s): decreased maura, increased time in double stance, decreased velocity of limb motion, decreased step length, decreased toe-to-floor clearance, decreased weight-shifting ability    Balance:   Static Sit: FAIR: Maintains without assist, but unable to take any challenges   Dynamic Sit: POOR: N/A  Static Stand: POOR: Needs MODERATE assist to maintain  Dynamic stand: 0: N/A     AM-PAC 6 CLICK MOBILITY  How much help from another person does this patient currently need?   1 = Unable, Total/Dependent Assistance  2 = A lot, Maximum/Moderate Assistance  3 = A little, Minimum/Contact Guard/Supervision  4 = None, Modified [place]/Independent    Turning over in bed (including adjusting bedclothes, sheets and blankets)?:  2  Sitting down on and standing up from a chair with arms (e.g., wheelchair, bedside commode, etc.): 2  Moving from lying on back to sitting on the side of the bed?: 2  Moving to and from a bed to a chair (including a wheelchair)?: 2  Need to walk in hospital room?: 2  Climbing 3-5 steps with a railing?: 1  Total Score: 11    AM-PAC Raw Score CMS G-Code Modifier Level of Impairment Assistance   6 % Total / Unable   7 - 9 CM 80 - 100% Maximal Assist   10 - 14 CL 60 - 80% Moderate Assist   15 - 19 CK 40 - 60% Moderate Assist   20 - 22 CJ 20 - 40% Minimal Assist   23 CI 1-20% SBA / CGA   24 CH 0% Independent/ Mod I     Patient left up in chair with all lines intact, call button in reach, nurse notified and daughter and daughter in law  present.    Assessment:  Rachael Almaguer is a 89 y.o. female with a medical diagnosis of PUD (peptic ulcer disease) and presents with decreased strength and endurance for functional mobility. She needed increased encouragement to participate in PT session today. She would continue to benefit from PT while in the hospital in order to address deficits listed below and get patient back to PLOF.     Rehab identified problem list/impairments: Rehab identified problem list/impairments: weakness, impaired endurance, impaired functional mobilty, gait instability, impaired balance, decreased lower extremity function, impaired cognition, decreased safety awareness, impaired coordination, impaired cardiopulmonary response to activity, impaired skin, edema, pain    Rehab potential is fair.    Activity tolerance: Fair    Discharge recommendations: Discharge Facility/Level Of Care Needs: nursing facility, skilled (if discharges home will need 24 hour assistance)   Barriers to discharge: Barriers to Discharge: Decreased caregiver support (patient lives alone )    Equipment recommendations: Equipment Needed After Discharge: wheelchair     GOALS:    Physical Therapy Goals        Problem:  Physical Therapy Goal    Goal Priority Disciplines Outcome Goal Variances Interventions   Physical Therapy Goal     PT/OT, PT Ongoing (interventions implemented as appropriate)     Description:  Goals to be met by: 17    Patient will increase functional independence with mobility by performin. Supine to sit with Moderate Assistance  2. Sit to stand transfer with Moderate Assistance  3. Gait x50ft with RW and moderate assistance  4. Lower extremity exercise program x30 reps per handout, with assistance as needed                     PLAN:    Patient to be seen 6 x/week  to address the above listed problems via gait training, therapeutic activities, therapeutic exercises  Plan of Care expires: 17  Plan of Care reviewed with: patient, daughter (daughter in law )    Lily Walton, PT, MOT  2017

## 2017-07-26 NOTE — NURSING
Call placed to [facility]. Informed family inquiring about arrival time. She reported she will see them around 5pm today.

## 2017-07-26 NOTE — ASSESSMENT & PLAN NOTE
Continued pureed diet with nectar thickened liquids. Only eating about 25%. Drowsiness may be playing a roll. Will slowly taper off fluids. Speech on board. Appreciate further recs.

## 2017-07-26 NOTE — PLAN OF CARE
Problem: SLP Goal  Goal: SLP Goal  Short term goals:  1) patient will tolerate puree with nectar thick liquids without evidence of aspiration, weight loss or dehydration  2) patient will utilize compensatory strategies with max assistance for safe effective po intake    Outcome: Ongoing (interventions implemented as appropriate)  Patient seen in room, family in room, patient tolerated puree with nectar thick liquids, assistance with meals.  Educated family on need for thickened liquids and no straws.  Patient continues to have mild dry cough but no evidence if aspiration, no fever spikes.  Patient reveals clean oral cavity and lingual movement.  Remains with bruising on tongue on anterior right. ST to contionue for further education and strategies

## 2017-07-26 NOTE — PT/OT/SLP PROGRESS
"Occupational Therapy  Treatment    Rachael Almaguer   MRN: [id number]   Admitting Diagnosis: PUD (peptic ulcer disease)    OT Date of Treatment: 07/26/17   OT Start Time: 1604  OT Stop Time: 1630  OT Total Time (min): 26 min    Billable Minutes:  Therapeutic Activity 13 minutes (co-treat with PT)    General Precautions: Standard, aspiration, nectar thick, pureed diet, hearing impaired, fall  Orthopedic Precautions: N/A  Braces: N/A    Do you have any cultural, spiritual, Islam conflicts, given your current situation?: none    Subjective:  Communicated with nursing prior to session.  "You people wnt to move me around like a sack of potatoes, I don't want to get up."  Pain/Comfort  Pain Rating 1:  (unable to rate with a number)    Objective:  Patient found with: PICC line, telemetry, oxygen     Functional Mobility:  Bed Mobility:  Rolling/Turning Right: Dependent  Supine to Sit: Dependent, With assist of 2    Transfers:   Sit <> Stand Assistance: Maximum Assistance  Sit <> Stand Assistive Device: Other (see comments) (HHA)  Bed <> Chair Transfer Assistance: Maximum Assistance  Bed <> Chair Assistive Device: No Assistive Device, Other (see comments) (HHA)    Functional Ambulation: NT    Activities of Daily Living:  UE Dressing Level of Assistance: Minimum assistance  LE Dressing Level of Assistance: Total assistance    Balance:   Static Sit: FAIR-: Maintains without assist but inconsistent   Dynamic Sit: FAIR: Cannot move trunk without losing balance  Static Stand: POOR: Needs MODERATE assist to maintain  Dynamic stand: 0: N/A    AM-PAC 6 CLICK ADL   How much help from another person does this patient currently need?   1 = Unable, Total/Dependent Assistance  2 = A lot, Maximum/Moderate Assistance  3 = A little, Minimum/Contact Guard/Supervision  4 = None, Modified [place]/Independent    Putting on and taking off regular lower body clothing? : 1  Bathing (including washing, rinsing, drying)?: 1  Toileting, which " "includes using toilet, bedpan, or urinal? : 2  Putting on and taking off regular upper body clothing?: 2  Taking care of personal grooming such as brushing teeth?: 2  Eating meals?: 2  Total Score: 10     AM-PAC Raw Score CMS "G-Code Modifier Level of Impairment Assistance   6 % Total / Unable   7 - 8 CM 80 - 100% Maximal Assist   9-13 CL 60 - 80% Moderate Assist   14 - 19 CK 40 - 60% Moderate Assist   20 - 22 CJ 20 - 40% Minimal Assist   23 CI 1-20% SBA / CGA   24 CH 0% Independent/ Mod I       Patient left up in chair with all lines intact, call button in reach and faily present    ASSESSMENT:  Rachael Almaguer is a 89 y.o. female with a medical diagnosis of PUD (peptic ulcer disease) and presents with  independence for self-care and functional transfers.    Rehab identified problem list/impairments: Rehab identified problem list/impairments: weakness, impaired endurance, impaired self care skills, impaired functional mobilty, impaired balance, impaired cognition, decreased upper extremity function, decreased safety awareness, pain, impaired coordination, impaired cardiopulmonary response to activity    Rehab potential is fair.    Activity tolerance: Poor    Discharge recommendations: Discharge Facility/Level Of Care Needs: nursing facility, skilled (I discharges home will need 24/7 assistance)     Barriers to discharge: Barriers to Discharge: Decreased caregiver support (patient lives alone)    Equipment recommendations: wheelchair     GOALS:    Occupational Therapy Goals        Problem: Occupational Therapy Goal    Goal Priority Disciplines Outcome Interventions   Occupational Therapy Goal     OT, PT/OT Ongoing (interventions implemented as appropriate)    Description:  Goals to be met by: 2017     Patient will increase functional independence with ADLs by performing:    UE Dressing with Stand-by Assistance.  LE Dressing with Minimal Assistance.  Grooming while seated with Contact Guard " Assistance.  Sitting at edge of bed x10 minutes with Stand-by Assistance.  Supine to sit with Contact Guard Assistance.  Upper extremity exercise program with assistance as needed.                      Plan:  Patient to be seen 3 x/week to address the above listed problems via self-care/home management, therapeutic activities, therapeutic exercises  Plan of Care expires: 08/01/17  Plan of Care reviewed with: patient, daughter, family    SINAN Snow, MS  07/26/2017

## 2017-07-26 NOTE — ASSESSMENT & PLAN NOTE
Patient had EGD on 7/22 and 7/23. 7/22- unable to visualize secondary to blood. Transfused blood. Second EGD on 7/23- esophageal and gastric ulcers with cautery and clips placed to gastric ulcer. Received further blood on 7/23- total of 4 units since admission. Discussed case with GI. Further bleeding?- surgery/IR consult. H/H now stable. Off all anticoagulation. Change CBC to every other day. Changed PPI to BID. Likely to transition to PO tomorrow

## 2017-07-27 PROBLEM — R13.12 OROPHARYNGEAL DYSPHAGIA: Status: ACTIVE | Noted: 2017-07-25

## 2017-07-27 LAB
ALBUMIN SERPL BCP-MCNC: 2.1 G/DL
ALP SERPL-CCNC: 55 U/L
ALT SERPL W/O P-5'-P-CCNC: 28 U/L
ANION GAP SERPL CALC-SCNC: 3 MMOL/L
AST SERPL-CCNC: 40 U/L
BILIRUB SERPL-MCNC: 2.4 MG/DL
BUN SERPL-MCNC: 24 MG/DL
CALCIUM SERPL-MCNC: 7.8 MG/DL
CHLORIDE SERPL-SCNC: 109 MMOL/L
CO2 SERPL-SCNC: 27 MMOL/L
CREAT SERPL-MCNC: 0.7 MG/DL
EST. GFR  (AFRICAN AMERICAN): >60 ML/MIN/1.73 M^2
EST. GFR  (NON AFRICAN AMERICAN): >60 ML/MIN/1.73 M^2
GLUCOSE SERPL-MCNC: 112 MG/DL
MAGNESIUM SERPL-MCNC: 1.8 MG/DL
PHOSPHATE SERPL-MCNC: 1.8 MG/DL
POTASSIUM SERPL-SCNC: 4.6 MMOL/L
PROT SERPL-MCNC: 5.1 G/DL
SODIUM SERPL-SCNC: 139 MMOL/L

## 2017-07-27 PROCEDURE — 90792 PSYCH DIAG EVAL W/MED SRVCS: CPT | Mod: ,,, | Performed by: PSYCHIATRY & NEUROLOGY

## 2017-07-27 PROCEDURE — C9113 INJ PANTOPRAZOLE SODIUM, VIA: HCPCS | Performed by: INTERNAL MEDICINE

## 2017-07-27 PROCEDURE — 25000003 PHARM REV CODE 250: Performed by: INTERNAL MEDICINE

## 2017-07-27 PROCEDURE — 97535 SELF CARE MNGMENT TRAINING: CPT

## 2017-07-27 PROCEDURE — 63600175 PHARM REV CODE 636 W HCPCS: Performed by: INTERNAL MEDICINE

## 2017-07-27 PROCEDURE — 11000001 HC ACUTE MED/SURG PRIVATE ROOM

## 2017-07-27 PROCEDURE — 84100 ASSAY OF PHOSPHORUS: CPT

## 2017-07-27 PROCEDURE — 80053 COMPREHEN METABOLIC PANEL: CPT

## 2017-07-27 PROCEDURE — 97530 THERAPEUTIC ACTIVITIES: CPT

## 2017-07-27 PROCEDURE — 83735 ASSAY OF MAGNESIUM: CPT

## 2017-07-27 RX ORDER — POLYETHYLENE GLYCOL 3350 17 G/17G
17 POWDER, FOR SOLUTION ORAL ONCE
Status: COMPLETED | OUTPATIENT
Start: 2017-07-27 | End: 2017-07-27

## 2017-07-27 RX ORDER — FUROSEMIDE 10 MG/ML
20 INJECTION INTRAMUSCULAR; INTRAVENOUS ONCE
Status: COMPLETED | OUTPATIENT
Start: 2017-07-27 | End: 2017-07-27

## 2017-07-27 RX ORDER — DEXTROSE MONOHYDRATE AND SODIUM CHLORIDE 5; .9 G/100ML; G/100ML
INJECTION, SOLUTION INTRAVENOUS CONTINUOUS
Status: ACTIVE | OUTPATIENT
Start: 2017-07-27 | End: 2017-07-27

## 2017-07-27 RX ADMIN — MICONAZOLE NITRATE: 20 CREAM TOPICAL at 09:07

## 2017-07-27 RX ADMIN — POLYETHYLENE GLYCOL 3350 17 G: 17 POWDER, FOR SOLUTION ORAL at 10:07

## 2017-07-27 RX ADMIN — METOPROLOL TARTRATE 50 MG: 50 TABLET ORAL at 10:07

## 2017-07-27 RX ADMIN — FUROSEMIDE 20 MG: 10 INJECTION, SOLUTION INTRAMUSCULAR; INTRAVENOUS at 11:07

## 2017-07-27 RX ADMIN — PANTOPRAZOLE SODIUM 40 MG: 40 INJECTION, POWDER, FOR SOLUTION INTRAVENOUS at 10:07

## 2017-07-27 RX ADMIN — MICONAZOLE NITRATE: 20 CREAM TOPICAL at 10:07

## 2017-07-27 RX ADMIN — PANTOPRAZOLE SODIUM 40 MG: 40 INJECTION, POWDER, FOR SOLUTION INTRAVENOUS at 11:07

## 2017-07-27 RX ADMIN — POTASSIUM & SODIUM PHOSPHATES POWDER PACK 280-160-250 MG 1 PACKET: 280-160-250 PACK at 10:07

## 2017-07-27 NOTE — CONSULTS
"[facility]  Psychiatry  Consult Note    Patient Name: Rachael Almaguer  MRN: [id number]   Code Status: DNR  Admission Date: 7/20/2017  Hospital Length of Stay: 7 days  Attending Physician: Norman Sánchez MD  Primary Care Provider: Boris Carmichael MD    Current Legal Status: N/A    Patient information was obtained from patient, relative(s) and ER records.   Inpatient consult to Psychiatry  Consult performed by: WARNER RUBIO  Consult ordered by: NORMAN SÁNCHEZ        Subjective:     Principal Problem:PUD (peptic ulcer disease)    Chief Complaint:  Altered mental status     HPI: Patient Rachael Almaguer presents to the hospital with hyponatremia, confusion, blood loss.  It has been a complicated hospitalization with ongoing delirium.  She initially was placed on olanzapine to help with her confusion and agitation but this was stopped when she was noted to be sleeping most of the time.  Patient is still very sleepy in her room and difficult to awaken.  Once awake, she is a difficult historian but is oriented to "hospital" in her name.  She is able to give me her date of birth and home address.  She is also able to name her daughter and daughter-in-law at bedside.  Patient then goes off with tangential rambling about how she cooked this morning and really enjoyed eating pears.  She also says that she sees people, particularly little children running around the room and hiding behind the TV.  She says that these little children hide behind her TV and prevent her from going to sleep at night.  She is not able to provide much more in conversation.  Family at bedside says that patient was relatively self-sufficient a year ago with gradual decline over the course of the past few months and that she lives alone.  She has been very adamant about returning back to her house.  She is very sedated and falls back asleep easily.  Family reports no psychiatric history or medications.    Hospital " Course: Comp located medical course.  Psych consult placed for delirium         Patient History           Medical as of 7/27/2017     Past Medical History Date Comments Source    Coronary artery disease -- -- Provider    Hepatitis C -- -- Provider    Hypertension -- -- Provider    Pertinent Negatives Date Noted Comments Source    History of psychiatric hospitalization 7/27/2017 -- Provider    Hx of psychiatric care 7/27/2017 -- Provider    Psychiatric problem 7/27/2017 -- Provider    Suicide attempt 7/27/2017 -- Provider    Therapy 7/27/2017 -- Provider            Surgical as of 7/27/2017    Past Surgical History: Patient provided no pertinent surgical history.           Family as of 7/27/2017    **None**           Tobacco Use as of 7/27/2017     Smoking Status Smoking Start Date Smoking Quit Date Packs/day Years Used    Former Smoker -- -- -- --    Types Comments Smokeless Tobacco Status Smokeless Tobacco Quit Date Source    -- -- Never Used -- Provider            Alcohol Use as of 7/27/2017     Alcohol Use Drinks/Week Alcohol/Week Comments Source    No -- -- -- Provider            Drug Use as of 7/27/2017     Drug Use Types Frequency Comments Source    No -- -- -- Provider            Sexual Activity as of 7/27/2017     Sexually Active Birth Control Partners Comments Source    Not Currently -- -- -- Provider            Activities of Daily Living as of 7/27/2017     Activities of Daily Living Question Response Comments Source    Patient feels they ought to cut down on drinking/drug use Not Asked -- Provider    Patient annoyed by others criticizing their drinking/drug use Not Asked -- Provider    Patient has felt bad or guilty about drinking/drug use Not Asked -- Provider    Patient has had a drink/used drugs as an eye opener in the AM Not Asked -- Provider            Social Documentation as of 7/27/2017    Social (Marriage, Living Situation, Children): lives alone at home,  19 years ago, 4 children  :  "none  Financial: social security    Psychiatric:  -Has reported that she wants to "die" in her home, refuses to go to a nursing home.   -No history of psychiatrist or therapist    Source: Provider           Occupational as of 7/27/2017     Occupation Employer Comments Source    retired -- -- Provider            Socioeconomic as of 7/27/2017     Marital Status Spouse Name Number of Children Years Education Preferred Language Ethnicity Race Source     -- 4 -- English /White White Provider         Additional Pertinent History Q A Comments    as of 7/27/2017 Place in Birth Order      Number of Siblings      Raised by      Childhood Trauma      Financial Status other retired    Highest Level of Education high school graduation     Lives with alone     Lives in home     Criminal History of      Legal Involvement      Does patient have access to a firearm?       Service      Primary Leisure Activity      Spirituality      Caffeine Use         Review of patient's allergies indicates:   Allergen Reactions    Aspirin     Xanax [alprazolam]        No current facility-administered medications on file prior to encounter.      Current Outpatient Prescriptions on File Prior to Encounter   Medication Sig    alendronate (FOSAMAX) 10 MG Tab Take 5 mg by mouth once daily.    busPIRone (BUSPAR) 10 MG tablet Take 10 mg by mouth 2 (two) times daily.     dabigatran etexilate (PRADAXA) 75 mg Cap Take 75 mg by mouth 2 (two) times daily. "Do NOT break, chew, or open capsules."    furosemide (LASIX) 20 MG tablet Take 1 tablet (20 mg total) by mouth 2 (two) times daily.    lisinopril 10 MG tablet Take 1 tablet (10 mg total) by mouth once daily.    metoprolol tartrate (LOPRESSOR) 50 MG tablet Take 50 mg by mouth 2 (two) times daily.    mirtazapine (REMERON) 15 MG tablet Take 15 mg by mouth every evening.    MV, MIN #36/IRON,CARBONYL/FA (GERITOL COMPLETE ORAL) Take by mouth once daily.     acetaminophen " "(TYLENOL) 325 MG tablet Take 325 mg by mouth Daily.     Psychotherapeutics     None        Review of Systems   Unable to perform ROS: Mental status change     Objective:     Vital Signs (Most Recent):  Temp: 98.7 °F (37.1 °C) (07/27/17 1223)  Pulse: 109 (07/27/17 1223)  Resp: 18 (07/27/17 1223)  BP: 136/83 (07/27/17 1223)  SpO2: 95 % (07/27/17 1223) Vital Signs (24h Range):  Temp:  [97.7 °F (36.5 °C)-98.7 °F (37.1 °C)] 98.7 °F (37.1 °C)  Pulse:  [] 109  Resp:  [17-19] 18  SpO2:  [95 %-98 %] 95 %  BP: (108-136)/(46-83) 136/83     Height: 5' 5" (165.1 cm)  Weight: 66 kg (145 lb 8.1 oz)  Body mass index is 24.21 kg/m².      Intake/Output Summary (Last 24 hours) at 07/27/17 1548  Last data filed at 07/27/17 1312   Gross per 24 hour   Intake             1305 ml   Output                0 ml   Net             1305 ml       Physical Exam   Psychiatric:   EXAMINATION    CONSTITUTIONAL  General Appearance: Frail, elderly, sleeping    MUSCULOSKELETAL  Muscle Strength and Tone: Weak  Abnormal Involuntary Movements: None noted  Gait and Station: Not observed    PSYCHIATRIC MENTAL STATUS EXAM   Level of Consciousness: Very sleepy  Orientation: "Hospital" and main  Grooming: Poor  Psychomotor Behavior: Slowed  Speech: Soft and slurred  Language: No abnormalities  Mood: "Okay"  Affect: Blunted  Thought Process: Perseverative  Associations: Not intact  Thought Content: Visual hallucinations  Memory: Poor to recent and poor to remote  Attention: Diminished  Fund of Knowledge: Barely intact to conversation  Insight: Poor  Judgment: Poor              Significant Labs:   Last 24 Hours:   Recent Lab Results       07/27/17  0325      Albumin 2.1(L)     Alkaline Phosphatase 55     ALT 28     Anion Gap 3(L)     AST 40     Total Bilirubin 2.4  Comment:  For infants and newborns, interpretation of results should be based  on gestational age, weight and in agreement with clinical  observations.  Premature Infant recommended reference " ranges:  Up to 24 hours.............<8.0 mg/dL  Up to 48 hours............<12.0 mg/dL  3-5 days..................<15.0 mg/dL  6-29 days.................<15.0 mg/dL  (H)     BUN, Bld 24     Calcium 7.8(L)     Chloride 109     CO2 27     Creatinine 0.7     eGFR if  >60     eGFR if non  >60  Comment:  Calculation used to obtain the estimated glomerular filtration  rate (eGFR) is the CKD-EPI equation. Since race is unknown   in our information system, the eGFR values for   -American and Non--American patients are given   for each creatinine result.       Glucose 112(H)     Magnesium 1.8     Phosphorus 1.8(L)     Potassium 4.6     Total Protein 5.1(L)     Sodium 139         All pertinent labs within the past 24 hours have been reviewed.    Significant Imaging: I have reviewed all pertinent imaging results/findings within the past 24 hours.    Assessment/Plan:     Metabolic encephalopathy    Patient has a delirium likely due to hyponatremia and/or loss of blood.  Regardless, I would not recommend medication management in her care at this time due to her frailty.  Sedating medications may take longer to metabolize and therefore have prolonged effect.  A lot of psychiatric medications also have side effect of hyponatremia.  Hopefully this delirium will clear over the course of the next few days but unfortunately she may be at a new lower than normal functioning baseline.  I would not recommend for her to return home alone.  Either she should move in with family, family move in with her, or seek other living arrangements.  Depending on the severity of her medical situation, hospice may also be a consideration.  Agree with family interview/meeting.             Malik Mcgee MD   Psychiatry  [facility]

## 2017-07-27 NOTE — PROGRESS NOTES
"PALLIATIVE CARE PROGRESS NOTE:    Bedside visit.  Patient sitting up in chair, frail, more [place]? Today, confused, hallucinating "a little one".  Family states she has been talking to people who are not there for the past few days.  She does not appear to be in pain or SOB, but does not really answer when asked.      Met with daughter and daughter-in-law to discuss goals of care.  Discussed SNF.  They are aware she may not be able to live alone again - will depend on progress in SNF. Time allowed for discussion of concerns and feelings.      Suggested we meet again on Friday, and include patient's son (who lives here).      Plan;  Family meeting Friday - no time set yet.    Viridiana Cherry, LIN, RN, CCRN, CHPN   Palliative Care Nurse Coordinator   [facility]  [phone]    "

## 2017-07-27 NOTE — ASSESSMENT & PLAN NOTE
Unclear etiology at first given Na level was normal, but repeat level markedly different with hyponatremia of 117 and repeat confirmed to be true value. Workup with head CT and MRI negative, non-focal exam. Recent admit for urinary retention made infectious process a consideration but all cultures are negative. Is better, but I believe she may still be experience side effect from sedation given for EGD. Patient was not sleeping well at nights but sleeping all day. Attempting to have patient out of bed today to have her more alert, however she is adamantly declining any activity at all. Will continue to encourage to at least be un in chair for meals. Ramelteon did not work as she only slept intermittently last night. Is more alert today however. Will consult psych for advise on this. It appears she has been encephalopathic since admission, however it was purely metabolic at first, now this is delirium of other cause. Use seroquel?? I will check UA and CXR. Is afebrile. Frequent reorientation. Blinds open during the day time, have family around at all times

## 2017-07-27 NOTE — PLAN OF CARE
Problem: Patient Care Overview  Goal: Plan of Care Review  Outcome: Ongoing (interventions implemented as appropriate)  Continue on a pureed diet with thickened liquids. Attempted to do a swallow study test but she did not cooperate and spit out trial fluids. IVF continue and cardiac monitoring continues. PT and OT worked with her and she did sit up in the chair at bedside. She is disoriented. Has a PICC line in her right upper arm. She is a DNR. Medication is crushed and put in pudding. Incontinent at times. Received lasix today. Voiding QS. Family to meet with palliative care tomorrow and also with the MD.

## 2017-07-27 NOTE — PLAN OF CARE
Problem: Patient Care Overview  Goal: Plan of Care Review  Outcome: Ongoing (interventions implemented as appropriate)  Pt free from falls/injury. POC reviewed with son at bedside. VS remained stable. Pt confused, and sometimes hallucinates. She is [place], but cooperative with direction. Pt has redness and excoriation to groin and james area, possibly from diaper irritation. Areas cleaned, and barrier cream applied. Diapers removed, and chux pad applied and secured to leave skin in the area breathe.

## 2017-07-27 NOTE — ASSESSMENT & PLAN NOTE
Patient had EGD on 7/22 and 7/23. 7/22- unable to visualize secondary to blood. Transfused blood. Second EGD on 7/23- esophageal and gastric ulcers with cautery and clips placed to gastric ulcer. Received further blood on 7/23- total of 4 units since admission. Discussed case with GI. Further bleeding?- surgery/IR consult. H/H now stable. Off all anticoagulation. Change CBC to every other day. Changed PPI to BID. Continue via IV as patient likely to decline PO dose.

## 2017-07-27 NOTE — SUBJECTIVE & OBJECTIVE
Interval History: slept intermittently last night. Had a dose of ramelteon last night. Is evidently delirious and at times agitated. Declining to sit in chair or to be moved at all. Is more alert than yesterday however. Appears short of breath. Also coughing after meals. Also with visual hallucination. Gurgling. Daughter in law at bedside.     Review of Systems   HENT: Negative.    Eyes: Negative.    Respiratory: Negative.    Cardiovascular: Negative.    Gastrointestinal: Negative.    Endocrine: Negative.    Genitourinary: Negative.    Neurological: Positive for weakness.   Hematological: Negative.    Psychiatric/Behavioral: Positive for agitation, behavioral problems, confusion, hallucinations and sleep disturbance.     Objective:     Vital Signs (Most Recent):  Temp: 97.8 °F (36.6 °C) (07/27/17 0818)  Pulse: 86 (07/27/17 0818)  Resp: 18 (07/27/17 0818)  BP: 117/68 (07/27/17 0818)  SpO2: 98 % (07/27/17 0818) Vital Signs (24h Range):  Temp:  [97.7 °F (36.5 °C)-98.4 °F (36.9 °C)] 97.8 °F (36.6 °C)  Pulse:  [] 86  Resp:  [17-19] 18  SpO2:  [98 %-99 %] 98 %  BP: (106-134)/(44-68) 117/68     Weight: 66 kg (145 lb 8.1 oz)  Body mass index is 24.21 kg/m².    Intake/Output Summary (Last 24 hours) at 07/27/17 1053  Last data filed at 07/27/17 0912   Gross per 24 hour   Intake             1568 ml   Output                0 ml   Net             1568 ml      Physical Exam   Constitutional: She appears well-developed.   Thin and frail.   HENT:   Head: Normocephalic.   +temporal wasting, hard of hearing.   Eyes: EOM are normal. Pupils are equal, round, and reactive to light.   Neck: Normal range of motion. Neck supple.   Cardiovascular: Normal rate and regular rhythm.    Pulmonary/Chest: Effort normal and breath sounds normal.   Abdominal: Soft. She exhibits no distension.   Musculoskeletal: Normal range of motion. She exhibits no edema.   Neurological: She is alert. No cranial nerve deficit. She exhibits normal muscle  tone.   Disoriented to place and time. Is oriented to person. Visual hallucinations.    Skin: Skin is warm and dry.   Psychiatric: Her affect is labile. Her speech is rapid and/or pressured. She is agitated and actively hallucinating. She exhibits abnormal recent memory and abnormal remote memory.       Significant Labs: All pertinent labs within the past 24 hours have been reviewed.    Significant Imaging: I have reviewed all pertinent imaging results/findings within the past 24 hours.  I have reviewed and interpreted all pertinent imaging results/findings within the past 24 hours.

## 2017-07-27 NOTE — PT/OT/SLP PROGRESS
"Occupational Therapy  Treatment    Rachael Almaguer   MRN: [id number]   Admitting Diagnosis: PUD (peptic ulcer disease)    OT Date of Treatment: 07/27/17   OT Start Time: 1552  OT Stop Time: 1615  OT Total Time (min): 23 min    Billable Minutes:  Self Care/Home Management 11 minutes (co-treat with PT)    General Precautions: Standard, aspiration, hearing impaired, fall, pureed diet, nectar thick  Orthopedic Precautions: N/A  Braces: N/A    Do you have any cultural, spiritual, Roman Catholic conflicts, given your current situation?: none    Subjective:  Communicated with nurse Hernandez prior to session.  "If you want me to, I'll get up" spoken to her son  Pain/Comfort  Pain Rating 1: 0/10    Objective:  Patient found with: PICC line, telemetry, oxygen     Functional Mobility:  Bed Mobility:  Rolling/Turning Right: Maximum assistance  Scooting/Bridging: Maximum Assistance  Supine to Sit: Maximum Assistance    Transfers:   Sit <> Stand Assistance: Moderate Assistance  Sit <> Stand Assistive Device: No Assistive Device  Bed <> Chair Technique: Stand Pivot  Bed <> Chair Transfer Assistance: Moderate Assistance  Bed <> Chair Assistive Device: No Assistive Device (HHA x2)    Functional Ambulation: able to take a couple of steps to bedside chair    Activities of Daily Living:  Feeding Level of Assistance: Maximum assistance (per family statement, they are feeding patient)  UE Dressing Level of Assistance: Maximum assistance  LE Dressing Level of Assistance: Maximum assistance    Balance:   Static Sit: FAIR: Maintains without assist, but unable to take any challenges   Dynamic Sit: FAIR: Cannot move trunk without losing balance  Static Stand: POOR+: Needs MINIMAL assist to maintain  Dynamic stand: POOR: N/A    AM-PAC 6 CLICK ADL   How much help from another person does this patient currently need?   1 = Unable, Total/Dependent Assistance  2 = A lot, Maximum/Moderate Assistance  3 = A little, Minimum/Contact Guard/Supervision  4 = None, " "Modified [place]/Independent    Putting on and taking off regular lower body clothing? : 1  Bathing (including washing, rinsing, drying)?: 1  Toileting, which includes using toilet, bedpan, or urinal? : 2  Putting on and taking off regular upper body clothing?: 2  Taking care of personal grooming such as brushing teeth?: 2  Eating meals?: 2  Total Score: 10     AM-PAC Raw Score CMS "G-Code Modifier Level of Impairment Assistance   6 % Total / Unable   7 - 8 CM 80 - 100% Maximal Assist   9-13 CL 60 - 80% Moderate Assist   14 - 19 CK 40 - 60% Moderate Assist   20 - 22 CJ 20 - 40% Minimal Assist   23 CI 1-20% SBA / CGA   24 CH 0% Independent/ Mod I       Patient left up in chair with all lines intact, call button in reach, nurse notified and family present    ASSESSMENT:  Rachael Almaguer is a 89 y.o. female with a medical diagnosis of PUD (peptic ulcer disease) and presents with  independence for self-care and functional transfers.    Rehab identified problem list/impairments: Rehab identified problem list/impairments: weakness, impaired endurance, impaired self care skills, impaired functional mobilty, impaired balance, impaired cognition, decreased upper extremity function, decreased safety awareness, pain, impaired cardiopulmonary response to activity    Rehab potential is good.    Activity tolerance: Good    Discharge recommendations: Discharge Facility/Level Of Care Needs: nursing facility, skilled (If discharged home will neede  care)     Barriers to discharge: Barriers to Discharge: Decreased caregiver support (patient lives alone)    Equipment recommendations: wheelchair     GOALS:    Occupational Therapy Goals        Problem: Occupational Therapy Goal    Goal Priority Disciplines Outcome Interventions   Occupational Therapy Goal     OT, PT/OT Ongoing (interventions implemented as appropriate)    Description:  Goals to be met by: 2017     Patient will increase functional " independence with ADLs by performing:    UE Dressing with Stand-by Assistance.  LE Dressing with Minimal Assistance.  Grooming while seated with Contact Guard Assistance.  Sitting at edge of bed x10 minutes with Stand-by Assistance.  Supine to sit with Contact Guard Assistance.  Upper extremity exercise program with assistance as needed.                      Plan:  Patient to be seen 3 x/week to address the above listed problems via self-care/home management, therapeutic activities, therapeutic exercises  Plan of Care expires: 08/01/17  Plan of Care reviewed with: patient, daughter, son, family    SINAN Snow, MS  07/27/2017

## 2017-07-27 NOTE — PROGRESS NOTES
Patient returned from swallow study in which could not be performed due to patient not being cooperative. Spit out trials.

## 2017-07-27 NOTE — SUBJECTIVE & OBJECTIVE
Patient History           Medical as of 7/27/2017     Past Medical History Date Comments Source    Coronary artery disease -- -- Provider    Hepatitis C -- -- Provider    Hypertension -- -- Provider    Pertinent Negatives Date Noted Comments Source    History of psychiatric hospitalization 7/27/2017 -- Provider    Hx of psychiatric care 7/27/2017 -- Provider    Psychiatric problem 7/27/2017 -- Provider    Suicide attempt 7/27/2017 -- Provider    Therapy 7/27/2017 -- Provider            Surgical as of 7/27/2017    Past Surgical History: Patient provided no pertinent surgical history.           Family as of 7/27/2017    **None**           Tobacco Use as of 7/27/2017     Smoking Status Smoking Start Date Smoking Quit Date Packs/day Years Used    Former Smoker -- -- -- --    Types Comments Smokeless Tobacco Status Smokeless Tobacco Quit Date Source    -- -- Never Used -- Provider            Alcohol Use as of 7/27/2017     Alcohol Use Drinks/Week Alcohol/Week Comments Source    No -- -- -- Provider            Drug Use as of 7/27/2017     Drug Use Types Frequency Comments Source    No -- -- -- Provider            Sexual Activity as of 7/27/2017     Sexually Active Birth Control Partners Comments Source    Not Currently -- -- -- Provider            Activities of Daily Living as of 7/27/2017     Activities of Daily Living Question Response Comments Source    Patient feels they ought to cut down on drinking/drug use Not Asked -- Provider    Patient annoyed by others criticizing their drinking/drug use Not Asked -- Provider    Patient has felt bad or guilty about drinking/drug use Not Asked -- Provider    Patient has had a drink/used drugs as an eye opener in the AM Not Asked -- Provider            Social Documentation as of 7/27/2017    Social (Marriage, Living Situation, Children): lives alone at home,  19 years ago, 4 children  : none  Financial: social security    Psychiatric:  -Has reported that she  "wants to "die" in her home, refuses to go to a nursing home.   -No history of psychiatrist or therapist    Source: Provider           Occupational as of 7/27/2017     Occupation Employer Comments Source    retired -- -- Provider            Socioeconomic as of 7/27/2017     Marital Status Spouse Name Number of Children Years Education Preferred Language Ethnicity Race Source     -- 4 -- English /White White Provider         Additional Pertinent History Q A Comments    as of 7/27/2017 Place in Birth Order      Number of Siblings      Raised by      Childhood Trauma      Financial Status other retired    Highest Level of Education high school graduation     Lives with alone     Lives in home     Criminal History of      Legal Involvement      Does patient have access to a firearm?       Service      Primary Leisure Activity      Spirituality      Caffeine Use         Review of patient's allergies indicates:   Allergen Reactions    Aspirin     Xanax [alprazolam]        No current facility-administered medications on file prior to encounter.      Current Outpatient Prescriptions on File Prior to Encounter   Medication Sig    alendronate (FOSAMAX) 10 MG Tab Take 5 mg by mouth once daily.    busPIRone (BUSPAR) 10 MG tablet Take 10 mg by mouth 2 (two) times daily.     dabigatran etexilate (PRADAXA) 75 mg Cap Take 75 mg by mouth 2 (two) times daily. "Do NOT break, chew, or open capsules."    furosemide (LASIX) 20 MG tablet Take 1 tablet (20 mg total) by mouth 2 (two) times daily.    lisinopril 10 MG tablet Take 1 tablet (10 mg total) by mouth once daily.    metoprolol tartrate (LOPRESSOR) 50 MG tablet Take 50 mg by mouth 2 (two) times daily.    mirtazapine (REMERON) 15 MG tablet Take 15 mg by mouth every evening.    MV, MIN #36/IRON,CARBONYL/FA (GERITOL COMPLETE ORAL) Take by mouth once daily.     acetaminophen (TYLENOL) 325 MG tablet Take 325 mg by mouth Daily.     Psychotherapeutics     " "None        Review of Systems   Unable to perform ROS: Mental status change     Objective:     Vital Signs (Most Recent):  Temp: 98.7 °F (37.1 °C) (07/27/17 1223)  Pulse: 109 (07/27/17 1223)  Resp: 18 (07/27/17 1223)  BP: 136/83 (07/27/17 1223)  SpO2: 95 % (07/27/17 1223) Vital Signs (24h Range):  Temp:  [97.7 °F (36.5 °C)-98.7 °F (37.1 °C)] 98.7 °F (37.1 °C)  Pulse:  [] 109  Resp:  [17-19] 18  SpO2:  [95 %-98 %] 95 %  BP: (108-136)/(46-83) 136/83     Height: 5' 5" (165.1 cm)  Weight: 66 kg (145 lb 8.1 oz)  Body mass index is 24.21 kg/m².      Intake/Output Summary (Last 24 hours) at 07/27/17 1548  Last data filed at 07/27/17 1312   Gross per 24 hour   Intake             1305 ml   Output                0 ml   Net             1305 ml       Physical Exam   Psychiatric:   EXAMINATION    CONSTITUTIONAL  General Appearance: Frail, elderly, sleeping    MUSCULOSKELETAL  Muscle Strength and Tone: Weak  Abnormal Involuntary Movements: None noted  Gait and Station: Not observed    PSYCHIATRIC MENTAL STATUS EXAM   Level of Consciousness: Very sleepy  Orientation: "Hospital" and main  Grooming: Poor  Psychomotor Behavior: Slowed  Speech: Soft and slurred  Language: No abnormalities  Mood: "Okay"  Affect: Blunted  Thought Process: Perseverative  Associations: Not intact  Thought Content: Visual hallucinations  Memory: Poor to recent and poor to remote  Attention: Diminished  Fund of Knowledge: Barely intact to conversation  Insight: Poor  Judgment: Poor              Significant Labs:   Last 24 Hours:   Recent Lab Results       07/27/17  0325      Albumin 2.1(L)     Alkaline Phosphatase 55     ALT 28     Anion Gap 3(L)     AST 40     Total Bilirubin 2.4  Comment:  For infants and newborns, interpretation of results should be based  on gestational age, weight and in agreement with clinical  observations.  Premature Infant recommended reference ranges:  Up to 24 hours.............<8.0 mg/dL  Up to 48 hours............<12.0 " mg/dL  3-5 days..................<15.0 mg/dL  6-29 days.................<15.0 mg/dL  (H)     BUN, Bld 24     Calcium 7.8(L)     Chloride 109     CO2 27     Creatinine 0.7     eGFR if  >60     eGFR if non  >60  Comment:  Calculation used to obtain the estimated glomerular filtration  rate (eGFR) is the CKD-EPI equation. Since race is unknown   in our information system, the eGFR values for   -American and Non--American patients are given   for each creatinine result.       Glucose 112(H)     Magnesium 1.8     Phosphorus 1.8(L)     Potassium 4.6     Total Protein 5.1(L)     Sodium 139         All pertinent labs within the past 24 hours have been reviewed.    Significant Imaging: I have reviewed all pertinent imaging results/findings within the past 24 hours.

## 2017-07-27 NOTE — PROGRESS NOTES
"[facility] Medicine  Progress Note    Patient Name: Rachael Almaguer  MRN: [id number]  Patient Class: IP- Inpatient   Admission Date: 7/20/2017  Length of Stay: 7 days  Attending Physician: Marisela Hollis MD  Primary Care Provider: Boris Carmichael MD        Subjective:     Principal Problem:PUD (peptic ulcer disease)    HPI:  Rachael Almaguer is a 89 y.o. with medical history HTN, CAD, and depression. She presented for evaluation of 5 day history of hallucinations since discharge from this hospital on Saturday. Patient was admitted for urinary retension. History taken from patients chart and patient.    Per patient, who surprisingly gives good history except for the visual hallucinations, she had been seeing people who are not there. She tells me that her 10 year old granddaughter is in the chair in the room during history taking. However, she is able to tell me that on Monday she saw her PCP who told her "you don't look good". She also saw her cardiologist (Dr. Whiting) today who told her to come to the hospital. Additionally, patient reports BLLE swelling.    Patient is found to have significant drop in H/H since 2 weeks ago was 14.1/41.0 now 10.5/30.9; dehydration with BUN/CR/GFR 57/1.8/24 compared to Jan her renal functions were normal.            Hospital Course:  Ms. Almaguer was admitted with AMS of uncertain etiology in the beginning, but quickly became clear after repeat BMP showed Na was 117 causing metabolic encephalopathy. Pt had negative head CT and further workup with MRI was unremarkable. Neurology following. Pt with hypovolemia causing hyponatremia and all diuretics stopped and steady correction with NS in progress with close monitoring of electrolytes. Patient was transferred to the ICU on 7/22 for acute GI bleed.  GI was consulted and took the patient to EGD the same day.  Blood was found in the entire stomach.  The patient received multiple units of blood. She continued " to bleed and the patient was taken back to EGD on 7/23. This showed non-bleeding esophageal ulcer as well as a non-bleeding gastric ulcer with cautery and clips placed.  GI recommended continued ICU monitoring.  Patient is DNR. Patient received blood again on 7/23. She received a total of 4 units of blood. The patient was very agitated on 7/23 and Zyprexa was started. The patient's H/H stabilized since 7/23 and the patient was sent to the floor on 7/24. No further evidence of GI bleed since. Speech, PT/OT were consulted on 7/24. Has declined significantly and at times is unwilling to participate as she feels exhausted. Speech recommended pureed with nectar thickened liquids, but unfortunately patient continues to show signs of aspiration as she tends to cough after meals. An MBBS was attempted on 7/27 but patient adamantly refused. Metoprolol increased 50 mg BID for better control of AFib. Anticoagulation on hold indefinitely. Patient and family aware that AFib increases risk for stroke when not on anticoagulation. Verbalized understanding. Also third-spacing and evidently with pulmonary edema. Trial of IV lasix but will need to continue very gently hydration with D5W as PO intake is extremely limited and is at risk for hypoglycemia. Delirium continues to be an issue. Barely sleeps at night. Did not do better with melatonin. Psych consulted for recs on this matter. I am concerned that patient will not achieve meaningful recovery in order to transfer to a SNF. Used to live alone and was fairly independent up until a month ago or so (per daughter and daughter in law). Is DNR. Palliative care on board. Pending official meeting with daughter and son tomorrow morning to clarify goals of care.    Interval History: slept intermittently last night. Had a dose of ramelteon last night. Is evidently delirious and at times agitated. Declining to sit in chair or to be moved at all. Is more alert than yesterday however. Appears  short of breath. Also coughing after meals. Also with visual hallucination. Gurgling. Daughter in law at bedside.     Review of Systems   HENT: Negative.    Eyes: Negative.    Respiratory: Negative.    Cardiovascular: Negative.    Gastrointestinal: Negative.    Endocrine: Negative.    Genitourinary: Negative.    Neurological: Positive for weakness.   Hematological: Negative.    Psychiatric/Behavioral: Positive for agitation, behavioral problems, confusion, hallucinations and sleep disturbance.     Objective:     Vital Signs (Most Recent):  Temp: 97.8 °F (36.6 °C) (07/27/17 0818)  Pulse: 86 (07/27/17 0818)  Resp: 18 (07/27/17 0818)  BP: 117/68 (07/27/17 0818)  SpO2: 98 % (07/27/17 0818) Vital Signs (24h Range):  Temp:  [97.7 °F (36.5 °C)-98.4 °F (36.9 °C)] 97.8 °F (36.6 °C)  Pulse:  [] 86  Resp:  [17-19] 18  SpO2:  [98 %-99 %] 98 %  BP: (106-134)/(44-68) 117/68     Weight: 66 kg (145 lb 8.1 oz)  Body mass index is 24.21 kg/m².    Intake/Output Summary (Last 24 hours) at 07/27/17 1053  Last data filed at 07/27/17 0912   Gross per 24 hour   Intake             1568 ml   Output                0 ml   Net             1568 ml      Physical Exam   Constitutional: She appears well-developed.   Thin and frail.   HENT:   Head: Normocephalic.   +temporal wasting, hard of hearing.   Eyes: EOM are normal. Pupils are equal, round, and reactive to light.   Neck: Normal range of motion. Neck supple.   Cardiovascular: Normal rate and regular rhythm.    Pulmonary/Chest: Effort normal and breath sounds normal.   Abdominal: Soft. She exhibits no distension.   Musculoskeletal: Normal range of motion. She exhibits no edema.   Neurological: She is alert. No cranial nerve deficit. She exhibits normal muscle tone.   Disoriented to place and time. Is oriented to person. Visual hallucinations.    Skin: Skin is warm and dry.   Psychiatric: Her affect is labile. Her speech is rapid and/or pressured. She is agitated and actively  hallucinating. She exhibits abnormal recent memory and abnormal remote memory.       Significant Labs: All pertinent labs within the past 24 hours have been reviewed.    Significant Imaging: I have reviewed all pertinent imaging results/findings within the past 24 hours.  I have reviewed and interpreted all pertinent imaging results/findings within the past 24 hours.    Assessment/Plan:      * PUD (peptic ulcer disease)    Patient had EGD on 7/22 and 7/23. 7/22- unable to visualize secondary to blood. Transfused blood. Second EGD on 7/23- esophageal and gastric ulcers with cautery and clips placed to gastric ulcer. Received further blood on 7/23- total of 4 units since admission. Discussed case with GI. Further bleeding?- surgery/IR consult. H/H now stable. Off all anticoagulation. Change CBC to every other day. Changed PPI to BID. Continue via IV as patient likely to decline PO dose.           Metabolic encephalopathy    Unclear etiology at first given Na level was normal, but repeat level markedly different with hyponatremia of 117 and repeat confirmed to be true value. Workup with head CT and MRI negative, non-focal exam. Recent admit for urinary retention made infectious process a consideration but all cultures are negative. Is better, but I believe she may still be experience side effect from sedation given for EGD. Patient was not sleeping well at nights but sleeping all day. Attempting to have patient out of bed today to have her more alert, however she is adamantly declining any activity at all. Will continue to encourage to at least be un in chair for meals. Ramelteon did not work as she only slept intermittently last night. Is more alert today however. Will consult psych for advise on this. It appears she has been encephalopathic since admission, however it was purely metabolic at first, now this is delirium of other cause. Use seroquel?? I will check UA and CXR. Is afebrile. Frequent reorientation. Blinds  open during the day time, have family around at all times          Oropharyngeal dysphagia    Continued pureed diet with nectar thickened liquids. Was not coughing as much but only eating 25% of meals. Coughed more today. Speech attempted MBBS but unsuccessful as patient declined study. Will continue current diet but family is aware that there are risks for aspiration          Paroxysmal atrial fibrillation    Off anticoagulation. Increased metoprolol. Still slightly tachy but will treat SOB as possible culprit.           Physical deconditioning    consulted PT/OT. Recommending SNF. PPD placed.  aware          DNR (do not resuscitate)    Reviewed with patient and family, she is DNR. Palliative care consulted for counseling and LaPOST filled out. Palliative care following.           Urinary retention    Pt was here last week for urinary retention and started on flomax. Bladder scan and straight cath if needed.        Chronic diastolic congestive heart failure    With pulmonary edema likely due to hypoalbuminemia and fluids. This may be a sign of slight decompensation. Trial of lasix today but will continue gentle fluids as she is barely eating and is at risk for hypoglycemia.           VTE Risk Mitigation         Ordered     Medium Risk of VTE  Once      07/21/17 0149     Reason for No Pharmacological VTE Prophylaxis  Once      07/21/17 0149        Patient is not showing significant improvement overall. Is very weak and delirious. Is DNR. I will meet with son and daughter tomorrow to reassess goals of care. Psych consulted for assistance on delirium.     Marisela Mabry MD  Department of Hospital Medicine   [facility]

## 2017-07-27 NOTE — PLAN OF CARE
Problem: Occupational Therapy Goal  Goal: Occupational Therapy Goal  Goals to be met by: 8/8/2017     Patient will increase functional independence with ADLs by performing:    UE Dressing with Stand-by Assistance.  LE Dressing with Minimal Assistance.  Grooming while seated with Contact Guard Assistance.  Sitting at edge of bed x10 minutes with Stand-by Assistance.  Supine to sit with Contact Guard Assistance.  Upper extremity exercise program with assistance as needed.     Outcome: Ongoing (interventions implemented as appropriate)  Patient with increased participation in therapy, able to initiate more to tranfer to EOB and chair. SINAN Snow, MS

## 2017-07-27 NOTE — CONSULTS
Terrence signed, Loc[facility] called in, terrence faxed to [facility]..Heather Correa RN, BSN, [facility]  7/26/2017

## 2017-07-27 NOTE — ASSESSMENT & PLAN NOTE
Patient has a delirium likely due to hyponatremia and/or loss of blood.  Regardless, I would not recommend medication management in her care at this time due to her frailty.  Sedating medications may take longer to metabolize and therefore have prolonged effect.  A lot of psychiatric medications also have side effect of hyponatremia.  Hopefully this delirium will clear over the course of the next few days but unfortunately she may be at a new lower than normal functioning baseline.  I would not recommend for her to return home alone.  Either she should move in with family, family move in with her, or seek other living arrangements.  Depending on the severity of her medical situation, hospice may also be a consideration.  Agree with family interview/meeting.

## 2017-07-27 NOTE — ASSESSMENT & PLAN NOTE
With pulmonary edema likely due to hypoalbuminemia and fluids. This may be a sign of slight decompensation. Trial of lasix today but will continue gentle fluids as she is barely eating and is at risk for hypoglycemia.

## 2017-07-27 NOTE — PT/OT/SLP PROGRESS
"Speech Language Pathology  Dysphagia Treatment    Rachael Almaguer   MRN: [id number]   [id number]/[id number] A    Admitting Diagnosis: PUD (peptic ulcer disease)    Diet recommendations: Solid Diet Level: Puree  Liquid Diet Level: Nectar Thick   Feed only when awake/alert,   No straws,   HOB to 90 degrees,   Small bites/sips,   1 bite/sip at a time,   Remain upright 30 minutes post meal,   Meds crushed in puree,   Assistance with meals   Assistance with thickening liquids    Continue to monitor for signs and symptoms of aspiration and discontinue oral feeding should you notice any of the following: watery eyes, reddened facial area, wet vocal quality, increased work of breathing, change in respiratory status, increased congestion, coughing, fever, etc.    **MBSS to be completed this AM to assess swallowing function and determine the least restrictive and safest po diet with updated goals to follow pending results.     SLP Treatment Date: 07/27/17  Speech Start Time: 0850     Speech Stop Time: 0910     Speech Total (min): 20 min       TREATMENT BILLABLE MINUTES:  Seld Care/Home Management Training 20    Has the patient been evaluated by SLP for swallowing? : Yes  Keep patient NPO?: No   General Precautions: Standard, aspiration, nectar thick, pureed diet, hearing impaired, fall  Current Respiratory Status: nasal cannula       Subjective:  Pt awake and agitated yelling at SLP. Palliative RN entered room at end of assessment.      Objective:   Pt seen to assess tolerance of diet. Pt with noted wet vocal quality. When SLP cues pt to cough in attempt to clear, pt presented with a weak cough and continued gurgly vocal quality. Per daughter, pt with decreased tolerance of diet this AM. Pt noted to cough while eating breakfast. Daughter reported she did thicken liquids. Did not note if coughing was s/p puree or liquid trials. Pt refusing po trials with SLP at this time stating, "No, I'm not hungry. I'm not doing it." Due to reported " "decrease in tolerance of diet and noted wet vocal quality, pt would benefit form a MBSS to objectively r/o aspiration and determine the least restrictive and safest po diet. When SLP was explaining possible MBSS this date, she stated, "Are you hard headed? No. I'm not doing it. I have a cold." SLP  Educated family on plan for MBSS. Should pt refuse, SLP discussed safest diet recommendations with daughter with risk to aspirate. She verbalized understanding. Recommendations discussed with Dr. Hollis and RN (Mary).     Safest diet recommendations from previous SLP assessment should pt refuse MBSS:   Pureed diet, NECTAR THICK liquids, crushed po medications   Aspiration precautions:    · 1:1 assistance with meals  · Small sips and bites  · Upright for all po intake and remain upright for 20-30 minutes s/p po intake  · Avoid straws  · 1 bite/sip at a time  · Feed only when awake and alert  · Continue to monitor for signs and symptoms of aspiration and discontinue oral feeding should you notice any of the following: watery eyes, reddened facial area, wet vocal quality, increased work of breathing, change in respiratory status, increased congestion, coughing, fever, etc.  Note: Despite safest diet recommendations and aspiration precautions, pt remains at a high risk to aspirate.     Assessment:  Rachael Almaguer is a 89 y.o. female with a medical diagnosis of PUD (peptic ulcer disease) and presents with Dysphagia. Pt would benefit from a MBSS to objectively r/o aspiration and determine the least restrictive and safest po diet.     Discharge recommendations:       Goals:    SLP Goals        Problem: SLP Goal    Goal Priority Disciplines Outcome   SLP Goal     SLP Ongoing (interventions implemented as appropriate)   Description:  Short term goals:  1) patient will tolerate puree with nectar thick liquids without evidence of aspiration, weight loss or dehydration  2) patient will utilize compensatory strategies with max " assistance for safe effective po intake                      Plan:   Patient to be seen Therapy Frequency: 3 x/week   Plan of Care expires: 08/16/17  Plan of Care reviewed with: patient, daughter  SLP Follow-up?: Yes  SLP - Next Visit Date: 07/27/17           STAR Jimenez, CCC-SLP  07/27/2017

## 2017-07-27 NOTE — PROGRESS NOTES
"PALLIATIVE CARE PROGRESS NOTE:    Brief bedside visit; daughter-in-law present, and ST as well.  Patient yelling at me to "get them out of here."  Very agitated, paranoid type speech.  Calms a little to reassurance and soothing voice, but later in visit patient accusing me of "being on their side" when I explained the doctor wants to have a swallow study done.  She believes her family is "doing this."   She states she was up all day yesterday cooking (cabbage) and ate everything herself.  She is oriented to self, and "hospital."      Discussed with daughter-in-law plan for team meeting tomorrow and would like to include patient's son, Se.      Spoke to bedside RN Mary who states per report from night shift that the patient only slept off and on during the night, with frequent awakening.  The nurse also states patient was agitated during morning assessment and tried to hit her.      Left message for son Se Almaguer ([phone]) to set up meeting with Palliative Care and Dr. Mabry, patient's daughter, and daughter-in-law.  Awaiting call back.    Discussed above with Dr. Mabry.    Viridiana Chrery, BSN, RN, CCRN, PN   Palliative Care Nurse Coordinator   [facility]  [phone]        "

## 2017-07-27 NOTE — PROGRESS NOTES
Trying to talk with patient but she states, Leave me alone or just kill me. Also hitting at nurse when trying to access her.

## 2017-07-27 NOTE — PLAN OF CARE
Problem: Physical Therapy Goal  Goal: Physical Therapy Goal  Goals to be met by: 17    Patient will increase functional independence with mobility by performin. Supine to sit with Moderate Assistance  2. Sit to stand transfer with Moderate Assistance  3. Gait x50ft with RW and moderate assistance  4. Lower extremity exercise program x30 reps per handout, with assistance as needed      Outcome: Ongoing (interventions implemented as appropriate)    Patient progressing towards goals.

## 2017-07-27 NOTE — PT/OT/SLP PROGRESS
Speech Language Pathology  Education    Rachael Almaguer   MRN: [id number]   [id number]/[id number] A    Admitting Diagnosis: PUD (peptic ulcer disease)    Diet recommendations: Solid Diet Level: Puree  Liquid Diet Level: Nectar Thick   Safest diet recommendations from previous SLP assessment:  Pureed diet, NECTAR THICK liquids, crushed po medications   Aspiration precautions:    · 1:1 assistance with meals  · Small sips and bites  · Upright for all po intake and remain upright for 20-30 minutes s/p po intake  · Avoid straws  · 1 bite/sip at a time  · Feed only when awake and alert  · Continue to monitor for signs and symptoms of aspiration and discontinue oral feeding should you notice any of the following: watery eyes, reddened facial area, wet vocal quality, increased work of breathing, change in respiratory status, increased congestion, coughing, fever, etc.  Note: Despite safest diet recommendations and aspiration precautions, pt remains at a high risk to aspirate all po intake.     SLP Treatment Date: 07/27/17  Speech Start Time: 0950     Speech Stop Time: 1020     Speech Total (min): 30 min       TREATMENT BILLABLE MINUTES:  Seld Care/Home Management Training 30    Has the patient been evaluated by SLP for swallowing? : Yes  Keep patient NPO?: No   General Precautions: Standard, aspiration, hearing impaired, fall, pureed diet, nectar thick  Current Respiratory Status: nasal cannula       Subjective:  Pt agreed to go to testing when picked up by transport. Once in radiology department, pt became agitated and yelling at staff.        Objective:   MBSS attempted. Pt transported to [facility] and moved to fluoro chair, however, adamantly refused to swallow. All trials fell anteriorly out of mouth. SLP provided family education on the s/s and risks of aspiration, safest diet recommendations, aspiration precautions, and POC. All questions and concerns addressed. Daughter-in-law verbalized understanding. Communicated recs with MD. ZHU  will continue to follow to assess tolerance of safest diet recommendations and compliance with safe swallowing precautions.      Assessment:  Rachael Almaguer is a 89 y.o. female with a medical diagnosis of PUD (peptic ulcer disease) and presents with Dysphagia. ST will continue to follow to provide education and assess tolernace of safest diet recommendations.     Discharge recommendations:       Goals:    SLP Goals        Problem: SLP Goal    Goal Priority Disciplines Outcome   SLP Goal     SLP Ongoing (interventions implemented as appropriate)   Description:  Short term goals:  1) patient will tolerate puree with nectar thick liquids without evidence of aspiration, weight loss or dehydration  2) patient will utilize compensatory strategies with max assistance for safe effective po intake                      Plan:   Patient to be seen Therapy Frequency: 3 x/week   Plan of Care expires: 08/16/17  Plan of Care reviewed with: patient (daughter-in-law, MD)  SLP Follow-up?: Yes  SLP - Next Visit Date: 07/28/17           STAR Jimenez, CCC-SLP  07/27/2017

## 2017-07-27 NOTE — PLAN OF CARE
A Family meeting is scheduled tomorrow 7/28/17 with Palliative Care Nurse, attending MD, family, and CLAUDIA.        07/27/17 2493   Discharge Reassessment   Assessment Type Discharge Planning Reassessment   Discharge plan remains the same: No   Discharge Plan A Inpatient Hospice   Discharge Plan B Home with family   Change in patient condition or support system Yes   Explained to the the patient/caregiver why the discharge planned changed: No   Involved the patient/caregiver in establishing a new discharge plan: No

## 2017-07-27 NOTE — PLAN OF CARE
Problem: SLP Goal  Goal: SLP Goal  Short term goals:  1) patient will tolerate puree with nectar thick liquids without evidence of aspiration, weight loss or dehydration  2) patient will utilize compensatory strategies with max assistance for safe effective po intake    Outcome: Ongoing (interventions implemented as appropriate)  MBSS attempted. Pt transported to fluoro and moved to fluoro chair, however, adamantly refused to swallow. All trials fell anteriorly out of mouth. SLP provided family education on the s/s and risks of aspiration, safest diet recommendations, and POC. All questions and concerns addressed. Daughter-in-law verbalized understanding. Communicated recs with MD. ST will continue to follow to assess tolerance of safest diet recommendations and compliance with safe swallowing precautions.   LESLIE Barahona., CCC-SLP  07/27/2017

## 2017-07-27 NOTE — PROGRESS NOTES
PALLIATIVE CARE PROGRESS NOTE:    Family meeting with Palliative Care, Dr. Mabry, patient's son and daughter is scheduled for tomorrow, Friday 7/28 at 10 a.m.      Left message regarding above for CLAUDIA Isi who can hopefully attend.  Notified Dr. Mabry of above.      Viridiana Cherry, BSN, RN, CCRN, CHPN   Palliative Care Nurse Coordinator   [facility]  [phone]

## 2017-07-27 NOTE — HPI
"Patient Rachael Almaguer presents to the hospital with hyponatremia, confusion, blood loss.  It has been a complicated hospitalization with ongoing delirium.  She initially was placed on olanzapine to help with her confusion and agitation but this was stopped when she was noted to be sleeping most of the time.  Patient is still very sleepy in her room and difficult to awaken.  Once awake, she is a difficult historian but is oriented to "hospital" in her name.  She is able to give me her date of birth and home address.  She is also able to name her daughter and daughter-in-law at bedside.  Patient then goes off with tangential rambling about how she cooked this morning and really enjoyed eating pears.  She also says that she sees people, particularly little children running around the room and hiding behind the TV.  She says that these little children hide behind her TV and prevent her from going to sleep at night.  She is not able to provide much more in conversation.  Family at bedside says that patient was relatively self-sufficient a year ago with gradual decline over the course of the past few months and that she lives alone.  She has been very adamant about returning back to her house.  She is very sedated and falls back asleep easily.  Family reports no psychiatric history or medications.  "

## 2017-07-27 NOTE — PROGRESS NOTES
To endo per bed with escort and family member. Patient yelling out and fussing but states ok for exam. at this time.

## 2017-07-27 NOTE — ASSESSMENT & PLAN NOTE
Off anticoagulation. Increased metoprolol. Still slightly tachy but will treat SOB as possible culprit.

## 2017-07-27 NOTE — ASSESSMENT & PLAN NOTE
Continued pureed diet with nectar thickened liquids. Was not coughing as much but only eating 25% of meals. Coughed more today. Speech attempted MBBS but unsuccessful as patient declined study. Will continue current diet but family is aware that there are risks for aspiration

## 2017-07-28 PROBLEM — J81.1 PULMONARY EDEMA: Status: ACTIVE | Noted: 2017-07-28

## 2017-07-28 PROBLEM — R44.1 VISUAL HALLUCINATION: Status: ACTIVE | Noted: 2017-07-28

## 2017-07-28 LAB
ALBUMIN SERPL BCP-MCNC: 2.1 G/DL
ALP SERPL-CCNC: 57 U/L
ALT SERPL W/O P-5'-P-CCNC: 27 U/L
ANION GAP SERPL CALC-SCNC: 1 MMOL/L
AST SERPL-CCNC: 34 U/L
BASOPHILS # BLD AUTO: 0.02 K/UL
BASOPHILS NFR BLD: 0.2 %
BILIRUB SERPL-MCNC: 2.3 MG/DL
BUN SERPL-MCNC: 22 MG/DL
CALCIUM SERPL-MCNC: 7.8 MG/DL
CHLORIDE SERPL-SCNC: 107 MMOL/L
CO2 SERPL-SCNC: 29 MMOL/L
CREAT SERPL-MCNC: 0.7 MG/DL
DIFFERENTIAL METHOD: ABNORMAL
EOSINOPHIL # BLD AUTO: 0.2 K/UL
EOSINOPHIL NFR BLD: 1.4 %
ERYTHROCYTE [DISTWIDTH] IN BLOOD BY AUTOMATED COUNT: 18.4 %
EST. GFR  (AFRICAN AMERICAN): >60 ML/MIN/1.73 M^2
EST. GFR  (NON AFRICAN AMERICAN): >60 ML/MIN/1.73 M^2
GLUCOSE SERPL-MCNC: 106 MG/DL
HCT VFR BLD AUTO: 33.8 %
HGB BLD-MCNC: 10.9 G/DL
LYMPHOCYTES # BLD AUTO: 1.2 K/UL
LYMPHOCYTES NFR BLD: 11.1 %
MAGNESIUM SERPL-MCNC: 1.7 MG/DL
MCH RBC QN AUTO: 32.4 PG
MCHC RBC AUTO-ENTMCNC: 32.2 G/DL
MCV RBC AUTO: 101 FL
MONOCYTES # BLD AUTO: 1.4 K/UL
MONOCYTES NFR BLD: 12.9 %
NEUTROPHILS # BLD AUTO: 8 K/UL
NEUTROPHILS NFR BLD: 74.4 %
PHOSPHATE SERPL-MCNC: 2.3 MG/DL
PLATELET # BLD AUTO: 100 K/UL
PMV BLD AUTO: 9.5 FL
POTASSIUM SERPL-SCNC: 4.6 MMOL/L
PROT SERPL-MCNC: 5.2 G/DL
RBC # BLD AUTO: 3.36 M/UL
SODIUM SERPL-SCNC: 137 MMOL/L
WBC # BLD AUTO: 10.8 K/UL

## 2017-07-28 PROCEDURE — 63600175 PHARM REV CODE 636 W HCPCS: Performed by: INTERNAL MEDICINE

## 2017-07-28 PROCEDURE — 83735 ASSAY OF MAGNESIUM: CPT

## 2017-07-28 PROCEDURE — 97535 SELF CARE MNGMENT TRAINING: CPT

## 2017-07-28 PROCEDURE — 85025 COMPLETE CBC W/AUTO DIFF WBC: CPT

## 2017-07-28 PROCEDURE — 11000001 HC ACUTE MED/SURG PRIVATE ROOM

## 2017-07-28 PROCEDURE — 97530 THERAPEUTIC ACTIVITIES: CPT

## 2017-07-28 PROCEDURE — 84100 ASSAY OF PHOSPHORUS: CPT

## 2017-07-28 PROCEDURE — 92526 ORAL FUNCTION THERAPY: CPT

## 2017-07-28 PROCEDURE — 80053 COMPREHEN METABOLIC PANEL: CPT

## 2017-07-28 PROCEDURE — 25000003 PHARM REV CODE 250: Performed by: INTERNAL MEDICINE

## 2017-07-28 PROCEDURE — C9113 INJ PANTOPRAZOLE SODIUM, VIA: HCPCS | Performed by: INTERNAL MEDICINE

## 2017-07-28 RX ORDER — FUROSEMIDE 10 MG/ML
20 INJECTION INTRAMUSCULAR; INTRAVENOUS ONCE
Status: COMPLETED | OUTPATIENT
Start: 2017-07-28 | End: 2017-07-28

## 2017-07-28 RX ORDER — AMOXICILLIN AND CLAVULANATE POTASSIUM 875; 125 MG/1; MG/1
1 TABLET, FILM COATED ORAL EVERY 12 HOURS
Status: DISCONTINUED | OUTPATIENT
Start: 2017-07-28 | End: 2017-08-01 | Stop reason: CLARIF

## 2017-07-28 RX ADMIN — FUROSEMIDE 20 MG: 10 INJECTION, SOLUTION INTRAMUSCULAR; INTRAVENOUS at 10:07

## 2017-07-28 RX ADMIN — PANTOPRAZOLE SODIUM 40 MG: 40 INJECTION, POWDER, FOR SOLUTION INTRAVENOUS at 08:07

## 2017-07-28 RX ADMIN — AMOXICILLIN AND CLAVULANATE POTASSIUM 1 TABLET: 875; 125 TABLET, FILM COATED ORAL at 08:07

## 2017-07-28 RX ADMIN — MICONAZOLE NITRATE: 20 CREAM TOPICAL at 08:07

## 2017-07-28 RX ADMIN — MICONAZOLE NITRATE: 20 CREAM TOPICAL at 10:07

## 2017-07-28 RX ADMIN — FUROSEMIDE 20 MG: 10 INJECTION, SOLUTION INTRAMUSCULAR; INTRAVENOUS at 08:07

## 2017-07-28 RX ADMIN — PANTOPRAZOLE SODIUM 40 MG: 40 INJECTION, POWDER, FOR SOLUTION INTRAVENOUS at 09:07

## 2017-07-28 RX ADMIN — CALCIUM GLUCONATE 1000 MG: 94 INJECTION, SOLUTION INTRAVENOUS at 09:07

## 2017-07-28 RX ADMIN — METOPROLOL TARTRATE 50 MG: 50 TABLET ORAL at 08:07

## 2017-07-28 NOTE — ASSESSMENT & PLAN NOTE
Pt was here last week for urinary retention and started on flomax. Bladder scan and straight cath if needed. Has incontinence. Renal funciton stable

## 2017-07-28 NOTE — SUBJECTIVE & OBJECTIVE
Interval History: slept intermittently last night. Had a dose of ramelteon last night. Is evidently delirious and at times agitated. Declining to sit in chair or to be moved at all. Is more alert than yesterday however. Appears short of breath. Also coughing after meals. Also with visual hallucination. Gurgling. Daughter in law at bedside.     Review of Systems   HENT: Negative.    Eyes: Negative.    Respiratory: Negative.    Cardiovascular: Negative.    Gastrointestinal: Negative.    Endocrine: Negative.    Genitourinary: Negative.    Neurological: Positive for weakness.   Hematological: Negative.    Psychiatric/Behavioral: Positive for agitation, behavioral problems, confusion, hallucinations and sleep disturbance.     Objective:     Vital Signs (Most Recent):  Temp: 98.3 °F (36.8 °C) (07/28/17 0400)  Pulse: 91 (07/28/17 0657)  Resp: 18 (07/28/17 0400)  BP: (!) 115/54 (07/28/17 0400)  SpO2: 98 % (07/28/17 0400) Vital Signs (24h Range):  Temp:  [97.6 °F (36.4 °C)-98.7 °F (37.1 °C)] 98.3 °F (36.8 °C)  Pulse:  [] 91  Resp:  [18-20] 18  SpO2:  [95 %-98 %] 98 %  BP: (100-148)/(54-94) 115/54     Weight: 66 kg (145 lb 8.1 oz)  Body mass index is 24.21 kg/m².    Intake/Output Summary (Last 24 hours) at 07/28/17 0850  Last data filed at 07/28/17 0600   Gross per 24 hour   Intake              600 ml   Output                0 ml   Net              600 ml      Physical Exam   Constitutional: She appears well-developed.   Thin and frail.   HENT:   Head: Normocephalic.   +temporal wasting, hard of hearing.   Eyes: EOM are normal. Pupils are equal, round, and reactive to light.   Neck: Normal range of motion. Neck supple.   Cardiovascular: Normal rate and regular rhythm.    Pulmonary/Chest: Effort normal. She has rales.   Using accessory muscle when speaking   Abdominal: Soft. She exhibits no distension.   Musculoskeletal: Normal range of motion. She exhibits no edema.   Neurological: She is alert. No cranial nerve  deficit. She exhibits normal muscle tone.   Disoriented to time, but oriented to place and person. Visual hallucinations.    Skin: Skin is warm and dry.   Psychiatric: Her affect is labile. Her speech is not rapid and/or pressured. She is agitated and actively hallucinating. She exhibits abnormal recent memory and abnormal remote memory.   Can state her name and address, also that she is in the hospital, but then hallucinates   Nursing note and vitals reviewed.      Significant Labs: All pertinent labs within the past 24 hours have been reviewed.    Significant Imaging: I have reviewed all pertinent imaging results/findings within the past 24 hours.  I have reviewed and interpreted all pertinent imaging results/findings within the past 24 hours.

## 2017-07-28 NOTE — PROGRESS NOTES
"PALLIATIVE CARE PROGRESS NOTE:    Brief bedside visit.  Patient very awake, oriented to self and hospital.  Stating she is "okay" with doing swallow study today, but she remains irritable with interventions.  Denies pain.  Still hallucinating.    Family meeting with Palliative Care, Dr. Mabry, CLAUDIA Snowden, patient's daughter Teodora, sons Se daughter-in-law Heather, and sons Se and Jourdan Jr with his wife Heather, and son David by speaker phone.      Dr. Mabry discussed clinical status update and reviewed complex issues/comorbiditity and the impact the delirium is having on her recovery.  Patient seems to have agreed to have MBSS done today to assess for swallowing (she refused yesterday).      We discussed options of SNF, home with sitters, as well as hospice. They do not want to see patient suffer and are now understanding patient feeling burdened by aggressive care.  Family also aware now that patient unable to return home with former level of independence. The only family member who lives in the area is berry Saez and his wife has significant dementia.  Other family members also have health issues as barrier to providing 24 hour care in patient's home. They state NH is not an option and patient would not want this.  Patient has been adamant about not leaving her own home.  They will take some time over the next few days to discuss patient's needs and come up with a plan (especially if she does not go to SNF). Isi provided information for family regarding her Medicare coverage, as well as information about sitters.      Provided literature "Hard Choices For [place] People" for berry Saez.  This was provided to other family members on earlier visits.  Ample time was allowed for discussion of concerns and feelings.  All questions were asked and answered to their satisfaction.  Emotional support provided.      Plan:  Continuing supportive care for now.  Family will discuss their options over the next few " days.    Viridiana Cherry, BSN, RN, CCRN, CHPN   Palliative Care Nurse Coordinator   [facility]  [phone]

## 2017-07-28 NOTE — PLAN OF CARE
Problem: Patient Care Overview  Goal: Plan of Care Review  Outcome: Ongoing (interventions implemented as appropriate)  ..Pt AAO to self only, Extremely Native to bilateral ears, ,denies pain, VS stable, Skin soft anf fragils with +2 generalized edema, Bilateral arms weeping at times, Tolerating diet well, Safety Maintained, Free of falls, SR up x2, Call Simms in reach. Bed in low position, No issues during shift. Continue plan of care.

## 2017-07-28 NOTE — PROGRESS NOTES
CLAUDIA, Palliative Care Nurse, and attending MD met with family to address goals of care, discharge plan, and medical condition of patient. Attending MD went into detail with family as to patient prognosis and why she would be appropriate for comfort measures. CLAUDIA explained to family the different options available for hospice services. Patient was previously living at home alone; attending MD expressed patient can no longer be alone will require 24 hour care. Each family member lives out of state except for 1 son and all parties involved stated they do not want to place patient into a nursing home and financially family can not afford sitters. Attending MD requested for family to discuss what they would like to do about who will care for patient when discharged from the hospital.

## 2017-07-28 NOTE — ASSESSMENT & PLAN NOTE
Off anticoagulation. Increased metoprolol. Still slightly tachy but will treat SOB as possible culprit. Management of pulm edema as above

## 2017-07-28 NOTE — PLAN OF CARE
Problem: SLP Goal  Goal: SLP Goal  Short term goals:  1) patient will tolerate puree with HONEY thick liquids without evidence of aspiration, weight loss or dehydration -modified goal 7/28  2) patient will utilize compensatory strategies with max assistance for safe effective po intake    Outcome: Ongoing (interventions implemented as appropriate)  Pt seen for ongoing swallowing assessment to determine the least restrictive and safest po diet. Pt refusing MBSS again this date. Extensive family education provided. ST will continue to follow.   LESLIE Barahona., CCC-SLP  07/28/2017

## 2017-07-28 NOTE — ASSESSMENT & PLAN NOTE
Unclear etiology at first given Na level was normal, but repeat level markedly different with hyponatremia of 117 and repeat confirmed to be true value. Workup with head CT and MRI negative, non-focal exam. Recent admit for urinary retention made infectious process a consideration but all cultures are negative. Is better, but I believe she may still be experience side effect from sedation given for EGD. Patient was not sleeping well at nights but sleeping all day.  It appears she has been encephalopathic since admission, however it was purely metabolic at first, now this is delirium of other cause. Attempting to have patient out of bed today to have her more alert, however she is adamantly declining any activity at all. Will continue to encourage to at least be un in chair for meals. Ramelteon did not work as she only slept intermittently last night. Is alert today but still very delirious. Psych recommended not to use medication for delirium as these may cause lethargy and hyponatremia. Unable to obtain sample for UA . CXR with pulm edema. Is getting lasix and fluids stopped as she is evidently symptomatic. Is afebrile. Frequent reorientation. Blinds open during the day time, have family around at all times

## 2017-07-28 NOTE — ASSESSMENT & PLAN NOTE
Patient had EGD on 7/22 and 7/23. 7/22- unable to visualize secondary to blood. Transfused blood. Second EGD on 7/23- esophageal and gastric ulcers with cautery and clips placed to gastric ulcer. Received further blood on 7/23- total of 4 units since admission. Now resolved. Off all anticoagulation. Change CBC to every other day. Changed PPI to BID. Continue via IV as patient likely to decline PO dose.

## 2017-07-28 NOTE — PT/OT/SLP PROGRESS
Speech Language Pathology  Dysphagia Treatment    Rachael Almaguer   MRN: [id number]   [id number] A    Admitting Diagnosis: PUD (peptic ulcer disease)    Diet recommendations: Solid Diet Level: Puree  Liquid Diet Level: HONEY Thick   Safest diet recommendations:  Pureed diet, HONEY THICK liquids, crushed po medications   Aspiration precautions:    · 1:1 assistance with meals  · Small sips and bites  · Upright for all po intake and remain upright for 20-30 minutes s/p po intake  · Avoid straws  · 1 bite/sip at a time  · Feed only when awake and alert  · Continue to monitor for signs and symptoms of aspiration and discontinue oral feeding should you notice any of the following: watery eyes, reddened facial area, wet vocal quality, increased work of breathing, change in respiratory status, increased congestion, coughing, fever, etc.  Note: Despite safest diet recommendations and aspiration precautions, pt remains at a risk to aspirate all po intake.     SLP Treatment Date: 07/28/17  Speech Start Time: 1120     Speech Stop Time: 1152     Speech Total (min): 32 min       TREATMENT BILLABLE MINUTES:  Treatment Swallowing Dysfunction 16 and Seld Care/Home Management Training 16    Has the patient been evaluated by SLP for swallowing? : Yes  Keep patient NPO?: No   General Precautions: Standard, aspiration, fall, honey thick, pureed diet, hearing impaired  Current Respiratory Status: nasal cannula       Subjective:  Pt awake and cooperative with SLP. Multiple family members present in waiting room.     Pain/Comfort  Pain Rating 1: 0/10    Objective:     Pt seen for ongoing swallowing assessment to determine the least restrictive and safest po diet. SLP assessed pt with thin, nectar, and honey thick liquids and apple sauce bites. Pt presented with an inconsistent weak cough throughout all trials. Vocal quality remained clear this date. Pt initially reported she was willing to participate in MBSS, but that she did not want to be  "moved into a chair. When SLP educated pt on needing to move into fluoro chair for MBSS, she stated, "Well then I'm not doing it."    Extensive family education provided. Topics include:  -Safest diet recs  -Continued risk to aspirate all po intake  -SLP POC  -Safe swallowing precautions  -S/s and risks of aspiration   -How to use thickener  All questions and concerns were addressed. Family asked appropriate questions. Family verbalized understanding of all discussed.       Assessment:  Rachael Almaguer is a 89 y.o. female with a medical diagnosis of PUD (peptic ulcer disease) and presents with Dysphagia. ST will continue to follow for family education and to assess tolerance of safest diet recommendations.     Discharge recommendations: Discharge Facility/Level Of Care Needs: nursing facility, skilled     Goals:    SLP Goals        Problem: SLP Goal    Goal Priority Disciplines Outcome   SLP Goal     SLP Ongoing (interventions implemented as appropriate)   Description:  Short term goals:  1) patient will tolerate puree with HONEY thick liquids without evidence of aspiration, weight loss or dehydration -modified goal 7/28  2) patient will utilize compensatory strategies with max assistance for safe effective po intake                       Plan:   Patient to be seen Therapy Frequency: 3 x/week   Plan of Care expires: 08/16/17  Plan of Care reviewed with: patient, family  SLP Follow-up?: Yes  SLP - Next Visit Date: 07/31/17           STAR Jimenez, CCC-SLP  07/28/2017    "

## 2017-07-28 NOTE — PT/OT/SLP PROGRESS
Physical Therapy  Treatment    Rachael Almaguer   MRN: [id number]   Admitting Diagnosis: PUD (peptic ulcer disease)    PT Received On: 07/28/17  PT Start Time: 1618     PT Stop Time: 1634    PT Total Time (min): 16 min       Billable Minutes:  Therapeutic Activity  16     Treatment Type: Treatment  PT/PTA: PT     PTA Visit Number: 0       General Precautions: Standard, aspiration, fall, honey thick, pureed diet, hearing impaired  Orthopedic Precautions: N/A   Braces: N/A    Subjective:  Communicated with nurse Plummer prior to session.  Patient agreeable to participate in PT session.     Pain/Comfort  Pain Rating : 0/10    Objective:   Patient found with: PICC line, telemetry, oxygen    Functional Mobility:  Bed Mobility:   Supine to Sit: Moderate Assistance, With side rail    Transfers:  Sit <> Stand Assistance: Minimum Assistance  Sit <> Stand Assistive Device: Other (see comments) (HHA)    Bed <> Chair Technique: Stand Pivot  Bed <> Chair Assistance: Minimum Assistance  Bed <> Chair Assistive Device: Other (see comments) (HHA)    Balance:   Static Sit: FAIR+: Able to take MINIMAL challenges from all directions  Dynamic Sit: FAIR: Cannot move trunk without losing balance  Static Stand: POOR+: Needs MINIMAL assist to maintain  Dynamic stand: POOR: N/A     AM-PAC 6 CLICK MOBILITY  How much help from another person does this patient currently need?   1 = Unable, Total/Dependent Assistance  2 = A lot, Maximum/Moderate Assistance  3 = A little, Minimum/Contact Guard/Supervision  4 = None, Modified [place]/Independent    Turning over in bed (including adjusting bedclothes, sheets and blankets)?: 3  Sitting down on and standing up from a chair with arms (e.g., wheelchair, bedside commode, etc.): 3  Moving from lying on back to sitting on the side of the bed?: 2  Moving to and from a bed to a chair (including a wheelchair)?: 3  Need to walk in hospital room?: 2  Climbing 3-5 steps with a railing?: 1  Total Score:  14    AM-PAC Raw Score CMS G-Code Modifier Level of Impairment Assistance   6 % Total / Unable   7 - 9 CM 80 - 100% Maximal Assist   10 - 14 CL 60 - 80% Moderate Assist   15 - 19 CK 40 - 60% Moderate Assist   20 - 22 CJ 20 - 40% Minimal Assist   23 CI 1-20% SBA / CGA   24 CH 0% Independent/ Mod I     Patient left up in chair with all lines intact, call button in reach, nurse Kavitha notified and family present.    Assessment:  Rachael Almaguer is a 89 y.o. female with a medical diagnosis of PUD (peptic ulcer disease) and presents with decreased strength and impaired balance for functional mobility. She would continue to benefit from PT while in the hospital in order to get back to PLOF.     Rehab identified problem list/impairments: Rehab identified problem list/impairments: weakness, impaired endurance, impaired functional mobilty, gait instability, impaired balance, decreased lower extremity function, decreased safety awareness, impaired cognition, impaired cardiopulmonary response to activity    Rehab potential is fair.    Activity tolerance: Fair    Discharge recommendations: Discharge Facility/Level Of Care Needs: nursing facility, skilled     Barriers to discharge: Barriers to Discharge: Decreased caregiver support (patient lives alone )    Equipment recommendations: Equipment Needed After Discharge: wheelchair     GOALS:    Physical Therapy Goals        Problem: Physical Therapy Goal    Goal Priority Disciplines Outcome Goal Variances Interventions   Physical Therapy Goal     PT/OT, PT Ongoing (interventions implemented as appropriate)     Description:  Goals to be met by: 17    Patient will increase functional independence with mobility by performin. Supine to sit with Moderate Assistance  2. Sit to stand transfer with Moderate Assistance  3. Gait x50ft with RW and moderate assistance  4. Lower extremity exercise program x30 reps per handout, with assistance as needed                      PLAN:    Patient to be seen 6 x/week (saturday )  to address the above listed problems via gait training, therapeutic activities, therapeutic exercises  Plan of Care expires: 08/08/17  Plan of Care reviewed with: patient, family    Lily Walton, PT, [facility]  07/28/2017

## 2017-07-28 NOTE — PLAN OF CARE
Problem: Physical Therapy Goal  Goal: Physical Therapy Goal  Goals to be met by: 17    Patient will increase functional independence with mobility by performin. Supine to sit with Moderate Assistance  2. Sit to stand transfer with Moderate Assistance  3. Gait x50ft with RW and moderate assistance  4. Lower extremity exercise program x30 reps per handout, with assistance as needed      Outcome: Ongoing (interventions implemented as appropriate)    Patient more agreeable to participate in PT session today.

## 2017-07-28 NOTE — ASSESSMENT & PLAN NOTE
With pulmonary edema likely due to hypoalbuminemia and fluids. This may be a sign of slight decompensation. Stop fluids and check BG frequently as she has poor PO intake. Another dose of lasix now for pulmonary edema

## 2017-07-28 NOTE — PROGRESS NOTES
PALLIATIVE CARE PROGRESS NOTE:    Bedside visit.  Sedation vacation in progress.  Patient opening her eyes intermittently - made eye contact (but this is very subtle/subjective), did not track, but when I moved my head she did seem to turn a little to see me.  No commands yet (today).     Granddaughter Devin called to cancel family meeting that was scheduled for today at 11 a.m., and requested we move it to Monday or Tuesday (family struggling).  Provided emotional support and reassurance that no final decisions need to be made at this time, but want family input in regard's to direction of care and patient's wishes.   Notified Dr. Sun's MA Heather.    Viridiana Cherry, BSN, RN, CCRN, CHPN   Palliative Care Nurse Coordinator   [facility]  [phone]

## 2017-07-28 NOTE — ASSESSMENT & PLAN NOTE
Continued pureed diet with nectar thickened liquids. Only eating ~25% of meals. Cough with meals despite restrictions. Speech attempted MBBS but unsuccessful as patient declined study. Will continue current diet but family is aware that there are risks for aspiration. Patient wants ice cream. I will provide but with one-to-one assistance.

## 2017-07-28 NOTE — PT/OT/SLP PROGRESS
Physical Therapy  Treatment    Rachael Almaguer   MRN: [id number]   Admitting Diagnosis: PUD (peptic ulcer disease)    PT Received On: 07/27/17  PT Start Time: 1552     PT Stop Time: 1615    PT Total Time (min): 23 min       Billable Minutes:  Therapeutic Activity 12 co-treat with OT     Treatment Type: Treatment  PT/PTA: PT     PTA Visit Number: 0       General Precautions: Standard, aspiration, hearing impaired, fall, pureed diet, nectar thick  Orthopedic Precautions: N/A   Braces: N/A    Subjective:  Communicated with nurse Hernandez prior to session.  Patient only agreeable to get in bedside chair after son provided encouragement.     Pain/Comfort  Pain Rating : 0/10    Objective:   Patient found with: PICC line, telemetry, oxygen    Functional Mobility:  Bed Mobility:   Supine to Sit: Maximum Assistance  Sit to Supine: Maximum Assistance    Transfers:  Sit <> Stand Assistance: Moderate Assistance (2 person assist)    Gait:   Gait Distance: ~5 steps from bed to bedside chair. Patient declined to ambulate due to fatigue.   Assistance 1: Moderate assistance  Gait Assistive Device: Hand held assist (x2 people)  Gait Deviation(s): decreased maura, increased time in double stance, decreased velocity of limb motion, decreased step length, decreased toe-to-floor clearance, decreased weight-shifting ability    Balance:   Static Sit: GOOD-: Takes MODERATE challenges from all directions but inconsistently  Dynamic Sit: FAIR+: Maintains balance through MINIMAL excursions of active trunk motion  Static Stand: POOR+: Needs MINIMAL assist to maintain  Dynamic stand: POOR: N/A     AM-PAC 6 CLICK MOBILITY  How much help from another person does this patient currently need?   1 = Unable, Total/Dependent Assistance  2 = A lot, Maximum/Moderate Assistance  3 = A little, Minimum/Contact Guard/Supervision  4 = None, Modified [place]/Independent    Turning over in bed (including adjusting bedclothes, sheets and blankets)?: 2  Sitting  down on and standing up from a chair with arms (e.g., wheelchair, bedside commode, etc.): 2  Moving from lying on back to sitting on the side of the bed?: 2  Moving to and from a bed to a chair (including a wheelchair)?: 2  Need to walk in hospital room?: 2  Climbing 3-5 steps with a railing?: 1  Total Score: 11    AM-PAC Raw Score CMS G-Code Modifier Level of Impairment Assistance   6 % Total / Unable   7 - 9 CM 80 - 100% Maximal Assist   10 - 14 CL 60 - 80% Moderate Assist   15 - 19 CK 40 - 60% Moderate Assist   20 - 22 CJ 20 - 40% Minimal Assist   23 CI 1-20% SBA / CGA   24 CH 0% Independent/ Mod I     Patient left up in chair with all lines intact, call button in reach, nurse notified, and family present.    Assessment:  Rachael Almaguer is a 89 y.o. female with a medical diagnosis of PUD (peptic ulcer disease) and presents with decreased strength and impaired balance for functional mobility. She was only agreeable to get in bedside chair after son encouraged her. She declined to ambulate due to fatigue. She would continue to benefit from PT while in the hospital in order to address deficits listed below and get patient back to PLOF.     Rehab identified problem list/impairments: Rehab identified problem list/impairments: weakness, impaired endurance, impaired self care skills, impaired functional mobilty, gait instability, impaired balance, decreased lower extremity function, decreased safety awareness, impaired cognition, impaired cardiopulmonary response to activity    Rehab potential is fair.    Activity tolerance: Poor    Discharge recommendations: Discharge Facility/Level Of Care Needs: nursing facility, skilled (will need 24 hour assistance at discharge )     Barriers to discharge: Barriers to Discharge: Decreased caregiver support (patient lives alone )    Equipment recommendations: Equipment Needed After Discharge: wheelchair     GOALS:    Physical Therapy Goals        Problem: Physical Therapy  Goal    Goal Priority Disciplines Outcome Goal Variances Interventions   Physical Therapy Goal     PT/OT, PT Ongoing (interventions implemented as appropriate)     Description:  Goals to be met by: 17    Patient will increase functional independence with mobility by performin. Supine to sit with Moderate Assistance  2. Sit to stand transfer with Moderate Assistance  3. Gait x50ft with RW and moderate assistance  4. Lower extremity exercise program x30 reps per handout, with assistance as needed                     PLAN:    Patient to be seen 6 x/week  to address the above listed problems via gait training, therapeutic activities, therapeutic exercises  Plan of Care expires: 17  Plan of Care reviewed with: patient, son, daughter (daughter in law )    Lily Walton, PT, MOT  2017

## 2017-07-28 NOTE — PROGRESS NOTES
"[facility] Medicine  Progress Note    Patient Name: Rachael Almaguer  MRN: [id number]  Patient Class: IP- Inpatient   Admission Date: 7/20/2017  Length of Stay: 8 days  Attending Physician: Marisela Hollis MD  Primary Care Provider: Boris Carmichael MD        Subjective:     Principal Problem:PUD (peptic ulcer disease)    HPI:  Rachael Almaguer is a 89 y.o. with medical history HTN, CAD, and depression. She presented for evaluation of 5 day history of hallucinations since discharge from this hospital on Saturday. Patient was admitted for urinary retension. History taken from patients chart and patient.    Per patient, who surprisingly gives good history except for the visual hallucinations, she had been seeing people who are not there. She tells me that her 10 year old granddaughter is in the chair in the room during history taking. However, she is able to tell me that on Monday she saw her PCP who told her "you don't look good". She also saw her cardiologist (Dr. Whiting) today who told her to come to the hospital. Additionally, patient reports BLLE swelling.    Patient is found to have significant drop in H/H since 2 weeks ago was 14.1/41.0 now 10.5/30.9; dehydration with BUN/CR/GFR 57/1.8/24 compared to Jan her renal functions were normal.            Hospital Course:  Ms. Almaguer was admitted with AMS of uncertain etiology in the beginning, but quickly became clear after repeat BMP showed Na was 117 causing metabolic encephalopathy. Pt had negative head CT and further workup with MRI was unremarkable. Neurology following. Pt with hypovolemia causing hyponatremia and all diuretics stopped and steady correction with NS in progress with close monitoring of electrolytes. Patient was transferred to the ICU on 7/22 for acute GI bleed.  GI was consulted and took the patient to EGD the same day.  Blood was found in the entire stomach.  The patient received multiple units of blood. She continued " to bleed and the patient was taken back to EGD on 7/23. This showed non-bleeding esophageal ulcer as well as a non-bleeding gastric ulcer with cautery and clips placed.  GI recommended continued ICU monitoring.  Patient is DNR. Patient received blood again on 7/23. She received a total of 4 units of blood. The patient was very agitated on 7/23 and Zyprexa was started. The patient's H/H stabilized since 7/23 and the patient was sent to the floor on 7/24. No further evidence of GI bleed since. Speech, PT/OT were consulted on 7/24. Has declined significantly and at times is unwilling to participate as she feels exhausted. Speech recommended pureed with nectar thickened liquids, but unfortunately patient continues to show signs of aspiration as she tends to cough after meals. An MBBS was attempted on 7/27 but patient adamantly refused. Metoprolol increased 50 mg BID for better control of AFib. Anticoagulation on hold indefinitely. Patient and family aware that AFib increases risk for stroke when not on anticoagulation. Verbalized understanding. Also third-spacing and evidently with pulmonary edema. Trial of IV lasix but will need to continue very gently hydration with D5W as PO intake is extremely limited and is at risk for hypoglycemia. Delirium and visual hallucinations continue to be an issue. Barely sleeps at night despite efforts. Psych consulted for recs on this matter. I am concerned that patient will not achieve meaningful recovery in order to transfer to a SNF. Is evidently malnourished, is third spacing, has oropharyngeal dysphagia on precautions, with signs of aspiration despite these precautions, gets exhausted very easily and has expressed that she just wants to lay in bed. Declined MBBS and has expressed not wanting to proceed with PT/OT. Is no interested in nursing facility but rather adamant on going home. Lives alone and was fairly independent up until a month ago or so (per daughter and daughter in  law). Son is only one who lives close. Is DNR and is hospice appropriate. Palliative care on board. Pending official meeting with daughter and son today to clarify goals of care.    Interval History: slept intermittently last night. Had a dose of ramelteon last night. Is evidently delirious and at times agitated. Declining to sit in chair or to be moved at all. Is more alert than yesterday however. Appears short of breath. Also coughing after meals. Also with visual hallucination. Gurgling. Daughter in law at bedside.     Review of Systems   HENT: Negative.    Eyes: Negative.    Respiratory: Negative.    Cardiovascular: Negative.    Gastrointestinal: Negative.    Endocrine: Negative.    Genitourinary: Negative.    Neurological: Positive for weakness.   Hematological: Negative.    Psychiatric/Behavioral: Positive for agitation, behavioral problems, confusion, hallucinations and sleep disturbance.     Objective:     Vital Signs (Most Recent):  Temp: 98.3 °F (36.8 °C) (07/28/17 0400)  Pulse: 91 (07/28/17 0657)  Resp: 18 (07/28/17 0400)  BP: (!) 115/54 (07/28/17 0400)  SpO2: 98 % (07/28/17 0400) Vital Signs (24h Range):  Temp:  [97.6 °F (36.4 °C)-98.7 °F (37.1 °C)] 98.3 °F (36.8 °C)  Pulse:  [] 91  Resp:  [18-20] 18  SpO2:  [95 %-98 %] 98 %  BP: (100-148)/(54-94) 115/54     Weight: 66 kg (145 lb 8.1 oz)  Body mass index is 24.21 kg/m².    Intake/Output Summary (Last 24 hours) at 07/28/17 0850  Last data filed at 07/28/17 0600   Gross per 24 hour   Intake              600 ml   Output                0 ml   Net              600 ml      Physical Exam   Constitutional: She appears well-developed.   Thin and frail.   HENT:   Head: Normocephalic.   +temporal wasting, hard of hearing.   Eyes: EOM are normal. Pupils are equal, round, and reactive to light.   Neck: Normal range of motion. Neck supple.   Cardiovascular: Normal rate and regular rhythm.    Pulmonary/Chest: Effort normal. She has rales.   Using accessory muscle  when speaking   Abdominal: Soft. She exhibits no distension.   Musculoskeletal: Normal range of motion. She exhibits no edema.   Neurological: She is alert. No cranial nerve deficit. She exhibits normal muscle tone.   Disoriented to time, but oriented to place and person. Visual hallucinations.    Skin: Skin is warm and dry.   Psychiatric: Her affect is labile. Her speech is not rapid and/or pressured. She is agitated and actively hallucinating. She exhibits abnormal recent memory and abnormal remote memory.   Can state her name and address, also that she is in the hospital, but then hallucinates   Nursing note and vitals reviewed.      Significant Labs: All pertinent labs within the past 24 hours have been reviewed.    Significant Imaging: I have reviewed all pertinent imaging results/findings within the past 24 hours.  I have reviewed and interpreted all pertinent imaging results/findings within the past 24 hours.    Assessment/Plan:      * PUD (peptic ulcer disease)    Patient had EGD on 7/22 and 7/23. 7/22- unable to visualize secondary to blood. Transfused blood. Second EGD on 7/23- esophageal and gastric ulcers with cautery and clips placed to gastric ulcer. Received further blood on 7/23- total of 4 units since admission. Now resolved. Off all anticoagulation. Change CBC to every other day. Changed PPI to BID. Continue via IV as patient likely to decline PO dose.           Metabolic encephalopathy    Unclear etiology at first given Na level was normal, but repeat level markedly different with hyponatremia of 117 and repeat confirmed to be true value. Workup with head CT and MRI negative, non-focal exam. Recent admit for urinary retention made infectious process a consideration but all cultures are negative. Is better, but I believe she may still be experience side effect from sedation given for EGD. Patient was not sleeping well at nights but sleeping all day.  It appears she has been encephalopathic since  admission, however it was purely metabolic at first, now this is delirium of other cause. Attempting to have patient out of bed today to have her more alert, however she is adamantly declining any activity at all. Will continue to encourage to at least be un in chair for meals. Kerry did not work as she only slept intermittently last night. Is alert today but still very delirious. Psych recommended not to use medication for delirium as these may cause lethargy and hyponatremia. Unable to obtain sample for UA . CXR with pulm edema. Is getting lasix and fluids stopped as she is evidently symptomatic. Is afebrile. Frequent reorientation. Blinds open during the day time, have family around at all times          Oropharyngeal dysphagia    Continued pureed diet with nectar thickened liquids. Only eating ~25% of meals. Cough with meals despite restrictions. Speech attempted MBBS but unsuccessful as patient declined study. Will continue current diet but family is aware that there are risks for aspiration. Patient wants ice cream. I will provide but with one-to-one assistance.           Chronic diastolic congestive heart failure    With pulmonary edema likely due to hypoalbuminemia and fluids. This may be a sign of slight decompensation. Stop fluids and check BG frequently as she has poor PO intake. Another dose of lasix now for pulmonary edema        Visual hallucination    As above          Pulmonary edema    As above          Paroxysmal atrial fibrillation    Off anticoagulation. Increased metoprolol. Still slightly tachy but will treat SOB as possible culprit. Management of pulm edema as above          Physical deconditioning    consulted PT/OT. Recommending SNF. PPD placed.  aware          DNR (do not resuscitate)    Reviewed with patient and family, she is DNR. Palliative care consulted for counseling and LaPOST filled out. Palliative care following.           Urinary retention    Pt was here last  week for urinary retention and started on flomax. Bladder scan and straight cath if needed. Has incontinence. Renal funciton stable          VTE Risk Mitigation         Ordered     Medium Risk of VTE  Once      07/21/17 0149     Reason for No Pharmacological VTE Prophylaxis  Once      07/21/17 0149        Will meet with daughter and son today for goals of care. Palliative care will be present    Marisela Mabry MD  Department of Hospital Medicine   [facility]

## 2017-07-29 LAB
ALBUMIN SERPL BCP-MCNC: 2 G/DL
ALP SERPL-CCNC: 59 U/L
ALT SERPL W/O P-5'-P-CCNC: 24 U/L
ANION GAP SERPL CALC-SCNC: 4 MMOL/L
AST SERPL-CCNC: 39 U/L
BILIRUB SERPL-MCNC: 2 MG/DL
BUN SERPL-MCNC: 21 MG/DL
CALCIUM SERPL-MCNC: 7.8 MG/DL
CHLORIDE SERPL-SCNC: 104 MMOL/L
CO2 SERPL-SCNC: 28 MMOL/L
CREAT SERPL-MCNC: 0.7 MG/DL
EST. GFR  (AFRICAN AMERICAN): >60 ML/MIN/1.73 M^2
EST. GFR  (NON AFRICAN AMERICAN): >60 ML/MIN/1.73 M^2
GLUCOSE SERPL-MCNC: 90 MG/DL
MAGNESIUM SERPL-MCNC: 1.3 MG/DL
PHOSPHATE SERPL-MCNC: 2.8 MG/DL
POTASSIUM SERPL-SCNC: 4.5 MMOL/L
PROT SERPL-MCNC: 5.2 G/DL
SODIUM SERPL-SCNC: 136 MMOL/L

## 2017-07-29 PROCEDURE — 25000003 PHARM REV CODE 250: Performed by: INTERNAL MEDICINE

## 2017-07-29 PROCEDURE — 97110 THERAPEUTIC EXERCISES: CPT

## 2017-07-29 PROCEDURE — 80053 COMPREHEN METABOLIC PANEL: CPT

## 2017-07-29 PROCEDURE — 94760 N-INVAS EAR/PLS OXIMETRY 1: CPT

## 2017-07-29 PROCEDURE — 83735 ASSAY OF MAGNESIUM: CPT

## 2017-07-29 PROCEDURE — 84100 ASSAY OF PHOSPHORUS: CPT

## 2017-07-29 PROCEDURE — 63600175 PHARM REV CODE 636 W HCPCS: Performed by: INTERNAL MEDICINE

## 2017-07-29 PROCEDURE — 12000002 HC ACUTE/MED SURGE SEMI-PRIVATE ROOM

## 2017-07-29 PROCEDURE — C9113 INJ PANTOPRAZOLE SODIUM, VIA: HCPCS | Performed by: INTERNAL MEDICINE

## 2017-07-29 PROCEDURE — 36415 COLL VENOUS BLD VENIPUNCTURE: CPT

## 2017-07-29 RX ORDER — FUROSEMIDE 10 MG/ML
20 INJECTION INTRAMUSCULAR; INTRAVENOUS ONCE
Status: COMPLETED | OUTPATIENT
Start: 2017-07-29 | End: 2017-07-29

## 2017-07-29 RX ADMIN — PANTOPRAZOLE SODIUM 40 MG: 40 INJECTION, POWDER, FOR SOLUTION INTRAVENOUS at 09:07

## 2017-07-29 RX ADMIN — METOPROLOL TARTRATE 50 MG: 50 TABLET ORAL at 08:07

## 2017-07-29 RX ADMIN — AMOXICILLIN AND CLAVULANATE POTASSIUM 1 TABLET: 875; 125 TABLET, FILM COATED ORAL at 08:07

## 2017-07-29 RX ADMIN — MICONAZOLE NITRATE: 20 CREAM TOPICAL at 08:07

## 2017-07-29 RX ADMIN — METOPROLOL TARTRATE 50 MG: 50 TABLET ORAL at 09:07

## 2017-07-29 RX ADMIN — AMOXICILLIN AND CLAVULANATE POTASSIUM 1 TABLET: 875; 125 TABLET, FILM COATED ORAL at 09:07

## 2017-07-29 RX ADMIN — FUROSEMIDE 20 MG: 10 INJECTION, SOLUTION INTRAMUSCULAR; INTRAVENOUS at 02:07

## 2017-07-29 RX ADMIN — MICONAZOLE NITRATE: 20 CREAM TOPICAL at 02:07

## 2017-07-29 RX ADMIN — PANTOPRAZOLE SODIUM 40 MG: 40 INJECTION, POWDER, FOR SOLUTION INTRAVENOUS at 08:07

## 2017-07-29 NOTE — PLAN OF CARE
Problem: Fall Risk (Adult)  Goal: Absence of Falls  Patient will demonstrate the desired outcomes by discharge/transition of care.   Outcome: Ongoing (interventions implemented as appropriate)   07/28/17 1800   Fall Risk (Adult)   Absence of Falls making progress toward outcome   Hourly rounds, room across from station, bed alarm on, family at bedside, free from falls.     Problem: Patient Care Overview  Goal: Plan of Care Review  Outcome: Ongoing (interventions implemented as appropriate)   07/28/17 1800   Coping/Psychosocial   Plan Of Care Reviewed With patient   Pt is awake alert and argumentative at times early in the shift, however cooperative with care, turning for bed pan, sat in chair with therapy. Max assist with 3 nurses to get back in bed.  Family at bedside all day. No distress noted. Continue with plan of care as ordered.

## 2017-07-29 NOTE — ASSESSMENT & PLAN NOTE
With pulmonary edema likely due to hypoalbuminemia and fluids. This may be a sign of slight decompensation. Stopped fluids. Check BG frequently as she has poor PO intake. Another dose of IV lasix now for pulmonary edema

## 2017-07-29 NOTE — PLAN OF CARE
Problem: Patient Care Overview  Goal: Plan of Care Review  Outcome: Ongoing (interventions implemented as appropriate)  Patient is alert and oriented to self and situation.  Patient remains free from falls, is afebrile, and states no pain.  Patient voids per bedpan, and is occasionally incontinent of stool and urine.  Patient is tolerating pureed diet with nectar thick liquids well.  Patient takes pills crushed with no issue.  Patient skin is being protected with Q2 hours, sundance boots, and pillow supporting bony prominences.  Micanozole cream being used to treat macerated skin in perineum.  Will continue to monitor patient for safety, diet tolerance, skin integrity and s/s;s of infection.

## 2017-07-29 NOTE — PLAN OF CARE
Problem: Fall Risk (Adult)  Goal: Absence of Falls  Patient will demonstrate the desired outcomes by discharge/transition of care.   Outcome: Ongoing (interventions implemented as appropriate)   07/29/17 0530   Fall Risk (Adult)   Absence of Falls making progress toward outcome   Located close to nursing station, bed alarm on.    Problem: Patient Care Overview  Goal: Plan of Care Review  Outcome: Ongoing (interventions implemented as appropriate)   07/29/17 0530   Coping/Psychosocial   Plan Of Care Reviewed With patient   Rested quietly at intervals, changed picc line dsg this am, no report of pain or discomfort. Repositioned q 2 hours, incontinent of urine, no stool.    Problem: Infection, Risk/Actual (Adult)  Goal: Infection Prevention/Resolution  Patient will demonstrate the desired outcomes by discharge/transition of care.   Outcome: Ongoing (interventions implemented as appropriate)   07/29/17 0530   Infection, Risk/Actual (Adult)   Infection Prevention/Resolution making progress toward outcome   Receiving oral abx as ordered.    Problem: Confusion, Acute (Adult)  Goal: Identify Related Risk Factors and Signs and Symptoms  Related risk factors and signs and symptoms are identified upon initiation of Human Response Clinical Practice Guideline (CPG)   Outcome: Ongoing (interventions implemented as appropriate)   07/29/17 0530   Confusion, Acute   Related Risk Factors (Acute Confusion) cognitive impairment   Oriented to name and birth date, not to time and place.

## 2017-07-29 NOTE — SUBJECTIVE & OBJECTIVE
Interval History: Delirium with visual hallucination persist. Still using accessory muscle when speaking. Is enjoying Ice cream and pudding, but not eating much else.     Review of Systems   HENT: Negative.    Eyes: Negative.    Respiratory: Negative.    Cardiovascular: Negative.    Gastrointestinal: Negative.    Endocrine: Negative.    Genitourinary: Negative.    Neurological: Positive for weakness.   Hematological: Negative.    Psychiatric/Behavioral: Positive for agitation, behavioral problems, confusion, hallucinations and sleep disturbance.     Objective:     Vital Signs (Most Recent):  Temp: 98.3 °F (36.8 °C) (07/29/17 1234)  Pulse: 103 (07/29/17 1234)  Resp: 19 (07/29/17 1234)  BP: 125/60 (07/29/17 1234)  SpO2: 98 % (07/29/17 1234) Vital Signs (24h Range):  Temp:  [97.4 °F (36.3 °C)-98.5 °F (36.9 °C)] 98.3 °F (36.8 °C)  Pulse:  [] 103  Resp:  [16-20] 19  SpO2:  [91 %-98 %] 98 %  BP: (114-143)/(57-69) 125/60     Weight: 66 kg (145 lb 8.1 oz)  Body mass index is 24.21 kg/m².    Intake/Output Summary (Last 24 hours) at 07/29/17 1607  Last data filed at 07/29/17 1200   Gross per 24 hour   Intake              660 ml   Output                0 ml   Net              660 ml      Physical Exam   Constitutional: She appears well-developed.   Thin and frail.   HENT:   Head: Normocephalic.   +temporal wasting, hard of hearing.   Eyes: EOM are normal. Pupils are equal, round, and reactive to light.   Neck: Normal range of motion. Neck supple.   Cardiovascular: Normal rate and regular rhythm.    Pulmonary/Chest: Effort normal. She has rales.   Using accessory muscle when speaking   Abdominal: Soft. She exhibits no distension.   Musculoskeletal: Normal range of motion. She exhibits no edema.   Neurological: She is alert. No cranial nerve deficit. She exhibits normal muscle tone.   Disoriented to time, but oriented to place and person. Visual hallucinations.    Skin: Skin is warm and dry.   Psychiatric: Her affect is  labile. Her speech is not rapid and/or pressured. She is agitated and actively hallucinating. She exhibits abnormal recent memory and abnormal remote memory.   Can state her name and address, also that she is in the hospital, but then hallucinates   Nursing note and vitals reviewed.      Significant Labs: All pertinent labs within the past 24 hours have been reviewed.    Significant Imaging: I have reviewed all pertinent imaging results/findings within the past 24 hours.  I have reviewed and interpreted all pertinent imaging results/findings within the past 24 hours.

## 2017-07-29 NOTE — PT/OT/SLP PROGRESS
"Physical Therapy  Treatment    Rachael Almaguer   MRN: [id number]   Admitting Diagnosis: PUD (peptic ulcer disease)    PT Received On: 07/29/17  PT Start Time: 0945     PT Stop Time: 1000    PT Total Time (min): 15 min       Billable Minutes:  Therapeutic Exercise 15    Treatment Type: Treatment  PT/PTA: PTA     PTA Visit Number: 1       General Precautions: Standard, fall, aspiration, pureed diet, hearing impaired, honey thick  Orthopedic Precautions: N/A   Braces: N/A         Subjective:  Communicated with nurse Alice prior to session.  Pt agreeable to therapy. Pt declined OOB mobility, pt stated " I need to take a nap " . Pt agreeable to supine ex's.   Per nursing, pt had a rough morning.   Pain/Comfort  Pain Rating 1: 0/10  Pain Rating Post-Intervention 1: 0/10    Objective:   Patient found with: telemetry, oxygen,PICC line    Functional Mobility:  Bed Mobility:   Rolling/Turning to Left: Maximum assistance to reposition pt in bed.     Therapeutic Activities and Exercises:  Performed AAROM/PROM BLE in all available planes. Pt required constant V/T cues to stay awake during ex's.      AM-PAC 6 CLICK MOBILITY  How much help from another person does this patient currently need?   1 = Unable, Total/Dependent Assistance  2 = A lot, Maximum/Moderate Assistance  3 = A little, Minimum/Contact Guard/Supervision  4 = None, Modified [place]/Independent    Turning over in bed (including adjusting bedclothes, sheets and blankets)?: 2  Sitting down on and standing up from a chair with arms (e.g., wheelchair, bedside commode, etc.): 2  Moving from lying on back to sitting on the side of the bed?: 2  Moving to and from a bed to a chair (including a wheelchair)?: 2  Need to walk in hospital room?: 2  Climbing 3-5 steps with a railing?: 2  Total Score: 12    AM-PAC Raw Score CMS G-Code Modifier Level of Impairment Assistance   6 % Total / Unable   7 - 9 CM 80 - 100% Maximal Assist   10 - 14 CL 60 - 80% Moderate Assist "   15 - 19 CK 40 - 60% Moderate Assist   20 - 22 CJ 20 - 40% Minimal Assist   23 CI 1-20% SBA / CGA   24 CH 0% Independent/ Mod I     Patient left left sidelying BLE heels placed on pressure relief boots with all lines intact, call button in reach, nurse Alice notified .    Assessment:  Rachael Almaguer is a 89 y.o. female with a medical diagnosis of PUD (peptic ulcer disease) .    Rehab identified problem list/impairments: Rehab identified problem list/impairments: weakness, decreased lower extremity function, decreased upper extremity function, impaired cognition, decreased coordination, impaired self care skills, decreased ROM, impaired cardiopulmonary response to activity    Rehab potential is fair.    Activity tolerance: Fair    Discharge recommendations: Discharge Facility/Level Of Care Needs: nursing facility, skilled     Barriers to discharge:   Barriers to Discharge: Decreased caregiver support (patient lives alone )    Equipment recommendations:   Equipment Needed After Discharge: wheelchair     GOALS:    Physical Therapy Goals        Problem: Physical Therapy Goal    Goal Priority Disciplines Outcome Goal Variances Interventions   Physical Therapy Goal     PT/OT, PT Ongoing (interventions implemented as appropriate)     Description:  Goals to be met by: 17    Patient will increase functional independence with mobility by performin. Supine to sit with Moderate Assistance  2. Sit to stand transfer with Moderate Assistance  3. Gait x50ft with RW and moderate assistance  4. Lower extremity exercise program x30 reps per handout, with assistance as needed                       PLAN:    Patient to be seen 6 x/week (saturday )  to address the above listed problems via gait training, therapeutic activities, therapeutic exercises  Plan of Care expires: 17  Plan of Care reviewed with: patient, family         Aysha Krause, CHRISTINE  2017

## 2017-07-29 NOTE — PROGRESS NOTES
"[facility]  Progress Note    Patient Name: Rachael Almaguer  MRN: [id number]  Patient Class: IP- Inpatient   Admission Date: 7/20/2017  Length of Stay: 9 days  Attending Physician: Marisela Hollis MD  Primary Care Provider: Boris Carmichael MD        Subjective:     Principal Problem:PUD (peptic ulcer disease)    HPI:  Rachael Almaguer is a 89 y.o. with medical history HTN, CAD, and depression. She presented for evaluation of 5 day history of hallucinations since discharge from this hospital on Saturday. Patient was admitted for urinary retension. History taken from patients chart and patient.    Per patient, who surprisingly gives good history except for the visual hallucinations, she had been seeing people who are not there. She tells me that her 10 year old granddaughter is in the chair in the room during history taking. However, she is able to tell me that on Monday she saw her PCP who told her "you don't look good". She also saw her cardiologist (Dr. Whiting) today who told her to come to the hospital. Additionally, patient reports BLLE swelling.    Patient is found to have significant drop in H/H since 2 weeks ago was 14.1/41.0 now 10.5/30.9; dehydration with BUN/CR/GFR 57/1.8/24 compared to Jan her renal functions were normal.            Hospital Course:  Ms. Almaguer was admitted with AMS of uncertain etiology in the beginning, but quickly became clear after repeat BMP showed Na was 117 causing metabolic encephalopathy. Pt had negative head CT and further workup with MRI was unremarkable. Neurology following. Pt with hypovolemia causing hyponatremia and all diuretics stopped and steady correction with NS in progress with close monitoring of electrolytes. Patient was transferred to the ICU on 7/22 for acute GI bleed.  GI was consulted and took the patient to EGD the same day.  Blood was found in the entire stomach.  The patient received multiple units of blood. She continued " to bleed and the patient was taken back to EGD on 7/23. This showed non-bleeding esophageal ulcer as well as a non-bleeding gastric ulcer with cautery and clips placed.  GI recommended continued ICU monitoring.  Patient is DNR. Patient received blood again on 7/23. She received a total of 4 units of blood. The patient was very agitated on 7/23 and Zyprexa was started. The patient's H/H stabilized since 7/23 and the patient was sent to the floor on 7/24. No further evidence of GI bleed since. Speech, PT/OT were consulted on 7/24. Has declined significantly and at times is unwilling to participate as she feels exhausted. Speech recommended pureed with nectar thickened liquids, but unfortunately patient continues to show signs of aspiration as she tends to cough after meals. An MBBS was attempted on 7/27 but patient adamantly refused. Metoprolol increased 50 mg BID for better control of AFib. Anticoagulation on hold indefinitely. Patient and family aware that AFib increases risk for stroke when not on anticoagulation. Verbalized understanding. Also third-spacing and evidently with pulmonary edema. Trial of IV lasix but will need to continue very gently hydration with D5W as PO intake is extremely limited and is at risk for hypoglycemia. Delirium and visual hallucinations continue to be an issue. Barely sleeps at night despite efforts. Psych consulted for recs on this matter. I am concerned that patient will not achieve meaningful recovery in order to transfer to a SNF. Is evidently malnourished, is third spacing, has oropharyngeal dysphagia on precautions, with signs of aspiration despite these precautions, gets exhausted very easily and has expressed that she just wants to lay in bed. Declined MBBS and has expressed not wanting to proceed with PT/OT. Is no interested in nursing facility but rather adamant on going home. Lives alone and was fairly independent up until a month ago or so (per daughter and daughter in  law). Son is only one who lives close. Is DNR and is hospice appropriate. Palliative care on board. Meeting with patient's three sons and daughter held on 7/28 along with palliative care and social work. Long discussion about patient's current clinical status and recommendation for hospice services. Overall, they all expressed agreement with hospice as best next step but only one son lives in [place] and is unable to provide 24-hour care for patient at home. Other two sons and daughter expressed difficulty meeting such demand as well. They also expressed not able to afford a private sitter. NH with hospice is not an option as it goes against patient's wishes.     Interval History: Delirium with visual hallucination persist. Still using accessory muscle when speaking. Is enjoying Ice cream and pudding, but not eating much else.     Review of Systems   HENT: Negative.    Eyes: Negative.    Respiratory: Negative.    Cardiovascular: Negative.    Gastrointestinal: Negative.    Endocrine: Negative.    Genitourinary: Negative.    Neurological: Positive for weakness.   Hematological: Negative.    Psychiatric/Behavioral: Positive for agitation, behavioral problems, confusion, hallucinations and sleep disturbance.     Objective:     Vital Signs (Most Recent):  Temp: 98.3 °F (36.8 °C) (07/29/17 1234)  Pulse: 103 (07/29/17 1234)  Resp: 19 (07/29/17 1234)  BP: 125/60 (07/29/17 1234)  SpO2: 98 % (07/29/17 1234) Vital Signs (24h Range):  Temp:  [97.4 °F (36.3 °C)-98.5 °F (36.9 °C)] 98.3 °F (36.8 °C)  Pulse:  [] 103  Resp:  [16-20] 19  SpO2:  [91 %-98 %] 98 %  BP: (114-143)/(57-69) 125/60     Weight: 66 kg (145 lb 8.1 oz)  Body mass index is 24.21 kg/m².    Intake/Output Summary (Last 24 hours) at 07/29/17 1607  Last data filed at 07/29/17 1200   Gross per 24 hour   Intake              660 ml   Output                0 ml   Net              660 ml      Physical Exam   Constitutional: She appears well-developed.   Thin and frail.    HENT:   Head: Normocephalic.   +temporal wasting, hard of hearing.   Eyes: EOM are normal. Pupils are equal, round, and reactive to light.   Neck: Normal range of motion. Neck supple.   Cardiovascular: Normal rate and regular rhythm.    Pulmonary/Chest: Effort normal. She has rales.   Using accessory muscle when speaking   Abdominal: Soft. She exhibits no distension.   Musculoskeletal: Normal range of motion. She exhibits no edema.   Neurological: She is alert. No cranial nerve deficit. She exhibits normal muscle tone.   Disoriented to time, but oriented to place and person. Visual hallucinations.    Skin: Skin is warm and dry.   Psychiatric: Her affect is labile. Her speech is not rapid and/or pressured. She is agitated and actively hallucinating. She exhibits abnormal recent memory and abnormal remote memory.   Can state her name and address, also that she is in the hospital, but then hallucinates   Nursing note and vitals reviewed.      Significant Labs: All pertinent labs within the past 24 hours have been reviewed.    Significant Imaging: I have reviewed all pertinent imaging results/findings within the past 24 hours.  I have reviewed and interpreted all pertinent imaging results/findings within the past 24 hours.    Assessment/Plan:      * PUD (peptic ulcer disease)    Patient had EGD on 7/22 and 7/23. 7/22- unable to visualize secondary to blood. Transfused blood. Second EGD on 7/23- esophageal and gastric ulcers with cautery and clips placed to gastric ulcer. Received further blood on 7/23- total of 4 units since admission. Now resolved. Off all anticoagulation. Change CBC to every other day. Changed PPI to BID. Continue via IV as patient likely to decline PO dose.           Metabolic encephalopathy    Unclear etiology at first given Na level was normal, but repeat level markedly different with hyponatremia of 117 and repeat confirmed to be true value. Workup with head CT and MRI negative, non-focal exam.  Recent admit for urinary retention made infectious process a consideration but all cultures are negative. Is better, but I believe she may still be experience side effect from sedation given for EGD. Patient was not sleeping well at nights but sleeping all day.  It appears she has been encephalopathic since admission, however it was purely metabolic at first, now this is delirium of other cause. Attempt to have patient up in chair if she agrees. Empitic abx treatment with augmentin in case aspiration PNA and/or UTI (unable to obtain sample). Frequent reorientation. Blinds open during the day time, have family around at all times          Oropharyngeal dysphagia    Continued pureed diet with nectar thickened liquids. Only eating ~25% of meals. Mostly eating ice cream and pudding. Cough with meals despite restrictions. Speech attempted MBBS but unsuccessful as patient declined study. Will continue current diet but family is aware that there are risks for aspiration. Patient wants ice cream. Providing but with one-to-one assistance.           Chronic diastolic congestive heart failure    With pulmonary edema likely due to hypoalbuminemia and fluids. This may be a sign of slight decompensation. Stopped fluids. Check BG frequently as she has poor PO intake. Another dose of IV lasix now for pulmonary edema        Visual hallucination    As above          Pulmonary edema    As above          Paroxysmal atrial fibrillation    Off anticoagulation. Increased metoprolol. Still slightly tachy but will treat SOB as possible culprit. Management of pulm edema as above          Physical deconditioning    consulted PT/OT. Recommending SNF but this might not be ultimate goal. PPD placed.  aware          DNR (do not resuscitate)    Reviewed with patient and family, she is DNR. Palliative care consulted for counseling and [place]POST filled out. Palliative care following.           Urinary retention    Pt was here last week  for urinary retention and started on flomax. Bladder scan and straight cath if needed. Has incontinence. Renal funciton stable          VTE Risk Mitigation         Ordered     Medium Risk of VTE  Once      07/21/17 0149     Reason for No Pharmacological VTE Prophylaxis  Once      07/21/17 0149          Marisela Mabry MD  Department of Hospital Medicine   [facility]

## 2017-07-29 NOTE — PLAN OF CARE
Problem: Physical Therapy Goal  Goal: Physical Therapy Goal  Goals to be met by: 17    Patient will increase functional independence with mobility by performin. Supine to sit with Moderate Assistance  2. Sit to stand transfer with Moderate Assistance  3. Gait x50ft with RW and moderate assistance  4. Lower extremity exercise program x30 reps per handout, with assistance as needed      Outcome: Ongoing (interventions implemented as appropriate)  Pt will benefit from further skilled therapy in order to get back to PLOF.

## 2017-07-29 NOTE — ASSESSMENT & PLAN NOTE
Continued pureed diet with nectar thickened liquids. Only eating ~25% of meals. Mostly eating ice cream and pudding. Cough with meals despite restrictions. Speech attempted MBBS but unsuccessful as patient declined study. Will continue current diet but family is aware that there are risks for aspiration. Patient wants ice cream. Providing but with one-to-one assistance.

## 2017-07-29 NOTE — ASSESSMENT & PLAN NOTE
Unclear etiology at first given Na level was normal, but repeat level markedly different with hyponatremia of 117 and repeat confirmed to be true value. Workup with head CT and MRI negative, non-focal exam. Recent admit for urinary retention made infectious process a consideration but all cultures are negative. Is better, but I believe she may still be experience side effect from sedation given for EGD. Patient was not sleeping well at nights but sleeping all day.  It appears she has been encephalopathic since admission, however it was purely metabolic at first, now this is delirium of other cause. Attempt to have patient up in chair if she agrees. Empitic abx treatment with augmentin in case aspiration PNA and/or UTI (unable to obtain sample). Frequent reorientation. Blinds open during the day time, have family around at all times

## 2017-07-30 LAB
ALBUMIN SERPL BCP-MCNC: 2 G/DL
ALP SERPL-CCNC: 60 U/L
ALT SERPL W/O P-5'-P-CCNC: 23 U/L
ANION GAP SERPL CALC-SCNC: 6 MMOL/L
AST SERPL-CCNC: 36 U/L
BASOPHILS # BLD AUTO: 0.01 K/UL
BASOPHILS NFR BLD: 0.1 %
BILIRUB SERPL-MCNC: 2.3 MG/DL
BUN SERPL-MCNC: 24 MG/DL
CALCIUM SERPL-MCNC: 7.9 MG/DL
CHLORIDE SERPL-SCNC: 102 MMOL/L
CO2 SERPL-SCNC: 27 MMOL/L
CREAT SERPL-MCNC: 0.7 MG/DL
DIFFERENTIAL METHOD: ABNORMAL
EOSINOPHIL # BLD AUTO: 0.1 K/UL
EOSINOPHIL NFR BLD: 0.4 %
ERYTHROCYTE [DISTWIDTH] IN BLOOD BY AUTOMATED COUNT: 18.1 %
EST. GFR  (AFRICAN AMERICAN): >60 ML/MIN/1.73 M^2
EST. GFR  (NON AFRICAN AMERICAN): >60 ML/MIN/1.73 M^2
GLUCOSE SERPL-MCNC: 94 MG/DL
HCT VFR BLD AUTO: 34.6 %
HGB BLD-MCNC: 11.1 G/DL
LYMPHOCYTES # BLD AUTO: 1.4 K/UL
LYMPHOCYTES NFR BLD: 10.4 %
MAGNESIUM SERPL-MCNC: 1.4 MG/DL
MCH RBC QN AUTO: 32.3 PG
MCHC RBC AUTO-ENTMCNC: 32.1 G/DL
MCV RBC AUTO: 101 FL
MONOCYTES # BLD AUTO: 1.7 K/UL
MONOCYTES NFR BLD: 13.2 %
NEUTROPHILS # BLD AUTO: 10 K/UL
NEUTROPHILS NFR BLD: 75.9 %
PHOSPHATE SERPL-MCNC: 3.2 MG/DL
PLATELET # BLD AUTO: 196 K/UL
PMV BLD AUTO: 9.3 FL
POCT GLUCOSE: 112 MG/DL (ref 70–110)
POCT GLUCOSE: 120 MG/DL (ref 70–110)
POCT GLUCOSE: 140 MG/DL (ref 70–110)
POTASSIUM SERPL-SCNC: 4.7 MMOL/L
PROT SERPL-MCNC: 5.6 G/DL
RBC # BLD AUTO: 3.44 M/UL
SODIUM SERPL-SCNC: 135 MMOL/L
WBC # BLD AUTO: 13.15 K/UL

## 2017-07-30 PROCEDURE — 12000002 HC ACUTE/MED SURGE SEMI-PRIVATE ROOM

## 2017-07-30 PROCEDURE — 25000003 PHARM REV CODE 250: Performed by: INTERNAL MEDICINE

## 2017-07-30 PROCEDURE — 85025 COMPLETE CBC W/AUTO DIFF WBC: CPT

## 2017-07-30 PROCEDURE — 63600175 PHARM REV CODE 636 W HCPCS: Performed by: INTERNAL MEDICINE

## 2017-07-30 PROCEDURE — 83735 ASSAY OF MAGNESIUM: CPT

## 2017-07-30 PROCEDURE — 80053 COMPREHEN METABOLIC PANEL: CPT

## 2017-07-30 PROCEDURE — 84100 ASSAY OF PHOSPHORUS: CPT

## 2017-07-30 PROCEDURE — C9113 INJ PANTOPRAZOLE SODIUM, VIA: HCPCS | Performed by: INTERNAL MEDICINE

## 2017-07-30 RX ORDER — FUROSEMIDE 10 MG/ML
20 INJECTION INTRAMUSCULAR; INTRAVENOUS ONCE
Status: COMPLETED | OUTPATIENT
Start: 2017-07-30 | End: 2017-07-30

## 2017-07-30 RX ADMIN — METOPROLOL TARTRATE 50 MG: 50 TABLET ORAL at 08:07

## 2017-07-30 RX ADMIN — PANTOPRAZOLE SODIUM 40 MG: 40 INJECTION, POWDER, FOR SOLUTION INTRAVENOUS at 09:07

## 2017-07-30 RX ADMIN — MICONAZOLE NITRATE: 20 CREAM TOPICAL at 08:07

## 2017-07-30 RX ADMIN — FUROSEMIDE 20 MG: 10 INJECTION, SOLUTION INTRAMUSCULAR; INTRAVENOUS at 01:07

## 2017-07-30 RX ADMIN — METOPROLOL TARTRATE 50 MG: 50 TABLET ORAL at 09:07

## 2017-07-30 RX ADMIN — MICONAZOLE NITRATE: 20 CREAM TOPICAL at 09:07

## 2017-07-30 RX ADMIN — AMOXICILLIN AND CLAVULANATE POTASSIUM 1 TABLET: 875; 125 TABLET, FILM COATED ORAL at 09:07

## 2017-07-30 RX ADMIN — AMOXICILLIN AND CLAVULANATE POTASSIUM 1 TABLET: 875; 125 TABLET, FILM COATED ORAL at 08:07

## 2017-07-30 RX ADMIN — PANTOPRAZOLE SODIUM 40 MG: 40 INJECTION, POWDER, FOR SOLUTION INTRAVENOUS at 08:07

## 2017-07-30 NOTE — PROGRESS NOTES
"[facility]  Progress Note    Patient Name: Rachael Almaguer  MRN: [id number]  Patient Class: IP- Inpatient   Admission Date: 7/20/2017  Length of Stay: 10 days  Attending Physician: Marisela Hollis MD  Primary Care Provider: Boris Carmichael MD        Subjective:     Principal Problem:PUD (peptic ulcer disease)    HPI:  Rachael Almaguer is a 89 y.o. with medical history HTN, CAD, and depression. She presented for evaluation of 5 day history of hallucinations since discharge from this hospital on Saturday. Patient was admitted for urinary retension. History taken from patients chart and patient.    Per patient, who surprisingly gives good history except for the visual hallucinations, she had been seeing people who are not there. She tells me that her 10 year old granddaughter is in the chair in the room during history taking. However, she is able to tell me that on Monday she saw her PCP who told her "you don't look good". She also saw her cardiologist (Dr. Whiting) today who told her to come to the hospital. Additionally, patient reports BLLE swelling.    Patient is found to have significant drop in H/H since 2 weeks ago was 14.1/41.0 now 10.5/30.9; dehydration with BUN/CR/GFR 57/1.8/24 compared to Jan her renal functions were normal.            Hospital Course:  Ms. Almaguer was admitted with AMS of uncertain etiology in the beginning, but quickly became clear after repeat BMP showed Na was 117 causing metabolic encephalopathy. Pt had negative head CT and further workup with MRI was unremarkable. Neurology following. Pt with hypovolemia causing hyponatremia and all diuretics stopped and steady correction with NS in progress with close monitoring of electrolytes. Patient was transferred to the ICU on 7/22 for acute GI bleed.  GI was consulted and took the patient to EGD the same day.  Blood was found in the entire stomach.  The patient received multiple units of blood. She continued " to bleed and the patient was taken back to EGD on 7/23. This showed non-bleeding esophageal ulcer as well as a non-bleeding gastric ulcer with cautery and clips placed.  GI recommended continued ICU monitoring.  Patient is DNR. Patient received blood again on 7/23. She received a total of 4 units of blood. The patient was very agitated on 7/23 and Zyprexa was started. The patient's H/H stabilized since 7/23 and the patient was sent to the floor on 7/24. No further evidence of GI bleed since. Speech, PT/OT were consulted on 7/24. Has declined significantly and at times is unwilling to participate as she feels exhausted. Speech recommended pureed with nectar thickened liquids, but unfortunately patient continues to show signs of aspiration as she tends to cough after meals. An MBBS was attempted on 7/27 but patient adamantly refused. Metoprolol increased 50 mg BID for better control of AFib. Anticoagulation on hold indefinitely. Patient and family aware that AFib increases risk for stroke when not on anticoagulation. Verbalized understanding. Also third-spacing and evidently with pulmonary edema. Trial of IV lasix but will need to continue very gently hydration with D5W as PO intake is extremely limited and is at risk for hypoglycemia. Delirium and visual hallucinations continue to be an issue. Barely sleeps at night despite efforts. Psych consulted for recs on this matter. I am concerned that patient will not achieve meaningful recovery in order to transfer to a SNF. Is evidently malnourished, is third spacing, has oropharyngeal dysphagia on precautions, with signs of aspiration despite these precautions, gets exhausted very easily and has expressed that she just wants to lay in bed. Declined MBBS and has expressed not wanting to proceed with PT/OT. Is no interested in nursing facility but rather adamant on going home. Lives alone and was fairly independent up until a month ago or so (per daughter and daughter in  law). Son is only one who lives close. Is DNR and is hospice appropriate. Palliative care on board. Meeting with patient's three sons and daughter held on 7/28 along with palliative care and social work. Long discussion about patient's current clinical status and recommendation for hospice services. Overall, they all expressed agreement with hospice as best next step but only one son lives in [place] and is unable to provide 24-hour care for patient at home. Other two sons and daughter expressed difficulty meeting such demand as well. They also expressed not able to afford a private sitter. NH with hospice is not an option as it goes against patient's wishes.     Interval History: more alert. Visual hallucination is less frequent. Breathing is less labored. Ate a bit more this AM, but still falls within 25% of meals. Sons and daughter at bedside. She was able to identify them as her offspring, but only able to accurately identify one of them.      Review of Systems   HENT: Negative.    Eyes: Negative.    Respiratory: Negative.    Cardiovascular: Negative.    Gastrointestinal: Negative.    Endocrine: Negative.    Genitourinary: Negative.    Neurological: Positive for weakness.   Hematological: Negative.    Psychiatric/Behavioral: Positive for confusion and hallucinations. Negative for agitation, behavioral problems and sleep disturbance.     Objective:     Vital Signs (Most Recent):  Temp: 99.1 °F (37.3 °C) (07/30/17 1100)  Pulse: 102 (07/30/17 1100)  Resp: 18 (07/30/17 1100)  BP: 137/66 (07/30/17 1100)  SpO2: 100 % (07/30/17 1100) Vital Signs (24h Range):  Temp:  [97.3 °F (36.3 °C)-99.1 °F (37.3 °C)] 99.1 °F (37.3 °C)  Pulse:  [] 102  Resp:  [17-18] 18  SpO2:  [98 %-100 %] 100 %  BP: (100-137)/(58-79) 137/66     Weight: 66 kg (145 lb 8.1 oz)  Body mass index is 24.21 kg/m².    Intake/Output Summary (Last 24 hours) at 07/30/17 1242  Last data filed at 07/30/17 0600   Gross per 24 hour   Intake              420 ml    Output                0 ml   Net              420 ml      Physical Exam   Constitutional: She appears well-developed.   Thin and frail.   HENT:   Head: Normocephalic.   +temporal wasting, hard of hearing.   Eyes: EOM are normal. Pupils are equal, round, and reactive to light.   Neck: Normal range of motion. Neck supple.   Cardiovascular: Normal rate and regular rhythm.    Pulmonary/Chest: Effort normal. No respiratory distress. She has rales.   Abdominal: Soft. She exhibits no distension.   Musculoskeletal: Normal range of motion. She exhibits no edema.   Neurological: She is alert. No cranial nerve deficit. She exhibits normal muscle tone.   Disoriented to time, but oriented to place and person. Intermittent Visual hallucinations.    Skin: Skin is warm and dry.   Psychiatric: Her affect is not labile. Her speech is not rapid and/or pressured. She is not agitated and not actively hallucinating. She exhibits abnormal recent memory and abnormal remote memory.   Nursing note and vitals reviewed.      Significant Labs: All pertinent labs within the past 24 hours have been reviewed.    Significant Imaging: I have reviewed all pertinent imaging results/findings within the past 24 hours.  I have reviewed and interpreted all pertinent imaging results/findings within the past 24 hours.    Assessment/Plan:      * PUD (peptic ulcer disease)    Patient had EGD on 7/22 and 7/23. 7/22- unable to visualize secondary to blood. Transfused blood. Second EGD on 7/23- esophageal and gastric ulcers with cautery and clips placed to gastric ulcer. Received further blood on 7/23- total of 4 units since admission. Now resolved. Off all anticoagulation. Change CBC to every other day. Changed PPI to BID. Continue via IV as patient likely to decline PO dose.           Metabolic encephalopathy    Unclear etiology at first given Na level was normal, but repeat level markedly different with hyponatremia of 117 and repeat confirmed to be true  value. Workup with head CT and MRI negative, non-focal exam. Recent admit for urinary retention made infectious process a consideration but all cultures are negative. Is better, but I believe she may still be experience side effect from sedation given for EGD. Patient was not sleeping well at nights but sleeping all day.  It appears she has been encephalopathic since admission, however it was purely metabolic at first, now this is purely delirium with visual hallucinations. May be her new baseline. Attempt to have patient up in chair if she agrees. Empitic abx treatment with augmentin in case aspiration PNA and/or UTI (unable to obtain sample). Frequent reorientation. Blinds open during the day time, have family around at all times          Oropharyngeal dysphagia    Continued pureed diet with nectar thickened liquids. Only eating ~25% of meals. Mostly eating ice cream and pudding. Cough with meals despite restrictions. This is less frequent now. Speech attempted MBBS but unsuccessful as patient declined study. Will continue current diet but family is aware that there are risks for aspiration. Providing but with one-to-one assistance.           Chronic diastolic congestive heart failure    With pulmonary edema likely due to hypoalbuminemia and fluids. This may be a sign of slight decompensation. Stopped fluids. Check BG frequently as she has poor PO intake. Currently WNL. Another dose of IV lasix now for pulmonary edema.        Visual hallucination    As above          Pulmonary edema    As above          Paroxysmal atrial fibrillation    Off anticoagulation. Increased metoprolol. Still slightly tachy but will treat SOB as possible culprit. Management of pulm edema as above          Physical deconditioning    consulted PT/OT. Recommending SNF but this might not be ultimate goal. PPD placed.  aware          DNR (do not resuscitate)    Reviewed with patient and family, she is DNR. Palliative care  consulted for counseling and [place]POST filled out. Palliative care following.           Urinary retention    Pt was here last week for urinary retention and started on flomax. Bladder scan and straight cath if needed. Has incontinence. Renal funciton stable          VTE Risk Mitigation         Ordered     Medium Risk of VTE  Once      07/21/17 0149     Reason for No Pharmacological VTE Prophylaxis  Once      07/21/17 0149        Continue current management. Home hospice is still appropriate.     Marisela Mabry MD  Department of Hospital Medicine   [facility]

## 2017-07-30 NOTE — NURSING
Patient telemetry box #[id number] confirmed with Jelly MARTINEZ.  Telemetry monitor on, , A-Fib. New batteries placed in box.

## 2017-07-30 NOTE — PLAN OF CARE
Problem: Patient Care Overview  Goal: Plan of Care Review  Outcome: Ongoing (interventions implemented as appropriate)  Patient is alert and oriented to self and situation.  Patient remains free from falls, is afebrile, and states no pain.  Patient tolerating pureed diet and nectar thick liquids well, but has poor appetite.  Patient calls for bedpan to void, and is displaying improved strength in assisting with getting on bedpan.  Patient skin remains intact, and barrier cream and incontinence pads are being used for incontinence dermatitis.  Patient on telemetry monitoring, continues to have A-Fib rhythm.  Will continue to monitor patient for safety, s/s's of infection, diet tolerance and telemetry monitoring.

## 2017-07-30 NOTE — SUBJECTIVE & OBJECTIVE
Interval History: more alert. Visual hallucination is less frequent. Breathing is less labored. Ate a bit more this AM, but still falls within 25% of meals. Sons and daughter at bedside. She was able to identify them as her offspring, but only able to accurately identify one of them.      Review of Systems   HENT: Negative.    Eyes: Negative.    Respiratory: Negative.    Cardiovascular: Negative.    Gastrointestinal: Negative.    Endocrine: Negative.    Genitourinary: Negative.    Neurological: Positive for weakness.   Hematological: Negative.    Psychiatric/Behavioral: Positive for confusion and hallucinations. Negative for agitation, behavioral problems and sleep disturbance.     Objective:     Vital Signs (Most Recent):  Temp: 99.1 °F (37.3 °C) (07/30/17 1100)  Pulse: 102 (07/30/17 1100)  Resp: 18 (07/30/17 1100)  BP: 137/66 (07/30/17 1100)  SpO2: 100 % (07/30/17 1100) Vital Signs (24h Range):  Temp:  [97.3 °F (36.3 °C)-99.1 °F (37.3 °C)] 99.1 °F (37.3 °C)  Pulse:  [] 102  Resp:  [17-18] 18  SpO2:  [98 %-100 %] 100 %  BP: (100-137)/(58-79) 137/66     Weight: 66 kg (145 lb 8.1 oz)  Body mass index is 24.21 kg/m².    Intake/Output Summary (Last 24 hours) at 07/30/17 1242  Last data filed at 07/30/17 0600   Gross per 24 hour   Intake              420 ml   Output                0 ml   Net              420 ml      Physical Exam   Constitutional: She appears well-developed.   Thin and frail.   HENT:   Head: Normocephalic.   +temporal wasting, hard of hearing.   Eyes: EOM are normal. Pupils are equal, round, and reactive to light.   Neck: Normal range of motion. Neck supple.   Cardiovascular: Normal rate and regular rhythm.    Pulmonary/Chest: Effort normal. No respiratory distress. She has rales.   Abdominal: Soft. She exhibits no distension.   Musculoskeletal: Normal range of motion. She exhibits no edema.   Neurological: She is alert. No cranial nerve deficit. She exhibits normal muscle tone.   Disoriented to  time, but oriented to place and person. Intermittent Visual hallucinations.    Skin: Skin is warm and dry.   Psychiatric: Her affect is not labile. Her speech is not rapid and/or pressured. She is not agitated and not actively hallucinating. She exhibits abnormal recent memory and abnormal remote memory.   Nursing note and vitals reviewed.      Significant Labs: All pertinent labs within the past 24 hours have been reviewed.    Significant Imaging: I have reviewed all pertinent imaging results/findings within the past 24 hours.  I have reviewed and interpreted all pertinent imaging results/findings within the past 24 hours.

## 2017-07-30 NOTE — ASSESSMENT & PLAN NOTE
Unclear etiology at first given Na level was normal, but repeat level markedly different with hyponatremia of 117 and repeat confirmed to be true value. Workup with head CT and MRI negative, non-focal exam. Recent admit for urinary retention made infectious process a consideration but all cultures are negative. Is better, but I believe she may still be experience side effect from sedation given for EGD. Patient was not sleeping well at nights but sleeping all day.  It appears she has been encephalopathic since admission, however it was purely metabolic at first, now this is purely delirium with visual hallucinations. May be her new baseline. Attempt to have patient up in chair if she agrees. Empitic abx treatment with augmentin in case aspiration PNA and/or UTI (unable to obtain sample). Frequent reorientation. Blinds open during the day time, have family around at all times

## 2017-07-30 NOTE — ASSESSMENT & PLAN NOTE
Continued pureed diet with nectar thickened liquids. Only eating ~25% of meals. Mostly eating ice cream and pudding. Cough with meals despite restrictions. This is less frequent now. Speech attempted MBBS but unsuccessful as patient declined study. Will continue current diet but family is aware that there are risks for aspiration. Providing but with one-to-one assistance.

## 2017-07-30 NOTE — PLAN OF CARE
Problem: Fall Risk (Adult)  Goal: Absence of Falls  Patient will demonstrate the desired outcomes by discharge/transition of care.   Outcome: Ongoing (interventions implemented as appropriate)   07/30/17 0732   Fall Risk (Adult)   Absence of Falls making progress toward outcome   Located close to nursing station with bed alarm on.    Problem: Patient Care Overview  Goal: Plan of Care Review  Outcome: Ongoing (interventions implemented as appropriate)   07/30/17 0732   Coping/Psychosocial   Plan Of Care Reviewed With patient   Rested quietly for several short intervals, when awake calls out for water. Also talking to man at foot of bed, not there. Repositioned q 2 hours, incontinent of urine, no stool.    Problem: Infection, Risk/Actual (Adult)  Goal: Infection Prevention/Resolution  Patient will demonstrate the desired outcomes by discharge/transition of care.   Outcome: Ongoing (interventions implemented as appropriate)   07/30/17 0732   Infection, Risk/Actual (Adult)   Infection Prevention/Resolution making progress toward outcome   Receiving po abx as ordered.

## 2017-07-30 NOTE — ASSESSMENT & PLAN NOTE
With pulmonary edema likely due to hypoalbuminemia and fluids. This may be a sign of slight decompensation. Stopped fluids. Check BG frequently as she has poor PO intake. Currently WNL. Another dose of IV lasix now for pulmonary edema.

## 2017-07-31 PROBLEM — E43 SEVERE MALNUTRITION: Status: ACTIVE | Noted: 2017-07-31

## 2017-07-31 LAB
ALBUMIN SERPL BCP-MCNC: 2 G/DL
ALP SERPL-CCNC: 58 U/L
ALT SERPL W/O P-5'-P-CCNC: 24 U/L
ANION GAP SERPL CALC-SCNC: 7 MMOL/L
AST SERPL-CCNC: 36 U/L
BILIRUB SERPL-MCNC: 2.5 MG/DL
BUN SERPL-MCNC: 28 MG/DL
CALCIUM SERPL-MCNC: 8.1 MG/DL
CHLORIDE SERPL-SCNC: 102 MMOL/L
CO2 SERPL-SCNC: 27 MMOL/L
CREAT SERPL-MCNC: 0.8 MG/DL
EST. GFR  (AFRICAN AMERICAN): >60 ML/MIN/1.73 M^2
EST. GFR  (NON AFRICAN AMERICAN): >60 ML/MIN/1.73 M^2
GLUCOSE SERPL-MCNC: 104 MG/DL
MAGNESIUM SERPL-MCNC: 1.5 MG/DL
PHOSPHATE SERPL-MCNC: 3.4 MG/DL
POTASSIUM SERPL-SCNC: 4.7 MMOL/L
PROT SERPL-MCNC: 5.8 G/DL
SODIUM SERPL-SCNC: 136 MMOL/L

## 2017-07-31 PROCEDURE — 12000002 HC ACUTE/MED SURGE SEMI-PRIVATE ROOM

## 2017-07-31 PROCEDURE — 97535 SELF CARE MNGMENT TRAINING: CPT

## 2017-07-31 PROCEDURE — 27000221 HC OXYGEN, UP TO 24 HOURS

## 2017-07-31 PROCEDURE — 80053 COMPREHEN METABOLIC PANEL: CPT

## 2017-07-31 PROCEDURE — 63600175 PHARM REV CODE 636 W HCPCS: Performed by: INTERNAL MEDICINE

## 2017-07-31 PROCEDURE — 97530 THERAPEUTIC ACTIVITIES: CPT

## 2017-07-31 PROCEDURE — C9113 INJ PANTOPRAZOLE SODIUM, VIA: HCPCS | Performed by: INTERNAL MEDICINE

## 2017-07-31 PROCEDURE — 84100 ASSAY OF PHOSPHORUS: CPT

## 2017-07-31 PROCEDURE — 94761 N-INVAS EAR/PLS OXIMETRY MLT: CPT

## 2017-07-31 PROCEDURE — 83735 ASSAY OF MAGNESIUM: CPT

## 2017-07-31 PROCEDURE — 25000003 PHARM REV CODE 250: Performed by: INTERNAL MEDICINE

## 2017-07-31 RX ADMIN — PANTOPRAZOLE SODIUM 40 MG: 40 INJECTION, POWDER, FOR SOLUTION INTRAVENOUS at 08:07

## 2017-07-31 RX ADMIN — METOPROLOL TARTRATE 50 MG: 50 TABLET ORAL at 09:07

## 2017-07-31 RX ADMIN — MICONAZOLE NITRATE: 20 CREAM TOPICAL at 08:07

## 2017-07-31 RX ADMIN — AMOXICILLIN AND CLAVULANATE POTASSIUM 1 TABLET: 875; 125 TABLET, FILM COATED ORAL at 08:07

## 2017-07-31 RX ADMIN — METOPROLOL TARTRATE 50 MG: 50 TABLET ORAL at 08:07

## 2017-07-31 NOTE — PLAN OF CARE
Problem: Patient Care Overview  Goal: Plan of Care Review  Outcome: Ongoing (interventions implemented as appropriate)  Patient up to BSC today with maximum assistance.      Patient sleeps often,  And needs to be fed eating pureed food,  And thicken liquids avoiding straws.  Family at bedside throughout the day.

## 2017-07-31 NOTE — PROGRESS NOTES
"[facility]  Progress Note    Patient Name: Rachael Almaguer  MRN: [id number]  Patient Class: IP- Inpatient   Admission Date: 7/20/2017  Length of Stay: 11 days  Attending Physician: Marisela Hollis MD  Primary Care Provider: Boris Carmichael MD        Subjective:     Principal Problem:PUD (peptic ulcer disease)    HPI:  Rachael Almaguer is a 89 y.o. with medical history HTN, CAD, and depression. She presented for evaluation of 5 day history of hallucinations since discharge from this hospital on Saturday. Patient was admitted for urinary retension. History taken from patients chart and patient.    Per patient, who surprisingly gives good history except for the visual hallucinations, she had been seeing people who are not there. She tells me that her 10 year old granddaughter is in the chair in the room during history taking. However, she is able to tell me that on Monday she saw her PCP who told her "you don't look good". She also saw her cardiologist (Dr. Whiting) today who told her to come to the hospital. Additionally, patient reports BLLE swelling.    Patient is found to have significant drop in H/H since 2 weeks ago was 14.1/41.0 now 10.5/30.9; dehydration with BUN/CR/GFR 57/1.8/24 compared to Jan her renal functions were normal.            Hospital Course:  Ms. Almaguer was admitted with AMS of uncertain etiology in the beginning, but quickly became clear after repeat BMP showed Na was 117 causing metabolic encephalopathy. Pt had negative head CT and further workup with MRI was unremarkable. Neurology following. Pt with hypovolemia causing hyponatremia and all diuretics stopped and steady correction with NS in progress with close monitoring of electrolytes. Patient was transferred to the ICU on 7/22 for acute GI bleed.  GI was consulted and took the patient to EGD the same day.  Blood was found in the entire stomach.  The patient received multiple units of blood. She continued " to bleed and the patient was taken back to EGD on 7/23. This showed non-bleeding esophageal ulcer as well as a non-bleeding gastric ulcer with cautery and clips placed.  GI recommended continued ICU monitoring.  Patient is DNR. Patient received blood again on 7/23. She received a total of 4 units of blood. The patient was very agitated on 7/23 and Zyprexa was started. The patient's H/H stabilized since 7/23 and the patient was sent to the floor on 7/24. No further evidence of GI bleed since. Speech, PT/OT were consulted on 7/24. Has declined significantly and at times is unwilling to participate as she feels exhausted. Speech recommended pureed with nectar thickened liquids, but unfortunately patient continues to show signs of aspiration as she tends to cough after meals. An MBBS was attempted on 7/27 but patient adamantly refused. Metoprolol increased 50 mg BID for better control of AFib. Anticoagulation on hold indefinitely. Patient and family aware that AFib increases risk for stroke when not on anticoagulation. Verbalized understanding. Also third-spacing and evidently with pulmonary edema. Trial of IV lasix but will need to continue very gently hydration with D5W as PO intake is extremely limited and is at risk for hypoglycemia. Delirium and visual hallucinations continue to be an issue. Barely sleeps at night despite efforts. Psych consulted for recs on this matter. I am concerned that patient will not achieve meaningful recovery in order to transfer to a SNF. Is evidently malnourished, is third spacing, has oropharyngeal dysphagia on precautions, with signs of aspiration despite these precautions, gets exhausted very easily and has expressed that she just wants to lay in bed. Declined MBBS and has expressed not wanting to proceed with PT/OT. Is no interested in nursing facility but rather adamant on going home. Lives alone and was fairly independent up until a month ago or so (per daughter and daughter in  law). Son is only one who lives close. Is DNR and is hospice appropriate. Palliative care on board. Meeting with patient's three sons and daughter held on 7/28 along with palliative care and social work. Long discussion about patient's current clinical status and recommendation for hospice services. Overall, they all expressed agreement with hospice as best next step but only one son lives in [place] and is unable to provide 24-hour care for patient at home. Other two sons and daughter expressed difficulty meeting such demand as well. They also expressed not able to afford a private sitter. NH with hospice is not an option as it goes against patient's wishes.     Interval History: no change in clinical status    Review of Systems   HENT: Negative.    Eyes: Negative.    Respiratory: Negative.    Cardiovascular: Negative.    Gastrointestinal: Negative.    Endocrine: Negative.    Genitourinary: Negative.    Neurological: Positive for weakness.   Hematological: Negative.    Psychiatric/Behavioral: Positive for confusion and hallucinations. Negative for agitation, behavioral problems and sleep disturbance.     Objective:     Vital Signs (Most Recent):  Temp: 97.1 °F (36.2 °C) (07/31/17 1300)  Pulse: 86 (07/31/17 1300)  Resp: 18 (07/31/17 1300)  BP: 103/68 (07/31/17 1300)  SpO2: 98 % (07/31/17 1300) Vital Signs (24h Range):  Temp:  [97.1 °F (36.2 °C)-99.7 °F (37.6 °C)] 97.1 °F (36.2 °C)  Pulse:  [] 86  Resp:  [18-19] 18  SpO2:  [95 %-100 %] 98 %  BP: (103-137)/(51-70) 103/68     Weight: 70.3 kg (155 lb)  Body mass index is 25.79 kg/m².    Intake/Output Summary (Last 24 hours) at 07/31/17 1351  Last data filed at 07/31/17 0926   Gross per 24 hour   Intake              780 ml   Output                0 ml   Net              780 ml      Physical Exam   Constitutional: She appears well-developed.   Thin and frail.   HENT:   Head: Normocephalic.   +temporal wasting, hard of hearing.   Eyes: EOM are normal. Pupils are equal,  round, and reactive to light.   Neck: Normal range of motion. Neck supple.   Cardiovascular: Normal rate and regular rhythm.    Pulmonary/Chest: Effort normal. No respiratory distress. She has rales.   Abdominal: Soft. She exhibits no distension.   Musculoskeletal: Normal range of motion. She exhibits no edema.   Neurological: She is alert. No cranial nerve deficit. She exhibits normal muscle tone.   Disoriented to time, but oriented to place and person. Intermittent Visual hallucinations.    Skin: Skin is warm and dry.   Psychiatric: Her affect is not labile. Her speech is not rapid and/or pressured. She is not agitated and not actively hallucinating. She exhibits abnormal recent memory and abnormal remote memory.   Nursing note and vitals reviewed.      Significant Labs: All pertinent labs within the past 24 hours have been reviewed.    Significant Imaging: I have reviewed all pertinent imaging results/findings within the past 24 hours.  I have reviewed and interpreted all pertinent imaging results/findings within the past 24 hours.    Assessment/Plan:      * PUD (peptic ulcer disease)    Patient had EGD on 7/22 and 7/23. 7/22- unable to visualize secondary to blood. Transfused blood. Second EGD on 7/23- esophageal and gastric ulcers with cautery and clips placed to gastric ulcer. Received further blood on 7/23- total of 4 units since admission. Now resolved. Off all anticoagulation. Change CBC to every other day. Changed PPI to BID. Continue via IV as patient likely to decline PO dose.           Metabolic encephalopathy    Unclear etiology at first given Na level was normal, but repeat level markedly different with hyponatremia of 117 and repeat confirmed to be true value. Workup with head CT and MRI negative, non-focal exam. Recent admit for urinary retention made infectious process a consideration but all cultures are negative. Is better, but I believe she may still be experience side effect from sedation given  for EGD. Patient was not sleeping well at nights but sleeping all day.  It appears she has been encephalopathic since admission, however it was purely metabolic at first, now this is purely delirium with visual hallucinations. May be her new baseline. Attempt to have patient up in chair if she agrees. Empitic abx treatment with augmentin in case aspiration PNA and/or UTI (unable to obtain sample). Frequent reorientation. Blinds open during the day time, have family around at all times          Oropharyngeal dysphagia    Continued pureed diet with nectar thickened liquids. Only eating ~25% of meals. Mostly eating ice cream and pudding. Cough with meals despite restrictions. This is less frequent now. Speech attempted MBBS but unsuccessful as patient declined study. Will continue current diet but family is aware that there are risks for aspiration. Providing but with one-to-one assistance.           Chronic diastolic congestive heart failure    With pulmonary edema likely due to hypoalbuminemia and fluids. This may be a sign of slight decompensation. Stopped fluids. Check BG frequently as she has poor PO intake. Currently WNL. Not in respiratory distress today. Hold off on lasix.         Visual hallucination    As above          Pulmonary edema    As above          Paroxysmal atrial fibrillation    Off anticoagulation. Increased metoprolol. Still slightly tachy but will treat SOB as possible culprit. Management of pulm edema as above          Physical deconditioning    consulted PT/OT. Recommending SNF but this might not be ultimate goal. PPD placed.  aware          DNR (do not resuscitate)    Reviewed with patient and family, she is DNR. Palliative care consulted for counseling and [place] filled [place]. Palliative care following.           Urinary retention    Pt was here last week for urinary retention and started on flomax. Bladder scan and straight cath if needed. Has incontinence. Renal funciton  stable          VTE Risk Mitigation         Ordered     Medium Risk of VTE  Once      07/21/17 0149     Reason for No Pharmacological VTE Prophylaxis  Once      07/21/17 0149        Ultimate plan is hospice, likely home. Family is planning on providing 24-hours care but she may have to go out of state where most of them live. Son who lives in [place] is unable to provide 24 hour given that he is to care for his wife who currently suffers from dementia. Palliative care and  on board.     Marisela Mabry MD  Department of Hospital Medicine   [facility]

## 2017-07-31 NOTE — PROGRESS NOTES
PALLIATIVE CARE PROGRESS NOTE:    Brief bedside visit.  Patient asleep, appears calm, but frail.  Spoke to daughter-in-law who states family is leaning toward hospice care at home.  There are still other family demands and obligations and she feels they will only be able to arrange for family to be with patient at home for a limited time frame. The only family member who lives here is the son Se and his wife has significant dementia.  She has questions as to how they would move the patient out of state if needed (and continue with hospice care). Explained I will ask CLAUDIA to talk with them.    Left message for CLAUDIA Snowden.  -  ([phone])  Discussed with Isi.      Plan:  Family is considering hospice care.      Viridiana Cherry, LIN, RN, CCRN, CHPN   Palliative Care Nurse Coordinator   [facility]  [phone]

## 2017-07-31 NOTE — PROGRESS NOTES
CLAUDIA met with patient daughter-in-law Heather to discuss possible options for discharge. CLAUDIA did discuss inpatient hospice vs home/hospice for patient. CLAUDIA informed Heather she will have clinicals reviewed by a hospice provider to see what patient will be appropriate for, family will not be contacted at this point and time. CLAUDIA also brought to Heather attention family will need to consider how she will transport to [place] or [place] if family decides to move her out of state, CLAUDIA stated at this point the safest mode of transportation would be by ambulance and [employer] will not cover the cost therefore family would be responsible. CLAUDIA informed Heather she could find out the cost for the ambulance if family decides that's what they wanted to do.

## 2017-07-31 NOTE — PLAN OF CARE
Problem: Fall Risk (Adult)  Goal: Absence of Falls  Patient will demonstrate the desired outcomes by discharge/transition of care.   Outcome: Ongoing (interventions implemented as appropriate)   07/31/17 0139   Fall Risk (Adult)   Absence of Falls making progress toward outcome   Located c lose to nursing station with bed alarm on.     Problem: Patient Care Overview  Goal: Plan of Care Review  Outcome: Ongoing (interventions implemented as appropriate)   07/31/17 0139   Coping/Psychosocial   Plan Of Care Reviewed With patient   Resting quietly for short intervals, no report of pain or discomfort. Repositioned q 2 hours, incontinent of urine, no stool. Takes meds crushed in applesauce or pudding, drinking nectar thickened liquids. Talking out loud at times to people not in room.    Problem: Pressure Ulcer Risk (Estrada Scale) (Adult,Obstetrics,Pediatric)  Goal: Skin Integrity  Patient will demonstrate the desired outcomes by discharge/transition of care.   Outcome: Ongoing (interventions implemented as appropriate)   07/31/17 0139   Pressure Ulcer Risk (Estrada Scale) (Adult,Obstetrics,Pediatric)   Skin Integrity making progress toward outcome   Skin tears to lue and bruising to chest. Also has redness to buttock area.    Problem: Infection, Risk/Actual (Adult)  Goal: Infection Prevention/Resolution  Patient will demonstrate the desired outcomes by discharge/transition of care.   Outcome: Ongoing (interventions implemented as appropriate)   07/31/17 0139   Infection, Risk/Actual (Adult)   Infection Prevention/Resolution making progress toward outcome   Receiving po abx as ordered.

## 2017-07-31 NOTE — PLAN OF CARE
Problem: Occupational Therapy Goal  Goal: Occupational Therapy Goal  Goals to be met by: 8/8/2017     Patient will increase functional independence with ADLs by performing:    UE Dressing with Stand-by Assistance.  LE Dressing with Minimal Assistance.  Grooming while seated with Contact Guard Assistance.  Sitting at edge of bed x10 minutes with Stand-by Assistance.  Supine to sit with Contact Guard Assistance.  Upper extremity exercise program with assistance as needed.     Outcome: Ongoing (interventions implemented as appropriate)  Patent tolerated treatment well, fair participation, patient wanted to get OOB to eat lunch. SINAN Snow, MS

## 2017-07-31 NOTE — PROGRESS NOTES
Megan () with [facility] called SW with a response from referral, Megan reported patient is not appropriate for inpatient hospice; if patient starts to have an issue with pain control facility can then admit for inpatient however at this time patient would need home hospice. CLAUDIA contacted Marisela, Palliative Care Nurse to provide update.

## 2017-07-31 NOTE — PT/OT/SLP PROGRESS
"Occupational Therapy  Treatment    Rachael Almaguer   MRN: [id number]   Admitting Diagnosis: PUD (peptic ulcer disease)    OT Date of Treatment: 07/31/17   OT Start Time: 1210  OT Stop Time: 1234  OT Total Time (min): 24 min    Billable Minutes:  Self Care/Home Management 12 minutes and Therapeutic Activity 12 minutes    General Precautions: Standard, fall, aspiration, pureed diet, hearing impaired, honey thick  Orthopedic Precautions: N/A  Braces: N/A    Do you have any cultural, spiritual, Yarsani conflicts, given your current situation?: none    Subjective:  Communicated with nurse prior to session.  "I want to eat sitting up."  Pain/Comfort  Pain Rating 1: 0/10    Objective:  Patient found with: oxygen, telemetry, peripheral IV     Functional Mobility:  Bed Mobility:  Rolling/Turning to Left: Moderate assistance  Scooting/Bridging: Moderate Assistance  Supine to Sit: Moderate Assistance    Transfers:   Sit <> Stand Assistance: Moderate Assistance  Sit <> Stand Assistive Device: No Assistive Device  Bed <> Chair Technique: Stand Pivot  Bed <> Chair Transfer Assistance: Moderate Assistance  Bed <> Chair Assistive Device: No Assistive Device    Functional Ambulation: took 2-3 steps from EOB to bedside chair    Activities of Daily Living:  Feeding Level of Assistance: Maximum assistance  UE Dressing Level of Assistance: Minimum assistance  LE Dressing Level of Assistance: Total assistance    Balance:   Static Sit: FAIR: Maintains without assist, but unable to take any challenges   Dynamic Sit: FAIR: Cannot move trunk without losing balance  Static Stand: POOR+: Needs MINIMAL assist to maintain  Dynamic stand: POOR: N/A      AM-PAC 6 CLICK ADL   How much help from another person does this patient currently need?   1 = Unable, Total/Dependent Assistance  2 = A lot, Maximum/Moderate Assistance  3 = A little, Minimum/Contact Guard/Supervision  4 = None, Modified [place]/Independent    Putting on and taking off " "regular lower body clothing? : 1  Bathing (including washing, rinsing, drying)?: 1  Toileting, which includes using toilet, bedpan, or urinal? : 2  Putting on and taking off regular upper body clothing?: 2  Taking care of personal grooming such as brushing teeth?: 2  Eating meals?: 2  Total Score: 10     AM-PAC Raw Score CMS "G-Code Modifier Level of Impairment Assistance   6 % Total / Unable   7 - 8 CM 80 - 100% Maximal Assist   9-13 CL 60 - 80% Moderate Assist   14 - 19 CK 40 - 60% Moderate Assist   20 - 22 CJ 20 - 40% Minimal Assist   23 CI 1-20% SBA / CGA   24 CH 0% Independent/ Mod I       Patient left up in chair with all lines intact, call button in reach, nurse notified and family present    ASSESSMENT:  Rachael Almaguer is a 89 y.o. female with a medical diagnosis of PUD (peptic ulcer disease) and presents with  independence for self-care and functional transfers as well as decreased tolerance for activity.    Rehab identified problem list/impairments: Rehab identified problem list/impairments: weakness, impaired endurance, impaired self care skills, impaired functional mobilty, decreased upper extremity function, impaired cognition, decreased safety awareness, impaired cardiopulmonary response to activity    Rehab potential is fair.    Activity tolerance: Fair    Discharge recommendations: Discharge Facility/Level Of Care Needs:  (family considering hospice)     Barriers to discharge: Barriers to Discharge: Decreased caregiver support    Equipment recommendations: wheelchair     GOALS:    Occupational Therapy Goals        Problem: Occupational Therapy Goal    Goal Priority Disciplines Outcome Interventions   Occupational Therapy Goal     OT, PT/OT Ongoing (interventions implemented as appropriate)    Description:  Goals to be met by: 2017     Patient will increase functional independence with ADLs by performing:    UE Dressing with Stand-by Assistance.  LE Dressing with Minimal " Assistance.  Grooming while seated with Contact Guard Assistance.  Sitting at edge of bed x10 minutes with Stand-by Assistance.  Supine to sit with Contact Guard Assistance.  Upper extremity exercise program with assistance as needed.                      Plan:  Patient to be seen 3 x/week to address the above listed problems via self-care/home management, therapeutic activities, therapeutic exercises  Plan of Care expires: 08/01/17  Plan of Care reviewed with: patient, family    SINAN Snow, MS  07/31/2017

## 2017-07-31 NOTE — NURSING
BSC chair placed by patient's bed.  Patient to BSC with maximal assistance with patient slightly able to pivot to BSC.

## 2017-07-31 NOTE — SUBJECTIVE & OBJECTIVE
Interval History: no change in clinical status    Review of Systems   HENT: Negative.    Eyes: Negative.    Respiratory: Negative.    Cardiovascular: Negative.    Gastrointestinal: Negative.    Endocrine: Negative.    Genitourinary: Negative.    Neurological: Positive for weakness.   Hematological: Negative.    Psychiatric/Behavioral: Positive for confusion and hallucinations. Negative for agitation, behavioral problems and sleep disturbance.     Objective:     Vital Signs (Most Recent):  Temp: 97.1 °F (36.2 °C) (07/31/17 1300)  Pulse: 86 (07/31/17 1300)  Resp: 18 (07/31/17 1300)  BP: 103/68 (07/31/17 1300)  SpO2: 98 % (07/31/17 1300) Vital Signs (24h Range):  Temp:  [97.1 °F (36.2 °C)-99.7 °F (37.6 °C)] 97.1 °F (36.2 °C)  Pulse:  [] 86  Resp:  [18-19] 18  SpO2:  [95 %-100 %] 98 %  BP: (103-137)/(51-70) 103/68     Weight: 70.3 kg (155 lb)  Body mass index is 25.79 kg/m².    Intake/Output Summary (Last 24 hours) at 07/31/17 1351  Last data filed at 07/31/17 0926   Gross per 24 hour   Intake              780 ml   Output                0 ml   Net              780 ml      Physical Exam   Constitutional: She appears well-developed.   Thin and frail.   HENT:   Head: Normocephalic.   +temporal wasting, hard of hearing.   Eyes: EOM are normal. Pupils are equal, round, and reactive to light.   Neck: Normal range of motion. Neck supple.   Cardiovascular: Normal rate and regular rhythm.    Pulmonary/Chest: Effort normal. No respiratory distress. She has rales.   Abdominal: Soft. She exhibits no distension.   Musculoskeletal: Normal range of motion. She exhibits no edema.   Neurological: She is alert. No cranial nerve deficit. She exhibits normal muscle tone.   Disoriented to time, but oriented to place and person. Intermittent Visual hallucinations.    Skin: Skin is warm and dry.   Psychiatric: Her affect is not labile. Her speech is not rapid and/or pressured. She is not agitated and not actively hallucinating. She  exhibits abnormal recent memory and abnormal remote memory.   Nursing note and vitals reviewed.      Significant Labs: All pertinent labs within the past 24 hours have been reviewed.    Significant Imaging: I have reviewed all pertinent imaging results/findings within the past 24 hours.  I have reviewed and interpreted all pertinent imaging results/findings within the past 24 hours.

## 2017-07-31 NOTE — CONSULTS
CLAUDIA contacted [facility] @ [phone] to refer patient for services, CLAUDIA spoke to Megan () to request for [facility] to review patient clinicals to see if she would be appropriate for inpatient hospice or more appropriate for home with hospice. CLAUDIA added  to treatment team through [facility] [employer] to review clinicals. CLAUDIA requested for [facility] to not contact patient family yet because discharge options are being discussed. Megan stated she will call CLAUDIA back with an update after reviewing patient clinicals.

## 2017-07-31 NOTE — PLAN OF CARE
CLAUDIA met with patient daughter-in-law Heather to review IMM Notice, Heather verbalized understanding.         07/31/17 1148   Medicare Message   Important Message from Medicare regarding Discharge Appeal Rights Given to patient/caregiver;Explained to patient/caregiver;Signed/date by patient/caregiver

## 2017-07-31 NOTE — PROGRESS NOTES
[facility]  Adult Nutrition  Progress Note    SUMMARY     Recommendations  1) Appetite stimulant 2) Boost Plus chocolate TID (nectar thick consistency) 3) RD to monitor weight, po intake/tolerance  Goals: 1) Patient to meet >=50% of meals 2) Patient to utilize oral supplements  Nutrition Goal Status: new  Communication of RD Recs: reviewed with RN    Diagnosis  1. Visual hallucinations    2. Hallucinations    3. Hyperkalemia    4. Lactic acidosis    5. Acute renal failure, unspecified acute renal failure type    6. Altered mental status, unspecified altered mental status type    7. Metabolic encephalopathy    8. Arrhythmia      Past Medical History:   Diagnosis Date    Coronary artery disease     Hepatitis C     Hypertension      Reason for Assessment    Reason for Assessment: length of stay  General Information Comments: Patient presents with poor oral intake. Consuming 25% of meals. SLP assessed, recs pureed diet with nectar thick liquids. No straws.     Nutrition Discharge Planning: D/C Planning:  patient to discharge on pureed diet with oral supplements (nectar thickened).    Nutrition Prescription Ordered    Current Diet Order: Pureed  Nutrition Order Comments: nectar thickened liquids; aspiration precautions    Evaluation of Received Nutrients/Fluid Intake    Energy Calories Required: not meeting needs  Protein Required: not meeting needs  Fluid Required: other (see comments) (Net I/O  +780)     Tolerance: tolerating  % Intake of Estimated Energy Needs: 0 - 25 %  % Meal Intake: 25%     Nutrition Risk Screen     Nutrition Risk Screen: no indicators present    Nutrition/Diet History    Patient Reported Diet/Restrictions/Preferences:  (eduard)     Food Preferences: No cultural, Anabaptism or ethnic food preferences.        Factors Affecting Nutritional Intake: impaired cognitive status/motor control, decreased appetite, difficulty/impaired swallowing                Labs/Tests/Procedures/Meds      "  Pertinent Labs Reviewed: reviewed  BMP  Lab Results   Component Value Date     07/31/2017    K 4.7 07/31/2017     07/31/2017    CO2 27 07/31/2017    BUN 28 07/31/2017    CREATININE 0.8 07/31/2017    CALCIUM 8.1 (L) 07/31/2017    ANIONGAP 7 (L) 07/31/2017    ESTGFRAFRICA >60 07/31/2017    EGFRNONAA >60 07/31/2017     Lab Results   Component Value Date    CALCIUM 8.1 (L) 07/31/2017    PHOS 3.4 07/31/2017     Lab Results   Component Value Date    ALBUMIN 2.0 (L) 07/31/2017          Pertinent Medications Reviewed: reviewed  Scheduled Meds:   amoxicillin-clavulanate 875-125mg  1 tablet Oral Q12H    metoprolol tartrate  50 mg Oral BID    miconazole   Topical (Top) BID    pantoprazole  40 mg Intravenous BID     Continuous Infusions:   PRN Meds:.acetaminophen, ondansetron         Physical Findings    Overall Physical Appearance: loss of muscle mass, loss of subcutaneous fat, on oxygen therapy  Tubes:  (-)  Oral/Mouth Cavity: dental applicance present (specify) (top and bottom)  Skin: non-healing wound(s)    Anthropometrics    Temp: 98.2 °F (36.8 °C)     Height: 5' 5" (165.1 cm)  Weight Method: Bed Scale  Weight: 70.3 kg (154 lb 15.7 oz)     Ideal Body Weight (IBW), Female: 125 lb     % Ideal Body Weight, Female (lb): 123.98 lb  BMI (Calculated): 25.8    Estimated/Assessed Needs    Weight Used For Calorie Calculations: 70.3 kg (154 lb 15.7 oz)         Energy Need Method: Kcal/kg (1757 - 2635)     RMR ([place]-St. Jeor Equation): 1118.88        Weight Used For Protein Calculations: 70.3 kg (154 lb 15.7 oz)  Protein Requirements: 87.8 g     Fluid Need Method: RDA Method, other (see comments) (or per MD)    Assessment and Plan    Severe malnutrition    Related to (etiology):   Poor oral intake    Signs and Symptoms (as evidenced by):   Severe muscle mass loss (i.e. Hollow, scooping in temporal region), Severe fat loss (i.e. Pronounced hollowness in orbital region)     Interventions/Recommendations (treatment " strategy):  1) Appetite stimulant 2) Boost Plus chocolate TID (nectar thick consistency) 3) RD to monitor weight, po intake/tolerance    Nutrition Diagnosis Status:   New              Monitor and Evaluation    Food and Nutrient Intake: energy intake, food and beverage intake  Food and Nutrient Adminstration: diet order, other (specify) (supplement order)  Anthropometric Measurements: weight, weight change, body mass index  Biochemical Data, Medical Tests and Procedures: electrolyte and renal panel, gastrointestinal profile, inflammatory profile, lipid profile  Nutrition-Focused Physical Findings: overall appearance, head and eyes, skin    Nutrition Risk    Level of Risk: other (see comments) (f/u 2x weekly)    Nutrition Follow-Up    RD Follow-up?: Yes

## 2017-07-31 NOTE — PLAN OF CARE
Problem: Physical Therapy Goal  Goal: Physical Therapy Goal  Goals to be met by: 17    Patient will increase functional independence with mobility by performin. Supine to sit with Moderate Assistance  2. Sit to stand transfer with Moderate Assistance  3. Gait x50ft with RW and moderate assistance  4. Lower extremity exercise program x30 reps per handout, with assistance as needed      Outcome: Ongoing (interventions implemented as appropriate)    Patient making limited progress with PT.

## 2017-07-31 NOTE — ASSESSMENT & PLAN NOTE
Related to (etiology):   Poor oral intake    Signs and Symptoms (as evidenced by):   Severe muscle mass loss (i.e. Hollow, scooping in temporal region), Severe fat loss (i.e. Pronounced hollowness in orbital region)     Interventions/Recommendations (treatment strategy):  1) Appetite stimulant 2) Boost Plus chocolate TID (nectar thick consistency) 3) RD to monitor weight, po intake/tolerance    Nutrition Diagnosis Status:   New

## 2017-07-31 NOTE — ASSESSMENT & PLAN NOTE
With pulmonary edema likely due to hypoalbuminemia and fluids. This may be a sign of slight decompensation. Stopped fluids. Check BG frequently as she has poor PO intake. Currently WNL. Not in respiratory distress today. Hold off on lasix.

## 2017-07-31 NOTE — PLAN OF CARE
Problem: Patient Care Overview  Goal: Plan of Care Review  07/31/2017    Recommendations  1) Appetite stimulant 2) Boost Plus chocolate TID (nectar thick consistency) 3) RD to monitor weight, po intake/tolerance  Goals: 1) Patient to meet >=50% of meals 2) Patient to utilize oral supplements  Nutrition Goal Status: new  Communication of RD Recs: reviewed with JUAN Lynch, MPH, RD, LDN

## 2017-07-31 NOTE — PT/OT/SLP PROGRESS
Physical Therapy  Treatment    Rachael Almaguer   MRN: [id number]   Admitting Diagnosis: PUD (peptic ulcer disease)    PT Received On: 07/31/17  PT Start Time: 1340     PT Stop Time: 1359    PT Total Time (min): 19 min       Billable Minutes:  Therapeutic Activity  19     Treatment Type: Treatment  PT/PTA: PT     PTA Visit Number: 0       General Precautions: Standard, aspiration, fall, pureed diet, nectar thick, hard of hearing   Orthopedic Precautions: N/A   Braces: N/A    Subjective:  Communicated with nurse Alize prior to session.  Patient ready to get back in bed.     Pain/Comfort  Pain Rating 1: 0/10    Objective:   Patient found with: telemetry, oxygen, PICC line    Functional Mobility:  Bed Mobility:   Sit to Supine: Minimum Assistance, With leg lift    Transfers:  Sit <> Stand Assistance: Maximum Assistance  Sit <> Stand Assistive Device:  (x2 person assist from low bedside chair)    Gait:   Gait Distance: ~3 steps from bedside chair to bed   Assistance 1: Moderate assistance (x2 person assist)  Gait Assistive Device: Hand held assist  Gait Deviation(s): decreased maura, increased time in double stance, decreased velocity of limb motion, decreased step length, decreased toe-to-floor clearance, decreased weight-shifting ability    Balance:   Static Sit: GOOD-: Takes MODERATE challenges from all directions but inconsistently  Dynamic Sit: FAIR+: Maintains balance through MINIMAL excursions of active trunk motion  Static Stand: POOR: Needs MODERATE assist to maintain    AM-PAC 6 CLICK MOBILITY  How much help from another person does this patient currently need?   1 = Unable, Total/Dependent Assistance  2 = A lot, Maximum/Moderate Assistance  3 = A little, Minimum/Contact Guard/Supervision  4 = None, Modified [place]/Independent    Turning over in bed (including adjusting bedclothes, sheets and blankets)?: 2  Sitting down on and standing up from a chair with arms (e.g., wheelchair, bedside commode, etc.):  2  Moving from lying on back to sitting on the side of the bed?: 2  Moving to and from a bed to a chair (including a wheelchair)?: 2  Need to walk in hospital room?: 1  Climbing 3-5 steps with a railing?: 1  Total Score: 10    AM-PAC Raw Score CMS G-Code Modifier Level of Impairment Assistance   6 % Total / Unable   7 - 9 CM 80 - 100% Maximal Assist   10 - 14 CL 60 - 80% Moderate Assist   15 - 19 CK 40 - 60% Moderate Assist   20 - 22 CJ 20 - 40% Minimal Assist   23 CI 1-20% SBA / CGA   24 CH 0% Independent/ Mod I     Patient left supine with all lines intact, call button in reach, nurse Alize notified and family friend  present.    Assessment:  Rachael Almaguer is a 89 y.o. female with a medical diagnosis of PUD (peptic ulcer disease) and presents with decreased strength and impaired endurance. Patient with limited progress in PT. She did tolerate sitting in bedside chair ~1.5 hours today. She would continue to benefit from PT at this time in order to get stronger prior to discharge to hospice.    Rehab identified problem list/impairments: Rehab identified problem list/impairments: weakness, impaired endurance, impaired functional mobilty, gait instability, impaired balance, impaired cognition, decreased safety awareness, decreased lower extremity function, decreased ROM, impaired cardiopulmonary response to activity, decreased coordination    Rehab potential is fair.    Activity tolerance: Poor    Discharge recommendations: Discharge Facility/Level Of Care Needs: other (see comments) (family wants hospice)     Barriers to discharge: Barriers to Discharge: Decreased caregiver support (patient lives alone )    Equipment recommendations: Equipment Needed After Discharge: wheelchair, hospital bed     GOALS:    Physical Therapy Goals        Problem: Physical Therapy Goal    Goal Priority Disciplines Outcome Goal Variances Interventions   Physical Therapy Goal     PT/OT, PT Ongoing (interventions implemented as  appropriate)     Description:  Goals to be met by: 17    Patient will increase functional independence with mobility by performin. Supine to sit with Moderate Assistance  2. Sit to stand transfer with Moderate Assistance  3. Gait x50ft with RW and moderate assistance  4. Lower extremity exercise program x30 reps per handout, with assistance as needed                     PLAN:    Patient to be seen 6 x/week  to address the above listed problems via gait training, therapeutic activities, therapeutic exercises, wheelchair management/training  Plan of Care expires: 17  Plan of Care reviewed with: patient, friend    Lily Walton, PT, MOT  2017

## 2017-08-01 PROBLEM — D62 ACUTE BLOOD LOSS ANEMIA: Status: ACTIVE | Noted: 2017-08-01

## 2017-08-01 PROBLEM — K92.2 GI BLEEDING: Status: ACTIVE | Noted: 2017-08-01

## 2017-08-01 LAB
ALBUMIN SERPL BCP-MCNC: 2 G/DL
ALP SERPL-CCNC: 78 U/L
ALT SERPL W/O P-5'-P-CCNC: 25 U/L
ANION GAP SERPL CALC-SCNC: 6 MMOL/L
AST SERPL-CCNC: 55 U/L
BASOPHILS # BLD AUTO: 0.02 K/UL
BASOPHILS NFR BLD: 0.1 %
BILIRUB SERPL-MCNC: 2.3 MG/DL
BUN SERPL-MCNC: 31 MG/DL
CALCIUM SERPL-MCNC: 8.4 MG/DL
CHLORIDE SERPL-SCNC: 103 MMOL/L
CO2 SERPL-SCNC: 29 MMOL/L
CREAT SERPL-MCNC: 0.8 MG/DL
DIFFERENTIAL METHOD: ABNORMAL
EOSINOPHIL # BLD AUTO: 0 K/UL
EOSINOPHIL NFR BLD: 0.2 %
ERYTHROCYTE [DISTWIDTH] IN BLOOD BY AUTOMATED COUNT: 18 %
EST. GFR  (AFRICAN AMERICAN): >60 ML/MIN/1.73 M^2
EST. GFR  (NON AFRICAN AMERICAN): >60 ML/MIN/1.73 M^2
GLUCOSE SERPL-MCNC: 101 MG/DL
HCT VFR BLD AUTO: 34.1 %
HGB BLD-MCNC: 11.2 G/DL
LYMPHOCYTES # BLD AUTO: 1 K/UL
LYMPHOCYTES NFR BLD: 7.2 %
MAGNESIUM SERPL-MCNC: 1.7 MG/DL
MCH RBC QN AUTO: 33.3 PG
MCHC RBC AUTO-ENTMCNC: 32.8 G/DL
MCV RBC AUTO: 102 FL
MONOCYTES # BLD AUTO: 1.3 K/UL
MONOCYTES NFR BLD: 9.7 %
NEUTROPHILS # BLD AUTO: 11.2 K/UL
NEUTROPHILS NFR BLD: 82.5 %
PHOSPHATE SERPL-MCNC: 2.8 MG/DL
PLATELET # BLD AUTO: 255 K/UL
PMV BLD AUTO: 9.3 FL
POCT GLUCOSE: 114 MG/DL (ref 70–110)
POCT GLUCOSE: 133 MG/DL (ref 70–110)
POCT GLUCOSE: 152 MG/DL (ref 70–110)
POTASSIUM SERPL-SCNC: 5 MMOL/L
PROT SERPL-MCNC: 6.2 G/DL
RBC # BLD AUTO: 3.36 M/UL
SODIUM SERPL-SCNC: 138 MMOL/L
WBC # BLD AUTO: 13.55 K/UL

## 2017-08-01 PROCEDURE — 25000003 PHARM REV CODE 250: Performed by: INTERNAL MEDICINE

## 2017-08-01 PROCEDURE — 12000002 HC ACUTE/MED SURGE SEMI-PRIVATE ROOM

## 2017-08-01 PROCEDURE — 85025 COMPLETE CBC W/AUTO DIFF WBC: CPT

## 2017-08-01 PROCEDURE — 63600175 PHARM REV CODE 636 W HCPCS: Performed by: INTERNAL MEDICINE

## 2017-08-01 PROCEDURE — 27000221 HC OXYGEN, UP TO 24 HOURS

## 2017-08-01 PROCEDURE — C9113 INJ PANTOPRAZOLE SODIUM, VIA: HCPCS | Performed by: INTERNAL MEDICINE

## 2017-08-01 PROCEDURE — 97110 THERAPEUTIC EXERCISES: CPT

## 2017-08-01 PROCEDURE — 94761 N-INVAS EAR/PLS OXIMETRY MLT: CPT

## 2017-08-01 PROCEDURE — 83735 ASSAY OF MAGNESIUM: CPT

## 2017-08-01 PROCEDURE — 84100 ASSAY OF PHOSPHORUS: CPT

## 2017-08-01 PROCEDURE — 80053 COMPREHEN METABOLIC PANEL: CPT

## 2017-08-01 RX ORDER — AMOXICILLIN AND CLAVULANATE POTASSIUM 400; 57 MG/5ML; MG/5ML
800 POWDER, FOR SUSPENSION ORAL EVERY 12 HOURS
Status: DISCONTINUED | OUTPATIENT
Start: 2017-08-01 | End: 2017-08-02 | Stop reason: HOSPADM

## 2017-08-01 RX ADMIN — PANTOPRAZOLE SODIUM 40 MG: 40 INJECTION, POWDER, FOR SOLUTION INTRAVENOUS at 09:08

## 2017-08-01 RX ADMIN — METOPROLOL TARTRATE 50 MG: 50 TABLET ORAL at 09:08

## 2017-08-01 RX ADMIN — AMOXICILLIN AND CLAVULANATE POTASSIUM 1 TABLET: 875; 125 TABLET, FILM COATED ORAL at 09:08

## 2017-08-01 RX ADMIN — MICONAZOLE NITRATE: 20 CREAM TOPICAL at 09:08

## 2017-08-01 RX ADMIN — MICONAZOLE NITRATE: 20 CREAM TOPICAL at 10:08

## 2017-08-01 NOTE — PROGRESS NOTES
"[facility] Medicine  Progress Note    Patient Name: Rachael Almaguer  MRN: [id number]  Patient Class: IP- Inpatient   Admission Date: 7/20/2017  Length of Stay: 12 days  Attending Physician: Lisa Ramos MD  Primary Care Provider: Boris Carmichael MD        Subjective:     Principal Problem:PUD (peptic ulcer disease)    HPI:  Rachael Almaguer is a 89 y.o. with medical history HTN, CAD, and depression. She presented for evaluation of 5 day history of hallucinations since discharge from this hospital on Saturday. Patient was admitted for urinary retension. History taken from patients chart and patient.    Per patient, who surprisingly gives good history except for the visual hallucinations, she had been seeing people who are not there. She tells me that her 10 year old granddaughter is in the chair in the room during history taking. However, she is able to tell me that on Monday she saw her PCP who told her "you don't look good". She also saw her cardiologist (Dr. Whiting) today who told her to come to the hospital. Additionally, patient reports BLLE swelling.    Patient is found to have significant drop in H/H since 2 weeks ago was 14.1/41.0 now 10.5/30.9; dehydration with BUN/CR/GFR 57/1.8/24 compared to Yonatan her renal functions were normal.            Hospital Course:  Ms. Almaguer was admitted with AMS of uncertain etiology in the beginning, but quickly became clear after repeat BMP showed Na was 117 causing metabolic encephalopathy. Pt had negative head CT and further workup with MRI was unremarkable. Neurology following. Pt with hypovolemia causing hyponatremia and all diuretics stopped and steady correction with NS in progress with close monitoring of electrolytes. Patient was transferred to the ICU on 7/22 for acute GI bleed.  GI was consulted and took the patient to EGD the same day.  Blood was found in the entire stomach.  The patient received multiple units of blood. She " continued to bleed and the patient was taken back to EGD on 7/23. This showed non-bleeding esophageal ulcer as well as a non-bleeding gastric ulcer with cautery and clips placed.  GI recommended continued ICU monitoring.  Patient is DNR. Patient received blood again on 7/23. She received a total of 4 units of blood. The patient was very agitated on 7/23 and Zyprexa was started. The patient's H/H stabilized since 7/23 and the patient was sent to the floor on 7/24. No further evidence of GI bleed since. Speech, PT/OT were consulted on 7/24. Has declined significantly and at times is unwilling to participate as she feels exhausted. Speech recommended pureed with nectar thickened liquids, but unfortunately patient continues to show signs of aspiration as she tends to cough after meals. An MBBS was attempted on 7/27 but patient adamantly refused. Metoprolol increased 50 mg BID for better control of AFib. Anticoagulation on hold indefinitely. Patient and family aware that AFib increases risk for stroke when not on anticoagulation. Verbalized understanding. Also third-spacing and evidently with pulmonary edema. Trial of IV lasix but will need to continue very gently hydration with D5W as PO intake is extremely limited and is at risk for hypoglycemia. Delirium and visual hallucinations continue to be an issue. Barely sleeps at night despite efforts. Psych consulted for recs on this matter. I am concerned that patient will not achieve meaningful recovery in order to transfer to a SNF. Is evidently malnourished, is third spacing, has oropharyngeal dysphagia on precautions, with signs of aspiration despite these precautions, gets exhausted very easily and has expressed that she just wants to lay in bed. Declined MBBS and has expressed not wanting to proceed with PT/OT. Is no interested in nursing facility but rather adamant on going home. Lives alone and was fairly independent up until a month ago or so (per daughter and  daughter in law). Son is only one who lives close. Is DNR and is hospice appropriate. Palliative care on board. Meeting with patient's three sons and daughter held on 7/28 along with palliative care and social work. Long discussion about patient's current clinical status and recommendation for hospice services. Overall, they all expressed agreement with hospice as best next step but only one son lives in [place] and is unable to provide 24-hour care for patient at home. Other two sons and daughter expressed difficulty meeting such demand as well. They also expressed not able to afford a private sitter.will plan with family for Hospice placement.    Interval History: no change in clinical status    Review of Systems   HENT: Negative.    Eyes: Negative.    Respiratory: Negative.    Cardiovascular: Negative.    Gastrointestinal: Negative.    Endocrine: Negative.    Genitourinary: Negative.    Neurological: Positive for weakness.   Hematological: Negative.    Psychiatric/Behavioral: Positive for confusion and hallucinations. Negative for agitation, behavioral problems and sleep disturbance.     Objective:     Vital Signs (Most Recent):  Temp: 98.5 °F (36.9 °C) (08/01/17 0400)  Pulse: (!) 117 (08/01/17 0400)  Resp: 18 (08/01/17 0400)  BP: (!) 101/59 (08/01/17 0400)  SpO2: 95 % (08/01/17 0400) Vital Signs (24h Range):  Temp:  [97.1 °F (36.2 °C)-98.5 °F (36.9 °C)] 98.5 °F (36.9 °C)  Pulse:  [] 117  Resp:  [16-19] 18  SpO2:  [94 %-98 %] 95 %  BP: (101-109)/(51-68) 101/59     Weight: 70.3 kg (154 lb 15.7 oz)  Body mass index is 25.79 kg/m².    Intake/Output Summary (Last 24 hours) at 08/01/17 0756  Last data filed at 08/01/17 0700   Gross per 24 hour   Intake              240 ml   Output                1 ml   Net              239 ml      Physical Exam   Constitutional: She appears well-developed.   Thin and frail.   HENT:   Head: Normocephalic.   +temporal wasting, hard of hearing.   Eyes: EOM are normal. Pupils are equal,  round, and reactive to light.   Neck: Normal range of motion. Neck supple.   Cardiovascular: Normal rate and regular rhythm.    Pulmonary/Chest: Effort normal. No respiratory distress. She has rales.   Abdominal: Soft. She exhibits no distension.   Musculoskeletal: Normal range of motion. She exhibits no edema.   Neurological: She is alert. No cranial nerve deficit. She exhibits normal muscle tone.   Disoriented to time, but oriented to place and person. Intermittent Visual hallucinations.    Skin: Skin is warm and dry.   Psychiatric: Her affect is not labile. Her speech is not rapid and/or pressured. She is not agitated and not actively hallucinating. She exhibits abnormal recent memory and abnormal remote memory.   Nursing note and vitals reviewed.      Significant Labs: All pertinent labs within the past 24 hours have been reviewed.    Significant Imaging: I have reviewed all pertinent imaging results/findings within the past 24 hours.  I have reviewed and interpreted all pertinent imaging results/findings within the past 24 hours.    Assessment/Plan:      GI bleeding    Duo to gastric ulcer,on PPI.          Acute blood loss anemia    Duo to GI bleeding,S/P PRBC,stable at this time.          Visual hallucination    As above          Pulmonary edema    As above          Paroxysmal atrial fibrillation    Off anticoagulation. Increased metoprolol. Still slightly tachy but will treat SOB as possible culprit. Management of pulm edema as above          Oropharyngeal dysphagia    Continued pureed diet with nectar thickened liquids. Only eating ~25% of meals. Mostly eating ice cream and pudding. Cough with meals despite restrictions. This is less frequent now. Speech attempted MBBS but unsuccessful as patient declined study. Will continue current diet but family is aware that there are risks for aspiration. Providing but with one-to-one assistance.           Physical deconditioning    consulted PT/OT. Recommending SNF  but this might not be ultimate goal. PPD placed.  aware          DNR (do not resuscitate)    Reviewed with patient and family, she is DNR. Palliative care consulted for counseling and [place]POST filled out. Palliative care following.           Metabolic encephalopathy    Unclear etiology at first given Na level was normal, but repeat level markedly different with hyponatremia of 117 and repeat confirmed to be true value. Workup with head CT and MRI negative, non-focal exam. Recent admit for urinary retention made infectious process a consideration but all cultures are negative. Is better, but I believe she may still be experience side effect from sedation given for EGD. Patient was not sleeping well at nights but sleeping all day.  It appears she has been encephalopathic since admission, however it was purely metabolic at first, now this is purely delirium with visual hallucinations. May be her new baseline. Attempt to have patient up in chair if she agrees. Empitic abx treatment with augmentin in case aspiration PNA and/or UTI (unable to obtain sample). Frequent reorientation. Blinds open during the day time, have family around at all times          Urinary retention    Pt was here last week for urinary retention and started on flomax. Bladder scan and straight cath if needed. Has incontinence. Renal funciton stable        Chronic diastolic congestive heart failure    With pulmonary edema likely due to hypoalbuminemia and fluids. This may be a sign of slight decompensation. Stopped fluids. Check BG frequently as she has poor PO intake. Currently WNL. Not in respiratory distress today. Hold off on lasix.         * PUD (peptic ulcer disease)    Patient had EGD on 7/22 and 7/23. 7/22- unable to visualize secondary to blood. Transfused blood. Second EGD on 7/23- esophageal and gastric ulcers with cautery and clips placed to gastric ulcer. Received further blood on 7/23- total of 4 units since admission. Now  resolved. Off all anticoagulation. Change CBC to every other day. Changed PPI to BID. Continue via IV as patient likely to decline PO dose.             VTE Risk Mitigation         Ordered     Medium Risk of VTE  Once      07/21/17 0149     Reason for No Pharmacological VTE Prophylaxis  Once      07/21/17 0149          Lisa Ramos MD  Department of Hospital Medicine   [facility]

## 2017-08-01 NOTE — PLAN OF CARE
Problem: Patient Care Overview  Goal: Plan of Care Review  Outcome: Ongoing (interventions implemented as appropriate)  Patient awake most of the day with family at bedside.    Plan for meeting with  tomorrow with family for patient's outpatient needs.

## 2017-08-01 NOTE — PROGRESS NOTES
America with [facility] ([facility]) returned SW phone call. SW added [facility] to treatment team through [facility]. SW requested for patient clinicals to be reviewed to determine if meets criteria for inpatient hospice.

## 2017-08-01 NOTE — PLAN OF CARE
Problem: Physical Therapy Goal  Goal: Physical Therapy Goal  Goals to be met by: 17    Patient will increase functional independence with mobility by performin. Supine to sit with Moderate Assistance  2. Sit to stand transfer with Moderate Assistance  3. Gait x50ft with RW and moderate assistance  4. Lower extremity exercise program x30 reps per handout, with assistance as needed      Outcome: Ongoing (interventions implemented as appropriate)  Continue with POC.

## 2017-08-01 NOTE — PT/OT/SLP PROGRESS
"Physical Therapy  Treatment    Rachael Almaguer   MRN: [id number]   Admitting Diagnosis: PUD (peptic ulcer disease)    PT Received On: 08/01/17  PT Start Time: 1518     PT Stop Time: 1530    PT Total Time (min): 12 min       Billable Minutes:  Therapeutic Exercise 12    Treatment Type: Treatment  PT/PTA: PTA     PTA Visit Number: 1       General Precautions: Standard, fall,aspiration, pureed diet, hearing impaired, honey thick   Orthopedic Precautions: N/A   Braces:           Subjective:  Communicated with nurse Jacquelyn prior to session.  Pt agreeable to supine BLE ex's. Pt refused to transfer to bedside chair despite encouragement from pt's son and PTA.     Pain/Comfort  Pain Rating 1: 0/10  Pain Rating Post-Intervention 1: 0/10    Objective:   Patient found with: oxygen, pressure relief boots, telemetry, PICC line    Therapeutic Activities and Exercises:  Pt performed supine BLE ex's ( AAROM ) : AP, SAQ, HS, hip ABD/ADD. Noted pt is talking to someone who is not there during therapy treatment , pt's son stated " she has seen things "   Educated and demonstrated to pt's son  proper technique with PROM/AAROM BLE in all available planes. Pt verbalize understand.     AM-PAC 6 CLICK MOBILITY  How much help from another person does this patient currently need?   1 = Unable, Total/Dependent Assistance  2 = A lot, Maximum/Moderate Assistance  3 = A little, Minimum/Contact Guard/Supervision  4 = None, Modified [place]/Independent    Turning over in bed (including adjusting bedclothes, sheets and blankets)?: 2  Sitting down on and standing up from a chair with arms (e.g., wheelchair, bedside commode, etc.): 2  Moving from lying on back to sitting on the side of the bed?: 2  Moving to and from a bed to a chair (including a wheelchair)?: 2  Need to walk in hospital room?: 2  Climbing 3-5 steps with a railing?: 1  Total Score: 11    AM-PAC Raw Score CMS G-Code Modifier Level of Impairment Assistance   6 % Total / " Unable   7 - 9 CM 80 - 100% Maximal Assist   10 - 14 CL 60 - 80% Moderate Assist   15 - 19 CK 40 - 60% Moderate Assist   20 - 22 CJ 20 - 40% Minimal Assist   23 CI 1-20% SBA / CGA   24 CH 0% Independent/ Mod I     Patient left supine with pressure relief boots placed  all lines intact, call button in reach, nurse Jacquelyn notified and son present.    Assessment:  Rachael Almaguer is a 89 y.o. female with a medical diagnosis of PUD (peptic ulcer disease) .    Rehab identified problem list/impairments: Rehab identified problem list/impairments: weakness, impaired endurance, impaired cognition, impaired functional mobilty, impaired self care skills, decreased lower extremity function, decreased upper extremity function, gait instability, decreased coordination, impaired cardiopulmonary response to activity, decreased ROM, edema    Rehab potential is fair.    Activity tolerance: Fair    Discharge recommendations: Discharge Facility/Level Of Care Needs: nursing facility, skilled     Barriers to discharge:   Barriers to Discharge: Decreased caregiver support (patient lives alone )    Equipment recommendations:   Equipment Needed After Discharge: wheelchair     GOALS:    Physical Therapy Goals        Problem: Physical Therapy Goal    Goal Priority Disciplines Outcome Goal Variances Interventions   Physical Therapy Goal     PT/OT, PT Ongoing (interventions implemented as appropriate)     Description:  Goals to be met by: 17    Patient will increase functional independence with mobility by performin. Supine to sit with Moderate Assistance  2. Sit to stand transfer with Moderate Assistance  3. Gait x50ft with RW and moderate assistance  4. Lower extremity exercise program x30 reps per handout, with assistance as needed                       PLAN:    Patient to be seen 6 x/week  to address the above listed problems via gait training, therapeutic activities, therapeutic exercises, wheelchair management/training  Plan of  Care expires: 08/08/17  Plan of Care reviewed with: patient, friend         Aysha Krause, CHRISTINE  08/01/2017

## 2017-08-01 NOTE — SUBJECTIVE & OBJECTIVE
Interval History: no change in clinical status    Review of Systems   HENT: Negative.    Eyes: Negative.    Respiratory: Negative.    Cardiovascular: Negative.    Gastrointestinal: Negative.    Endocrine: Negative.    Genitourinary: Negative.    Neurological: Positive for weakness.   Hematological: Negative.    Psychiatric/Behavioral: Positive for confusion and hallucinations. Negative for agitation, behavioral problems and sleep disturbance.     Objective:     Vital Signs (Most Recent):  Temp: 98.5 °F (36.9 °C) (08/01/17 0400)  Pulse: (!) 117 (08/01/17 0400)  Resp: 18 (08/01/17 0400)  BP: (!) 101/59 (08/01/17 0400)  SpO2: 95 % (08/01/17 0400) Vital Signs (24h Range):  Temp:  [97.1 °F (36.2 °C)-98.5 °F (36.9 °C)] 98.5 °F (36.9 °C)  Pulse:  [] 117  Resp:  [16-19] 18  SpO2:  [94 %-98 %] 95 %  BP: (101-109)/(51-68) 101/59     Weight: 70.3 kg (154 lb 15.7 oz)  Body mass index is 25.79 kg/m².    Intake/Output Summary (Last 24 hours) at 08/01/17 0756  Last data filed at 08/01/17 0700   Gross per 24 hour   Intake              240 ml   Output                1 ml   Net              239 ml      Physical Exam   Constitutional: She appears well-developed.   Thin and frail.   HENT:   Head: Normocephalic.   +temporal wasting, hard of hearing.   Eyes: EOM are normal. Pupils are equal, round, and reactive to light.   Neck: Normal range of motion. Neck supple.   Cardiovascular: Normal rate and regular rhythm.    Pulmonary/Chest: Effort normal. No respiratory distress. She has rales.   Abdominal: Soft. She exhibits no distension.   Musculoskeletal: Normal range of motion. She exhibits no edema.   Neurological: She is alert. No cranial nerve deficit. She exhibits normal muscle tone.   Disoriented to time, but oriented to place and person. Intermittent Visual hallucinations.    Skin: Skin is warm and dry.   Psychiatric: Her affect is not labile. Her speech is not rapid and/or pressured. She is not agitated and not actively  hallucinating. She exhibits abnormal recent memory and abnormal remote memory.   Nursing note and vitals reviewed.      Significant Labs: All pertinent labs within the past 24 hours have been reviewed.    Significant Imaging: I have reviewed all pertinent imaging results/findings within the past 24 hours.  I have reviewed and interpreted all pertinent imaging results/findings within the past 24 hours.

## 2017-08-01 NOTE — PHYSICIAN QUERY
"PT Name: Rachael Almaguer  MR #: [id number]     Physician Query Form - Documentation Clarification      CDS/: Nina Hyde RN CDI        Contact information: [phone]    This form is a permanent document in the medical record.     Query Date: August 1, 2017    By submitting this query, we are merely seeking further clarification of documentation. Please utilize your independent clinical judgment when addressing the question(s) below.    The Medical record reflects the following:    Supporting Clinical Findings Location in Medical Record     GI bleeding   Duo to gastric ulcer,on PPI.    Acute blood loss anemia   Duo to GI bleeding,S/P PRBC,stable at this time.        H&P          H&P     7/22/2017 10:59  UNIT NUMBER: [id number]  PRODUCT CODE: [id number]  DISPENSE STATUS: TRANSFUSED    7/22/2017 10:59  UNIT NUMBER: [id number]  PRODUCT CODE: [id number]  DISPENSE STATUS: TRANSFUSED    7/22/2017 10:59  UNIT NUMBER: [id number]  PRODUCT CODE: [id number]  DISPENSE STATUS: TRANSFUSED        LAB Blood bank                                                                            Doctor, Please specify diagnosis or diagnoses associated with above clinical findings.    Doctor, please specify the acuity of the "Gastric Ulcer"    Provider Use Only      [ x  ] Acute    [   ] Acute on Chronic    [   ] Chronic    [   ] Other, specify ____________.    [   ] Unable to determine.                                                                                                           [  ] Clinically undetermined            "

## 2017-08-01 NOTE — PROGRESS NOTES
America CLINE) with [facility] contacted  to report patient does meet criteria for inpatient hospice and facility can accept patient. CLAUDIA contacted Marisela (Palliative Care Nurse) to provide update, Marisela informed CLAUDIA she will contact patient daughter Teodora to provide update and request for a family meeting tomorrow to get a final decision from family. CLAUDIA contacted patient daughter-in-law Heather to provide update, Heather informed CLAUDIA she told her  about inpatient hospice and he want to speak with the attending MD to get an opinion on if patient is appropriate for hospice. CLAUDIA reminded Heather family all met with attending MD on Monday for a family meeting and talked about why patient need hospice services. CLAUDIA informed Heather she will inform attending MD family would like to meet with him.     CLAUDIA called America CLINE) with [facility] back to report hospital attempting to discharge patient tomorrow.

## 2017-08-01 NOTE — PLAN OF CARE
CLAUDIA contacted  House @ [phone] to speak with admissions, CLAUDIA would like  House to review patient clinicals to determine if patient appropriate for inpatient hospice services, office reported admissions coordinator will have to call CLAUDIA. CLAUDIA provided office with contact phone number, waiting on return phone call.         08/01/17 1128   Discharge Reassessment   Assessment Type Discharge Planning Reassessment   Discharge plan remains the same: Yes   Discharge Plan A Inpatient Hospice   Discharge Plan B Home with family;Hospice/home   Change in patient condition or support system Yes   Explained to the the patient/caregiver why the discharge planned changed: Yes   Involved the patient/caregiver in establishing a new discharge plan: Yes

## 2017-08-02 VITALS
TEMPERATURE: 98 F | RESPIRATION RATE: 20 BRPM | OXYGEN SATURATION: 98 % | HEART RATE: 102 BPM | HEIGHT: 65 IN | WEIGHT: 155 LBS | DIASTOLIC BLOOD PRESSURE: 52 MMHG | BODY MASS INDEX: 25.83 KG/M2 | SYSTOLIC BLOOD PRESSURE: 139 MMHG

## 2017-08-02 PROCEDURE — G8989 SELF CARE D/C STATUS: HCPCS | Mod: CL

## 2017-08-02 PROCEDURE — 25000003 PHARM REV CODE 250: Performed by: HOSPITALIST

## 2017-08-02 PROCEDURE — 25000003 PHARM REV CODE 250: Performed by: INTERNAL MEDICINE

## 2017-08-02 PROCEDURE — C9113 INJ PANTOPRAZOLE SODIUM, VIA: HCPCS | Performed by: INTERNAL MEDICINE

## 2017-08-02 PROCEDURE — 63600175 PHARM REV CODE 636 W HCPCS: Performed by: INTERNAL MEDICINE

## 2017-08-02 RX ADMIN — MICONAZOLE NITRATE: 20 CREAM TOPICAL at 08:08

## 2017-08-02 RX ADMIN — AMOXICILLIN AND CLAVULANATE POTASSIUM 800 MG: 400; 57 POWDER, FOR SUSPENSION ORAL at 08:08

## 2017-08-02 RX ADMIN — METOPROLOL TARTRATE 50 MG: 50 TABLET ORAL at 08:08

## 2017-08-02 RX ADMIN — PANTOPRAZOLE SODIUM 40 MG: 40 INJECTION, POWDER, FOR SOLUTION INTRAVENOUS at 08:08

## 2017-08-02 NOTE — PROGRESS NOTES
PALLIATIVE CARE PROGRESS NOTE:    Bedside visit with Dr. Marcelino and CLAUDIA Snowden, patient's son and daughter-in-law, as well as son Se and daughter Teodora both on speaker phone.  Patient is awake, but inattentive, does interact occasionally with family.  Frail.      Dr. Marcelino provided status update, patient without improvement, still with delirium, and team recommendation for inpatient hospice care.  Family lives out of town (except son Se) and unable to provide round the clock care for her.     Family now all agreeable to inpatient hospice at [facility]. [place]POST/DNR document was completed and signed by son.  Copies provided to family, original placed in blue folder to be scanned into EPIC.      Plan:  Transfer to inpatient hospice at [facility] today.    Viridiana Cherry, LIN, RN, CCRN, CHPN   Palliative Care Nurse Coordinator   [facility]  [phone]

## 2017-08-02 NOTE — PROGRESS NOTES
Follow-up Information     [facility]. Go on 8/2/2017.    Specialty:  Hospice Services  Why:  Hospice Facility  Contact information:  [address] [address]  [phone]             [facility].    Specialty:  Hospice and Palliative Medicine  Why:  Hospice Provider  Contact information:  [address] [address]  [phone]                   [facility]    WRITTEN HEALTHCARE AND DISCHARGE INFORMATION                            Help at Home           [phone]  After discharge for assistance [facility] On Call Nurse Care Line 24/7  Assistance    Things You are responsible For To Manage Your Care At Home:  1.    Getting your prescriptions filled   2.    Taking your medications as directed, DO NOT MISS ANY DOSES!  3.    Going to your follow-up doctor appointment. This is important because it  allow the doctor to monitor your progress and determine if  any changes need to made to your treatment plan.     Thank you for choosing [facility] for your care.  Please answer any calls you may receive from [facility] we want to continue to support you as you manage your healthcare needs. [facility] is happy to have the opportunity to serve you.     Sincerely,  Your [facility] Team,  Isi Morris [facility] [place]   II  [phone]

## 2017-08-02 NOTE — SUBJECTIVE & OBJECTIVE
Interval History: no change in clinical status    Review of Systems   HENT: Negative.    Eyes: Negative.    Respiratory: Negative.    Cardiovascular: Negative.    Gastrointestinal: Negative.    Endocrine: Negative.    Genitourinary: Negative.    Neurological: Positive for weakness.   Hematological: Negative.    Psychiatric/Behavioral: Positive for confusion and hallucinations. Negative for agitation, behavioral problems and sleep disturbance.     Objective:     Vital Signs (Most Recent):  Temp: 97.9 °F (36.6 °C) (08/02/17 0400)  Pulse: (!) 120 (08/02/17 0400)  Resp: (!) 23 (08/02/17 0400)  BP: (!) 150/73 (08/02/17 0400)  SpO2: 96 % (08/02/17 0400) Vital Signs (24h Range):  Temp:  [97.1 °F (36.2 °C)-98 °F (36.7 °C)] 97.9 °F (36.6 °C)  Pulse:  [] 120  Resp:  [16-23] 23  SpO2:  [94 %-96 %] 96 %  BP: (106-150)/(59-73) 150/73     Weight: 70.3 kg (154 lb 15.7 oz)  Body mass index is 25.79 kg/m².    Intake/Output Summary (Last 24 hours) at 08/02/17 0733  Last data filed at 08/02/17 0600   Gross per 24 hour   Intake              720 ml   Output                0 ml   Net              720 ml      Physical Exam   Constitutional: She appears well-developed.   Thin and frail.   HENT:   Head: Normocephalic.   +temporal wasting, hard of hearing.   Eyes: EOM are normal. Pupils are equal, round, and reactive to light.   Neck: Normal range of motion. Neck supple.   Cardiovascular: Normal rate and regular rhythm.    Pulmonary/Chest: Effort normal. No respiratory distress. She has rales.   Abdominal: Soft. She exhibits no distension.   Musculoskeletal: Normal range of motion. She exhibits no edema.   Neurological: She is alert. No cranial nerve deficit. She exhibits normal muscle tone.   Disoriented to time, but oriented to place and person. Intermittent Visual hallucinations.    Skin: Skin is warm and dry.   Psychiatric: Her affect is not labile. Her speech is not rapid and/or pressured. She is not agitated and not actively  hallucinating. She exhibits abnormal recent memory and abnormal remote memory.   Nursing note and vitals reviewed.      Significant Labs: All pertinent labs within the past 24 hours have been reviewed.    Significant Imaging: I have reviewed all pertinent imaging results/findings within the past 24 hours.  I have reviewed and interpreted all pertinent imaging results/findings within the past 24 hours.

## 2017-08-02 NOTE — PLAN OF CARE
08/02/17 1427   Final Note   Assessment Type Final Discharge Note   Discharge Disposition [facility]   Hospital Follow Up  Appt(s) scheduled? No   Discharge plans and expectations educations in teach back method with documentation complete? No   Offered [facility] -- Bedside Delivery? n/a   Discharge/Hospital Encounter Summary to (non-[facility]) PCP n/a   Referral to Outpatient Case Management complete? n/a   Referral to / orders for Home Health Complete? n/a   30 day supply of medicines given at discharge, if documented non-compliance / non-adherence? n/a   Any social issues identified prior to discharge? n/a   Did you assess the readiness or willingness of the family or caregiver to support self management of care? n/a   Right Care Referral Info   Post Acute Recommendation Other   Referral Type Inpatient Hospice   Facility Name [place], [place]

## 2017-08-02 NOTE — PLAN OF CARE
[facility]  [address]  [place] [address]  [phone] after hours  [phone]  Fax                                   HOSPICE  ORDERS     08/02/2017    Admit to Hospice:  Inpatient Service     Diagnoses:  Active Hospital Problems    Diagnosis  POA    *PUD (peptic ulcer disease) [K27.9]  Yes    Acute blood loss anemia [D62]  Yes    GI bleeding [K92.2]  Yes    Severe malnutrition [E43]  Yes    Pulmonary edema [J81.1]  Clinically Undetermined    Visual hallucination [R44.1]  Yes    Oropharyngeal dysphagia [R13.12]  Yes    Paroxysmal atrial fibrillation [I48.0]  Yes    Physical deconditioning [R53.81]  Yes    DNR (do not resuscitate) [Z66]  Yes    Metabolic encephalopathy [G93.41]  Yes    Urinary retention [R33.9]  Yes    Chronic diastolic congestive heart failure [I50.32]  Yes     Chronic      Resolved Hospital Problems    Diagnosis Date Resolved POA    Visual hallucinations [R44.1] 07/26/2017 Yes    GIB (gastrointestinal bleeding) [K92.2] 07/24/2017 Yes    Acute blood loss anemia [D62] 07/24/2017 Yes    Acute renal failure superimposed on stage 3 chronic kidney disease [N17.9, N18.3] 07/24/2017 Yes    Hyponatremia [E87.1] 07/24/2017 Yes       Vital Signs: Routine.    Allergies:  Review of patient's allergies indicates:   Allergen Reactions    Aspirin     Xanax [alprazolam]        Diet: puree diet     Activities: As tolerated    Nursing: Per Hospice Routine    Medications:         Comfort Care Medications Only        Rachael Almaguer   Home Medication Instructions ARGELIA:[id number]    Printed on:08/02/17 1050   Medication Information                      acetaminophen (TYLENOL) 325 MG tablet  Take 325 mg by mouth Daily.prn for fever              busPIRone (BUSPAR) 10 MG tablet  Take 10 mg by mouth 2 (two) times daily.              furosemide (LASIX) 20 MG tablet  Take 1 tablet (20 mg total) by mouth 2 (two) times daily.             lisinopril 10 MG  tablet  Take 1 tablet (10 mg total) by mouth once daily.             metoprolol tartrate (LOPRESSOR) 50 MG tablet  Take 50 mg by mouth 2 (two) times daily.             mirtazapine (REMERON) 15 MG tablet  Take 15 mg by mouth every evening.                                           _________________________________  Lisa Ramos MD  08/02/2017

## 2017-08-02 NOTE — PROGRESS NOTES
CLAUDIA provided nurse Shields with call report information and reported transportation will arrive within the hour. CLAUDIA contacted [facility] @ [phone] to schedule stretcher, ambulance scheduled to arrive within the hour. SW spoke to family and reported transportation should arrive between 3:00pm-3:30pm to transport patient to [facility].

## 2017-08-02 NOTE — PROGRESS NOTES
America CLINE) with [facility] contacted CLAUDIA to provide call report information ([phone], ask for charge nurse).

## 2017-08-02 NOTE — PT/OT/SLP PROGRESS
Speech Language Pathology  Laly Almaguer  MRN: [id number]    Patient not seen today secondary to family request to allow her to rest, awaiting hospice transfer; daughter reports comfortable tolerance of meals. No further ST is warranted.     Shaniqua Natarajan, RADHA-SLP

## 2017-08-02 NOTE — PROGRESS NOTES
America CLINE) with [facility] came to hospital and completed consents with family. America informed CLAUDIA she will call  with call report information when discharge clinicals been reviewed by nursing.

## 2017-08-02 NOTE — PROGRESS NOTES
"[facility]  Progress Note    Patient Name: Rachael Almaguer  MRN: [id number]  Patient Class: IP- Inpatient   Admission Date: 7/20/2017  Length of Stay: 13 days  Attending Physician: Lisa Ramos MD  Primary Care Provider: Boris Carmichael MD        Subjective:     Principal Problem:PUD (peptic ulcer disease)    HPI:  Rachael Almaguer is a 89 y.o. with medical history HTN, CAD, and depression. She presented for evaluation of 5 day history of hallucinations since discharge from this hospital on Saturday. Patient was admitted for urinary retension. History taken from patients chart and patient.    Per patient, who surprisingly gives good history except for the visual hallucinations, she had been seeing people who are not there. She tells me that her 10 year old granddaughter is in the chair in the room during history taking. However, she is able to tell me that on Monday she saw her PCP who told her "you don't look good". She also saw her cardiologist (Dr. Whiting) today who told her to come to the hospital. Additionally, patient reports BLLE swelling.    Patient is found to have significant drop in H/H since 2 weeks ago was 14.1/41.0 now 10.5/30.9; dehydration with BUN/CR/GFR 57/1.8/24 compared to Yonatan her renal functions were normal.            Hospital Course:  Ms. Almaguer was admitted with AMS of uncertain etiology in the beginning, but quickly became clear after repeat BMP showed Na was 117 causing metabolic encephalopathy. Pt had negative head CT and further workup with MRI was unremarkable. Neurology following. Pt with hypovolemia causing hyponatremia and all diuretics stopped and steady correction with NS in progress with close monitoring of electrolytes. Patient was transferred to the ICU on 7/22 for acute GI bleed.  GI was consulted and took the patient to EGD the same day.  Blood was found in the entire stomach.  The patient received multiple units of blood. She " continued to bleed and the patient was taken back to EGD on 7/23. This showed non-bleeding esophageal ulcer as well as a non-bleeding gastric ulcer with cautery and clips placed.  GI recommended continued ICU monitoring.  Patient is DNR. Patient received blood again on 7/23. She received a total of 4 units of blood. The patient was very agitated on 7/23 and Zyprexa was started. The patient's H/H stabilized since 7/23 and the patient was sent to the floor on 7/24. No further evidence of GI bleed since. Speech, PT/OT were consulted on 7/24. Has declined significantly and at times is unwilling to participate as she feels exhausted. Speech recommended pureed with nectar thickened liquids, but unfortunately patient continues to show signs of aspiration as she tends to cough after meals. An MBBS was attempted on 7/27 but patient adamantly refused. Metoprolol increased 50 mg BID for better control of AFib. Anticoagulation on hold indefinitely. Patient and family aware that AFib increases risk for stroke when not on anticoagulation. Verbalized understanding. Also third-spacing and evidently with pulmonary edema. Trial of IV lasix but will need to continue very gently hydration with D5W as PO intake is extremely limited and is at risk for hypoglycemia. Delirium and visual hallucinations continue to be an issue. Barely sleeps at night despite efforts. Psych consulted for recs on this matter. I am concerned that patient will not achieve meaningful recovery in order to transfer to a SNF. Is evidently malnourished, is third spacing, has oropharyngeal dysphagia on precautions, with signs of aspiration despite these precautions, gets exhausted very easily and has expressed that she just wants to lay in bed. Declined MBBS and has expressed not wanting to proceed with PT/OT. Is no interested in nursing facility but rather adamant on going home. Lives alone and was fairly independent up until a month ago or so (per daughter and  daughter in law). Son is only one who lives close. Is DNR and is hospice appropriate. Palliative care on board. Meeting with patient's three sons and daughter held on 7/28 along with palliative care and social work. Long discussion about patient's current clinical status and recommendation for hospice services. Overall, they all expressed agreement with hospice as best next step but only one son lives in [place] and is unable to provide 24-hour care for patient at home. Other two sons and daughter expressed difficulty meeting such demand as well. They also expressed not able to afford a private sitter.will plan with family for Hospice placement.will have family meeting today.    Interval History: no change in clinical status    Review of Systems   HENT: Negative.    Eyes: Negative.    Respiratory: Negative.    Cardiovascular: Negative.    Gastrointestinal: Negative.    Endocrine: Negative.    Genitourinary: Negative.    Neurological: Positive for weakness.   Hematological: Negative.    Psychiatric/Behavioral: Positive for confusion and hallucinations. Negative for agitation, behavioral problems and sleep disturbance.     Objective:     Vital Signs (Most Recent):  Temp: 97.9 °F (36.6 °C) (08/02/17 0400)  Pulse: (!) 120 (08/02/17 0400)  Resp: (!) 23 (08/02/17 0400)  BP: (!) 150/73 (08/02/17 0400)  SpO2: 96 % (08/02/17 0400) Vital Signs (24h Range):  Temp:  [97.1 °F (36.2 °C)-98 °F (36.7 °C)] 97.9 °F (36.6 °C)  Pulse:  [] 120  Resp:  [16-23] 23  SpO2:  [94 %-96 %] 96 %  BP: (106-150)/(59-73) 150/73     Weight: 70.3 kg (154 lb 15.7 oz)  Body mass index is 25.79 kg/m².    Intake/Output Summary (Last 24 hours) at 08/02/17 0733  Last data filed at 08/02/17 0600   Gross per 24 hour   Intake              720 ml   Output                0 ml   Net              720 ml      Physical Exam   Constitutional: She appears well-developed.   Thin and frail.   HENT:   Head: Normocephalic.   +temporal wasting, hard of hearing.   Eyes:  EOM are normal. Pupils are equal, round, and reactive to light.   Neck: Normal range of motion. Neck supple.   Cardiovascular: Normal rate and regular rhythm.    Pulmonary/Chest: Effort normal. No respiratory distress. She has rales.   Abdominal: Soft. She exhibits no distension.   Musculoskeletal: Normal range of motion. She exhibits no edema.   Neurological: She is alert. No cranial nerve deficit. She exhibits normal muscle tone.   Disoriented to time, but oriented to place and person. Intermittent Visual hallucinations.    Skin: Skin is warm and dry.   Psychiatric: Her affect is not labile. Her speech is not rapid and/or pressured. She is not agitated and not actively hallucinating. She exhibits abnormal recent memory and abnormal remote memory.   Nursing note and vitals reviewed.      Significant Labs: All pertinent labs within the past 24 hours have been reviewed.    Significant Imaging: I have reviewed all pertinent imaging results/findings within the past 24 hours.  I have reviewed and interpreted all pertinent imaging results/findings within the past 24 hours.    Assessment/Plan:      GI bleeding    Duo to gastric ulcer,on PPI.          Acute blood loss anemia    Duo to GI bleeding,S/P PRBC,stable at this time.          Visual hallucination    As above          Pulmonary edema    As above          Paroxysmal atrial fibrillation    Off anticoagulation. Increased metoprolol. Still slightly tachy but will treat SOB as possible culprit. Management of pulm edema as above          Oropharyngeal dysphagia    Continued pureed diet with nectar thickened liquids. Only eating ~25% of meals. Mostly eating ice cream and pudding. Cough with meals despite restrictions. This is less frequent now. Speech attempted MBBS but unsuccessful as patient declined study. Will continue current diet but family is aware that there are risks for aspiration. Providing but with one-to-one assistance.           Physical deconditioning     consulted PT/OT. Recommending SNF but this might not be ultimate goal. PPD placed.  aware          DNR (do not resuscitate)    Reviewed with patient and family, she is DNR. Palliative care consulted for counseling and [place] filled [place]. Palliative care following.           Metabolic encephalopathy    Unclear etiology at first given Na level was normal, but repeat level markedly different with hyponatremia of 117 and repeat confirmed to be true value. Workup with head CT and MRI negative, non-focal exam. Recent admit for urinary retention made infectious process a consideration but all cultures are negative. Is better, but I believe she may still be experience side effect from sedation given for EGD. Patient was not sleeping well at nights but sleeping all day.  It appears she has been encephalopathic since admission, however it was purely metabolic at first, now this is purely delirium with visual hallucinations. May be her new baseline. Attempt to have patient up in chair if she agrees. Empitic abx treatment with augmentin in case aspiration PNA and/or UTI (unable to obtain sample). Frequent reorientation. Blinds open during the day time, have family around at all times          Urinary retention    Pt was here last week for urinary retention and started on flomax. Bladder scan and straight cath if needed. Has incontinence. Renal funciton stable        Chronic diastolic congestive heart failure    With pulmonary edema likely due to hypoalbuminemia and fluids. This may be a sign of slight decompensation. Stopped fluids. Check BG frequently as she has poor PO intake. Currently WNL. Not in respiratory distress today. Hold off on lasix.         * PUD (peptic ulcer disease)    Patient had EGD on 7/22 and 7/23. 7/22- unable to visualize secondary to blood. Transfused blood. Second EGD on 7/23- esophageal and gastric ulcers with cautery and clips placed to gastric ulcer. Received further blood on 7/23-  total of 4 units since admission. Now resolved. Off all anticoagulation. Change CBC to every other day. Changed PPI to BID. Continue via IV as patient likely to decline PO dose.             VTE Risk Mitigation         Ordered     Medium Risk of VTE  Once      07/21/17 0149     Reason for No Pharmacological VTE Prophylaxis  Once      07/21/17 0149          Lisa Ramos MD  Department of Hospital Medicine   [facility]

## 2017-08-02 NOTE — PLAN OF CARE
Problem: Fall Risk (Adult)  Goal: Absence of Falls  Patient will demonstrate the desired outcomes by discharge/transition of care.   Outcome: Outcome(s) achieved Date Met: 08/02/17 08/02/17 0336   Fall Risk (Adult)   Absence of Falls making progress toward outcome   Located close to nursing station with bed alarm on.    Problem: Patient Care Overview  Goal: Plan of Care Review  Outcome: Ongoing (interventions implemented as appropriate)   08/02/17 0336   Coping/Psychosocial   Plan Of Care Reviewed With patient   Awake most of night, talking to self at intervals. Taking off cardiac monitor several times during night. Incontinent of urine, no stool.    Problem: Infection, Risk/Actual (Adult)  Goal: Infection Prevention/Resolution  Patient will demonstrate the desired outcomes by discharge/transition of care.   Outcome: Ongoing (interventions implemented as appropriate)   08/02/17 0336   Infection, Risk/Actual (Adult)   Infection Prevention/Resolution making progress toward outcome   Po abx ordered, refused last pm.    Problem: Confusion, Acute (Adult)  Goal: Cognitive/Functional Impairments Minimized  Patient will demonstrate the desired outcomes by discharge/transition of care.   Outcome: Ongoing (interventions implemented as appropriate)   08/02/17 0336   Confusion, Acute (Adult)   Cognitive/Functional Impairments Minimized making progress toward outcome   Restless, calling out, refused to take medication. Said only wants to go home right now.

## 2017-08-02 NOTE — DISCHARGE SUMMARY
"[facility] Medicine  Discharge Summary      Patient Name: Rachael Almaguer  MRN: [id number]  Admission Date: 7/20/2017  Hospital Length of Stay: 13 days  Discharge Date and Time:  08/02/2017 10:52 AM  Attending Physician: Lisa Ramos MD   Discharging Provider: Lisa Ramos MD  Primary Care Provider: Boris Carmichael MD      HPI:   Rachael Almaguer is a 89 y.o. with medical history HTN, CAD, and depression. She presented for evaluation of 5 day history of hallucinations since discharge from this hospital on Saturday. Patient was admitted for urinary retension. History taken from patients chart and patient.    Per patient, who surprisingly gives good history except for the visual hallucinations, she had been seeing people who are not there. She tells me that her 10 year old granddaughter is in the chair in the room during history taking. However, she is able to tell me that on Monday she saw her PCP who told her "you don't look good". She also saw her cardiologist (Dr. Whiting) today who told her to come to the hospital. Additionally, patient reports BLLE swelling.    Patient is found to have significant drop in H/H since 2 weeks ago was 14.1/41.0 now 10.5/30.9; dehydration with BUN/CR/GFR 57/1.8/24 compared to Yonatan her renal functions were normal.            Procedure(s) (LRB):  ESOPHAGOGASTRODUODENOSCOPY (EGD) (N/A)      Indwelling Lines/Drains at time of discharge:   Lines/Drains/Airways     Peripherally Inserted Central Catheter Line                 PICC Double Lumen 07/22/17 11 days              Hospital Course:   Ms. Almaguer was admitted with AMS of uncertain etiology in the beginning, but quickly became clear after repeat BMP showed Na was 117 causing metabolic encephalopathy. Pt had negative head CT and further workup with MRI was unremarkable. Neurology following. Pt with hypovolemia causing hyponatremia and all diuretics stopped and steady correction with NS in " progress with close monitoring of electrolytes. Patient was transferred to the ICU on 7/22 for acute GI bleed.  GI was consulted and took the patient to EGD the same day.  Blood was found in the entire stomach.  The patient received multiple units of blood. She continued to bleed and the patient was taken back to EGD on 7/23. This showed non-bleeding esophageal ulcer as well as a non-bleeding gastric ulcer with cautery and clips placed.  GI recommended continued ICU monitoring.  Patient is DNR. Patient received blood again on 7/23. She received a total of 4 units of blood. The patient was very agitated on 7/23 and Zyprexa was started. The patient's H/H stabilized since 7/23 and the patient was sent to the floor on 7/24. No further evidence of GI bleed since. Speech, PT/OT were consulted on 7/24. Has declined significantly and at times is unwilling to participate as she feels exhausted. Speech recommended pureed with nectar thickened liquids, but unfortunately patient continues to show signs of aspiration as she tends to cough after meals. An MBBS was attempted on 7/27 but patient adamantly refused. Metoprolol increased 50 mg BID for better control of AFib. Anticoagulation on hold indefinitely. Patient and family aware that AFib increases risk for stroke when not on anticoagulation. Verbalized understanding. Also third-spacing and evidently with pulmonary edema. Trial of IV lasix but will need to continue very gently hydration with D5W as PO intake is extremely limited and is at risk for hypoglycemia. Delirium and visual hallucinations continue to be an issue. Barely sleeps at night despite efforts. Psych consulted for recs on this matter. I am concerned that patient will not achieve meaningful recovery in order to transfer to a SNF. Is evidently malnourished, is third spacing, has oropharyngeal dysphagia on precautions, with signs of aspiration despite these precautions, gets exhausted very easily and has expressed  that she just wants to lay in bed. Declined MBBS and has expressed not wanting to proceed with PT/OT. Is no interested in nursing facility but rather adamant on going home. Lives alone and was fairly independent up until a month ago or so (per daughter and daughter in law). Son is only one who lives close. Is DNR and is hospice appropriate. Palliative care on board. Meeting with patient's three sons and daughter held on 7/28 along with palliative care and social work. Long discussion about patient's current clinical status and recommendation for hospice services. Overall, they all expressed agreement with hospice as best next step but only one son lives in [place] and is unable to provide 24-hour care for patient at home. Other two sons and daughter expressed difficulty meeting such demand as well. They also expressed not able to afford a private sitter.will plan with family for Hospice placement.had  family meeting today.every body agree with Inpatient Hospice.     Consults:   Consults         Status Ordering Provider     Inpatient consult to Cardiology  Once     Provider:  John Whiting MD    Completed BASILIO LENZ     Inpatient consult to Gastroenterology  Once     Provider:  Rafael Garcia MD    Completed BASILIO LENZ.     Inpatient consult to General Surgery  Once     Provider:  Boris Cole MD    Completed DREW MARROQUIN     Inpatient consult to Neurology  Once     Provider:  Umair Gomez MD    Completed TIFFANI LOREDO     Inpatient consult to Palliative Care  Once     Provider:  Viridiana Cherry RN    Completed BASILIO LENZ.     Inpatient consult to PICC team ([facility])  Once     Provider:  (Not yet assigned)    Acknowledged BASILIO LENZ     Inpatient consult to Psychiatry  Once     Provider:  Malik Mcgee MD    Completed NORMAN SÁNCHEZ     Inpatient consult to Social Work  Once     Provider:  (Not yet assigned)    Acknowledged NORMAN SÁNCHEZ     Inpatient consult to Social  Work  Once     Provider:  (Not yet assigned)    Completed NORMAN SÁNCHEZ          Significant Diagnostic Studies: Labs:   BMP:   Recent Labs  Lab 08/01/17  0444         K 5.0      CO2 29   BUN 31*   CREATININE 0.8   CALCIUM 8.4*   MG 1.7    and CBC   Recent Labs  Lab 08/01/17  0444   WBC 13.55*   HGB 11.2*   HCT 34.1*        Radiology: X-Ray: CXR: X-Ray Chest 1 View (CXR):   Results for orders placed or performed during the hospital encounter of 07/20/17   X-Ray Chest 1 View    Narrative    There is a right PICC with the tip in the superior vena cava. There is an indistinct appearance of the pulmonary vasculature. The cardiac silhouette is enlarged. There is atherosclerotic disease of aorta. There are small pleural effusions.    Impression     There are findings concerning for pulmonary edema. This is progressed slightly from prior exam.      Electronically signed by: ANGELA PIERRE MD  Date:     07/27/17  Time:    10:37        Pending Diagnostic Studies:     None        Final Active Diagnoses:    Diagnosis Date Noted POA    PRINCIPAL PROBLEM:  PUD (peptic ulcer disease) [K27.9] 07/23/2017 Yes    Acute blood loss anemia [D62] 08/01/2017 Yes    GI bleeding [K92.2] 08/01/2017 Yes    Severe malnutrition [E43] 07/31/2017 Yes    Pulmonary edema [J81.1] 07/28/2017 Clinically Undetermined    Visual hallucination [R44.1] 07/28/2017 Yes    Oropharyngeal dysphagia [R13.12] 07/25/2017 Yes    Paroxysmal atrial fibrillation [I48.0] 07/25/2017 Yes    Physical deconditioning [R53.81] 07/24/2017 Yes    DNR (do not resuscitate) [Z66] 07/21/2017 Yes    Metabolic encephalopathy [G93.41] 07/20/2017 Yes    Urinary retention [R33.9] 07/13/2017 Yes    Chronic diastolic congestive heart failure [I50.32] 01/02/2017 Yes     Chronic      Problems Resolved During this Admission:    Diagnosis Date Noted Date Resolved POA    Visual hallucinations [R44.1]  07/26/2017 Yes    GIB (gastrointestinal  bleeding) [K92.2] 07/22/2017 07/24/2017 Yes    Acute blood loss anemia [D62] 07/20/2017 07/24/2017 Yes    Acute renal failure superimposed on stage 3 chronic kidney disease [N17.9, N18.3] 07/20/2017 07/24/2017 Yes    Hyponatremia [E87.1] 12/31/2016 07/24/2017 Yes      No new Assessment & Plan notes have been filed under this hospital service since the last note was generated.  Service: Hospital Medicine      Discharged Condition: poor    Disposition: Hospice/Medical Facility    Follow Up:  Follow-up Information     Boris Carmichael MD In 1 week.    Specialty:  Internal Medicine  Contact information:  [facility]  [address]  [facility] [phone]                 Patient Instructions:     Diet general     Activity as tolerated       Medications:  Reconciled Home Medications:   Current Discharge Medication List      CONTINUE these medications which have NOT CHANGED    Details   busPIRone (BUSPAR) 10 MG tablet Take 10 mg by mouth 2 (two) times daily.       furosemide (LASIX) 20 MG tablet Take 1 tablet (20 mg total) by mouth 2 (two) times daily.  Qty: 60 tablet, Refills: 0      lisinopril 10 MG tablet Take 1 tablet (10 mg total) by mouth once daily.  Qty: 30 tablet, Refills: 0      metoprolol tartrate (LOPRESSOR) 50 MG tablet Take 50 mg by mouth 2 (two) times daily.      mirtazapine (REMERON) 15 MG tablet Take 15 mg by mouth every evening.      MV, MIN #36/IRON,CARBONYL/FA (GERITOL COMPLETE ORAL) Take by mouth once daily.       MV-MIN/FA/VIT K/LYCOP/LUT/ZEAX (OCUVITE EYE + MULTI ORAL) Take by mouth.      acetaminophen (TYLENOL) 325 MG tablet Take 325 mg by mouth Daily.         STOP taking these medications       alendronate (FOSAMAX) 10 MG Tab Comments:   Reason for Stopping:         dabigatran etexilate (PRADAXA) 75 mg Cap Comments:   Reason for Stopping:             Time spent on the discharge of patient: 30  minutes    Lisa Ramos MD  Department of Hospital Medicine  [facility]

## 2017-08-02 NOTE — NURSING
Report called to Desiree, charge nurse at [facility]. Verbalizes understanding. PICC line removed as ordered. Patient transported to [facility] via stretcher by ambulance. No acute distress noted.

## 2017-08-03 NOTE — PT/OT/SLP DISCHARGE
Physical Therapy Discharge Summary    Rachael Almaguer  MRN: [id number]   PUD (peptic ulcer disease)   Patient Discharged from acute Physical Therapy on 17.  Please refer to prior PT noted date on 17 for functional status.     Assessment:   Goals partially met. Patient appropriate for care in another setting.  GOALS:    Physical Therapy Goals        Problem: Physical Therapy Goal    Goal Priority Disciplines Outcome Goal Variances Interventions   Physical Therapy Goal     PT/OT, PT Ongoing (interventions implemented as appropriate)     Description:  Goals to be met by: 17    Patient will increase functional independence with mobility by performin. Supine to sit with Moderate Assistance  2. Sit to stand transfer with Moderate Assistance  3. Gait x50ft with RW and moderate assistance  4. Lower extremity exercise program x30 reps per handout, with assistance as needed                     Reasons for Discontinuation of Therapy Services  Transfer to alternate level of care.      Plan:  Patient Discharged to: Palliative Care/Hospice.

## 2017-11-17 NOTE — CONSULTS
Brief Cardiology Consult note    History:      69 y/o WF admitted with possible urinary retention. Denies CP/Dyspnea/edema. Elevated troponin but not consistent with MI. EKG abnormal but stable    Echocardiogram 7/2017  CONCLUSIONS     1 - Biatrial enlargement.     2 - Concentric hypertrophy.     3 - Normal left ventricular systolic function (EF 60-65%).     4 - Diastolic dysfunction is present.     5 - There is severe pulmonary hypertension.     6 - Moderate mitral regurgitation.     7 - Moderate tricuspid regurgitation.     8 - The mitral valve is mildly sclerotic with evidence of prolapse.     9 - Moderate aortic regurgitation.     10 - There is localized calcification of the posterior leaflet of the MV..     Problem List  Chronic A-fib on Pradaxa  Elevated troponin  Chronic Diastolic Heart Failure  Hyponatremia  HTN    Plan:       Medications will be continued and adjusted as necessary. There is no acute MI. Continue rate control and oral A/C      Full dictation to follow from office.    
good balance

## 2018-03-15 NOTE — PT/OT/SLP EVAL
Speech Language Pathology  Dysphagia Evaluation    Rachael Almaguer   MRN: [id number]   Admitting Diagnosis: PUD (peptic ulcer disease)    Diet recommendations: Solid Diet Level: Puree  Liquid Diet Level: Nectar Thick Feed only when awake/alert, No straws, HOB to 90 degrees, Small bites/sips, Alternating bites/sips, 1 bite/sip at a time, Check for pocketing/oral residue, Remain upright 30 minutes post meal, Meds crushed in puree and Assistance with meals    SLP Treatment Date: 07/25/17  Speech Start Time: 0915     Speech Stop Time: 0945     Speech Total (min): 30 min       TREATMENT BILLABLE MINUTES:  Eval Swallow and Oral Function 30    Diagnosis: PUD (peptic ulcer disease)      Past Medical History:   Diagnosis Date    Coronary artery disease     Depression     [place]    Hepatitis C     Hypertension      History reviewed. No pertinent surgical history.    Has the patient been evaluated by SLP for swallowing? : Yes  Keep patient NPO?: No   General Precautions: Standard, aspiration, hearing impaired, pureed diet, nectar thick    Current Respiratory Status: nasal cannula     Social Hx: Patient lives with alone    Prior diet: regular    Occupational/hobbies/homemaking:    Subjective:  Patient seen in room, family in attendance, patient asleep easily aroused to tactile stimuli as patient is very [place].  Repositioned patient for optimal safe po intake.  Patient required oral hygiene as tongue and surrounding mucosa was very dry and caked with some dried blood.  Patient tolerated well.  Some bruising noted on right anterior of tongue.  Patient presented with applesauce by spoon, required assistance with removing bolus form spoon, mild delay with a/p transport and noted some discomfort at swallowing, possibly throat was dry.  Noted very audible and visible swallow.  patient revealed strong clear voice after swallow and no outward s/s of aspiration.  Patient presented with additional spoon of applesauce and able to  Threatened Miscarriage: Care Instructions  Your Care Instructions    Some women have light spotting or bleeding during the first 12 weeks of pregnancy. In some cases this is normal. Light spotting or bleeding can also be a sign of a possible loss of the pregnancy. This is called a threatened miscarriage. At this point, the doctor may not be able to tell if your vaginal bleeding is normal or is a sign of a miscarriage. In early pregnancy, things such as stress, exercise, and sex do not cause miscarriage. You may be worried or upset about the possibility of losing your pregnancy. But do not blame yourself. There is no treatment to stop a threatened miscarriage. If you do have a miscarriage, there was nothing you could have done to prevent it. A miscarriage usually means that the pregnancy is not developing normally. The doctor has checked you carefully, but problems can develop later. If you notice any problems or new symptoms, get medical treatment right away. Follow-up care is a key part of your treatment and safety. Be sure to make and go to all appointments, and call your doctor if you are having problems. It's also a good idea to know your test results and keep a list of the medicines you take. How can you care for yourself at home? · If you do have a miscarriage, you will probably have some vaginal bleeding for 1 to 2 weeks. Use pads instead of tampons. · Take acetaminophen (Tylenol) for cramps. Read and follow all instructions on the label. · Do not take two or more pain medicines at the same time unless the doctor told you to. Many pain medicines have acetaminophen, which is Tylenol. Too much acetaminophen (Tylenol) can be harmful. · Do not have sex until your doctor says it is okay. · Get lots of rest over the next several days. · You may do your normal activities if you feel well enough to do them. But do not do any heavy exercise until your doctor says it is okay.   · Eat a balanced diet that is high in iron and vitamin C. Foods rich in iron include red meat, shellfish, eggs, beans, and leafy green vegetables. Foods high in vitamin C include citrus fruits, tomatoes, and broccoli. Talk to your doctor about whether you need to take iron pills or a multivitamin. · Do not drink alcohol or use tobacco or illegal drugs. · Do not smoke. If you need help quitting, talk to your doctor about stop-smoking programs and medicines. These can increase your chances of quitting for good. When should you call for help? Call 911 anytime you think you may need emergency care. For example, call if:  ? · You passed out (lost consciousness). ?Call your doctor now or seek immediate medical care if:  ? · You have severe vaginal bleeding. ? · You are dizzy or lightheaded, or you feel like you may faint. ? · You have new or worse pain in your belly or pelvis. ? · You have a fever. ? · You have vaginal discharge that smells bad. ? Watch closely for changes in your health, and be sure to contact your doctor if:  ? · You do not get better as expected. Where can you learn more? Go to http://dakota-tal.info/. Enter K799 in the search box to learn more about \"Threatened Miscarriage: Care Instructions. \"  Current as of: March 16, 2017  Content Version: 11.4  © 2128-3144 [employer]. Care instructions adapted under license by [employer] (which disclaims liability or warranty for this information). If you have questions about a medical condition or this instruction, always ask your healthcare professional. [place] [address] any warranty or liability for your use of this information. Musculoskeletal Chest Pain: Care Instructions  Your Care Instructions    Chest pain is not always a sign that something is wrong with your heart or that you have another serious problem.  The doctor thinks your chest pain is caused by strained muscles or ligaments, inflamed retrieve bolus from spoon with increased a/p transport.  Patient then presented with thin liquid by cup, required to pour bolus into anterior of oral cavity, able to seal and noted audible and visible swallow.  Patient did reveal mild gurgle after multiple swallows and required several repetitions to clear her throat secondary to [place].  Patient tolerated multiple  Spoonfuls of pudding and able to talk with cln while eating,clear voice after each swallow.  Required more oral hygiene after po intake, noted more dry flakes from mucosa easily removed.  Rubbed vaseline on lips after.  Recommend puree with nectar thick liquids with assitanc e with all meals, cup only, small bites, alternate solids and liquids  ST to follow  Patient goals: to go home         Objective:   Patient found with: oxygen, peripheral IV, telemetry    Oral Musculature Evaluation  Dentition: edentulous  Mucosal Quality: dry  Mandibular Strength and Mobility: flaccid  Oral Labial Strength and Mobility: impaired seal, impaired pursing  Lingual Strength and Mobility: impaired strength  Velar Elevation: WNL  Buccal Strength and Mobility: flaccid  Volitional Cough:  (fair)  Voice Prior to PO Intake:  (wnl low intensity)     Bedside Swallow Eval:  Consistencies Assessed: Thin liquids by cup , mild gurgle  Oral Phase: decreased closure around utensil and dry mouth  Pharyngeal Phase: multiple spontaneous swallows and throat clearing    Additional Treatment:      FIM:                                 Assessment:  Rachael Almaguer is a 89 y.o. female with a medical diagnosis of PUD (peptic ulcer disease) and presents with mild oral pharyngeal dysphagia.          Discharge recommendations:       Goals:    SLP Goals        Problem: SLP Goal    Goal Priority Disciplines Outcome   SLP Goal     SLP Ongoing (interventions implemented as appropriate)   Description:  Short term goals:  1) patient will tolerate puree with nectar thick liquids without evidence of  aspiration, weight loss or dehydration  2) patient will utilize compensatory strategies with max assistance for safe effective po intake                      Plan:   Patient to be seen Therapy Frequency: 3 x/week   Plan of Care expires: 07/29/17  Plan of Care reviewed with: patient, family  SLP Follow-up?: Yes  SLP - Next Visit Date: 07/26/17           Ching Ndiaye CCC-SLP  07/25/2017             chest cartilage, or another problem in your chest, rather than by your heart. You may need more tests to find the cause of your chest pain. Follow-up care is a key part of your treatment and safety. Be sure to make and go to all appointments, and call your doctor if you are having problems. It's also a good idea to know your test results and keep a list of the medicines you take. How can you care for yourself at home? · Take pain medicines exactly as directed. ¨ If the doctor gave you a prescription medicine for pain, take it as prescribed. ¨ If you are not taking a prescription pain medicine, ask your doctor if you can take an over-the-counter medicine. · Rest and protect the sore area. · Stop, change, or take a break from any activity that may be causing your pain or soreness. · Put ice or a cold pack on the sore area for 10 to 20 minutes at a time. Try to do this every 1 to 2 hours for the next 3 days (when you are awake) or until the swelling goes down. Put a thin cloth between the ice and your skin. · After 2 or 3 days, apply a heating pad set on low or a warm cloth to the area that hurts. Some doctors suggest that you go back and forth between hot and cold. · Do not wrap or tape your ribs for support. This may cause you to take smaller breaths, which could increase your risk of lung problems. · Mentholated creams such as Bengay or Icy Hot may soothe sore muscles. Follow the instructions on the package. · Follow your doctor's instructions for exercising. · Gentle stretching and massage may help you get better faster. Stretch slowly to the point just before pain begins, and hold the stretch for at least 15 to 30 seconds. Do this 3 or 4 times a day. Stretch just after you have applied heat. · As your pain gets better, slowly return to your normal activities. Any increased pain may be a sign that you need to rest a while longer. When should you call for help?   Call 911 anytime you think you may need emergency care. For example, call if:  ? · You have chest pain or pressure. This may occur with:  ¨ Sweating. ¨ Shortness of breath. ¨ Nausea or vomiting. ¨ Pain that spreads from the chest to the neck, jaw, or one or both shoulders or arms. ¨ Dizziness or lightheadedness. ¨ A fast or uneven pulse. After calling 911, chew 1 adult-strength aspirin. Wait for an ambulance. Do not try to drive yourself. ? · You have sudden chest pain and shortness of breath, or you cough up blood. ?Call your doctor now or seek immediate medical care if:  ? · You have any trouble breathing. ? · Your chest pain gets worse. ? · Your chest pain occurs consistently with exercise and is relieved by rest.   ? Watch closely for changes in your health, and be sure to contact your doctor if:  ? · Your chest pain does not get better after 1 week. Where can you learn more? Go to http://dakota-tal.info/. Enter V293 in the search box to learn more about \"Musculoskeletal Chest Pain: Care Instructions. \"  Current as of: March 20, 2017  Content Version: 11.4  © 3854-7904 [employer]. Care instructions adapted under license by [employer] (which disclaims liability or warranty for this information). If you have questions about a medical condition or this instruction, always ask your healthcare professional. [address] any warranty or liability for your use of this information.

## 2022-05-23 NOTE — CONSULTS
"[facility] [place]  General Surgery  Consult Note    Consults  Subjective:     Chief Complaint/Reason for Admission: UGIB    History of Present Illness: Ms Almaguer is a 89F with history of AF on pradaxa admitted with encephalopathy and hyponatremia, found to have UGIB during this admission. She was taken to endoscopy by GI for EGD 7/22, which was unsuccessful due to large amount of old blood. EGD was repeated this am, and a hiatal hernia with an 0.8cm ulceration with pigmented vessel was identified, injected with epi and clipped. She is currently receiving her 3rd uPRBC, and continued to pass dark, old bloody BM.  Per family members bedside, patient with remote history of an ulcer. They are unsure of previous upper or lower endoscopy that the patient may have had, and denies recent history of GIB outside of this current episode.     No current facility-administered medications on file prior to encounter.      Current Outpatient Prescriptions on File Prior to Encounter   Medication Sig    alendronate (FOSAMAX) 10 MG Tab Take 5 mg by mouth once daily.    busPIRone (BUSPAR) 10 MG tablet Take 10 mg by mouth 2 (two) times daily.     dabigatran etexilate (PRADAXA) 75 mg Cap Take 75 mg by mouth 2 (two) times daily. "Do NOT break, chew, or open capsules."    furosemide (LASIX) 20 MG tablet Take 1 tablet (20 mg total) by mouth 2 (two) times daily.    lisinopril 10 MG tablet Take 1 tablet (10 mg total) by mouth once daily.    metoprolol tartrate (LOPRESSOR) 50 MG tablet Take 50 mg by mouth 2 (two) times daily.    mirtazapine (REMERON) 15 MG tablet Take 15 mg by mouth every evening.    MV, MIN #36/IRON,CARBONYL/FA (GERITOL COMPLETE ORAL) Take by mouth once daily.     acetaminophen (TYLENOL) 325 MG tablet Take 325 mg by mouth Daily.       Review of patient's allergies indicates:   Allergen Reactions    Aspirin     Xanax [alprazolam]        Past Medical History:   Diagnosis Date    Coronary artery disease  "    Depression     ON WELLBUTRIN    Hepatitis C     Hypertension      History reviewed. No pertinent surgical history.  Family History     None        Social History Main Topics    Smoking status: Former Smoker    Smokeless tobacco: Never Used    Alcohol use No    Drug use: No    Sexual activity: Not Currently     Review of Systems  Objective:     Vital Signs (Most Recent):  Temp: 98.1 °F (36.7 °C) (07/23/17 1011)  Pulse: (!) 115 (07/23/17 1011)  Resp: 20 (07/23/17 1011)  BP: (!) 113/57 (07/23/17 1011)  SpO2: 98 % (07/23/17 1011) Vital Signs (24h Range):  Temp:  [98 °F (36.7 °C)-99.1 °F (37.3 °C)] 98.1 °F (36.7 °C)  Pulse:  [] 115  Resp:  [10-33] 20  SpO2:  [68 %-100 %] 98 %  BP: ()/(39-79) 113/57     Weight: 66 kg (145 lb 8.1 oz)  Body mass index is 24.21 kg/m².      Intake/Output Summary (Last 24 hours) at 07/23/17 1023  Last data filed at 07/23/17 0912   Gross per 24 hour   Intake          1605.25 ml   Output              150 ml   Net          1455.25 ml       Physical Exam  Awake, altered mental status  EOMI  NCAT  Moist mucous membranes  Tachycardia, AF  No increased work of breathing  Abdomen soft, non distended, mildly tender  Rectal: poor tone, no masses. +dark old blood and mucous  No MSK deformity  +senile bruising    Significant Labs:  BMP:   Recent Labs  Lab 07/22/17  1814 07/23/17  0802   GLU 80 100   * 134*   K 5.2* 4.7    104   CO2 24 26   BUN 62* 56*   CREATININE 0.9 0.8   CALCIUM 7.4* 7.5*   MG 1.5*  --      CBC:   Recent Labs  Lab 07/23/17  0803   WBC 14.25*   RBC 2.67*   HGB 8.6*   HCT 24.9*   *   MCV 93   MCH 32.2*   MCHC 34.5       Significant Diagnostics:  None    Assessment/Plan:   89F with AF on Pradaxa with UGIB 2/2 gastric ulcer s/p EGD clip and epi  Continue to monitor H/h, signs of active bleeding  Hold praxada/anticoags  Continue protonix    Active Diagnoses:    Diagnosis Date Noted POA    PRINCIPAL PROBLEM:  Metabolic encephalopathy [G93.41]  07/20/2017 Yes    GIB (gastrointestinal bleeding) [K92.2] 07/22/2017 Yes    DNR (do not resuscitate) [Z66] 07/21/2017 Yes    Acute blood loss anemia [D62] 07/20/2017 Yes    Acute renal failure superimposed on stage 3 chronic kidney disease [N17.9, N18.3] 07/20/2017 Yes    Urinary retention [R33.9] 07/13/2017 Yes    Chronic diastolic congestive heart failure [I50.32] 01/02/2017 Yes     Chronic    Hyponatremia [E87.1] 12/31/2016 Yes      Problems Resolved During this Admission:    Diagnosis Date Noted Date Resolved POA       Thank you for your consult. I will follow-up with patient. Please contact us if you have any additional questions.    Ning Garcia MD  General Surgery  [facility]   Performed Resulted

## 2024-02-27 NOTE — ASSESSMENT & PLAN NOTE
Dr Mayer informed me during clinic that the plan is for patient to be seen Thursday for intervention. Let me know if there's anything else we need to do to facilitate this.    Thank you,    Lilia Martínez MD, MPH  Family Physician with Obstetrics  [facility]    Duo to gastric ulcer,on PPI.